# Patient Record
Sex: FEMALE | Race: WHITE | NOT HISPANIC OR LATINO | URBAN - METROPOLITAN AREA
[De-identification: names, ages, dates, MRNs, and addresses within clinical notes are randomized per-mention and may not be internally consistent; named-entity substitution may affect disease eponyms.]

---

## 2017-01-20 ENCOUNTER — INPATIENT (INPATIENT)
Facility: HOSPITAL | Age: 57
LOS: 10 days | Discharge: HOME | End: 2017-01-31
Attending: PSYCHIATRY & NEUROLOGY | Admitting: PSYCHIATRY & NEUROLOGY
Payer: COMMERCIAL

## 2017-01-20 VITALS
WEIGHT: 145.06 LBS | HEART RATE: 71 BPM | SYSTOLIC BLOOD PRESSURE: 148 MMHG | HEIGHT: 65 IN | RESPIRATION RATE: 18 BRPM | TEMPERATURE: 98 F | OXYGEN SATURATION: 96 % | DIASTOLIC BLOOD PRESSURE: 92 MMHG

## 2017-01-20 DIAGNOSIS — F60.4 HISTRIONIC PERSONALITY DISORDER: ICD-10-CM

## 2017-01-20 DIAGNOSIS — F31.30 BIPOLAR DISORDER, CURRENT EPISODE DEPRESSED, MILD OR MODERATE SEVERITY, UNSPECIFIED: ICD-10-CM

## 2017-01-20 DIAGNOSIS — F10.10 ALCOHOL ABUSE, UNCOMPLICATED: ICD-10-CM

## 2017-01-20 DIAGNOSIS — F13.10 SEDATIVE, HYPNOTIC OR ANXIOLYTIC ABUSE, UNCOMPLICATED: ICD-10-CM

## 2017-01-20 LAB
ALBUMIN SERPL ELPH-MCNC: 3.6 G/DL — SIGNIFICANT CHANGE UP (ref 3.3–5)
ALP SERPL-CCNC: 107 U/L — SIGNIFICANT CHANGE UP (ref 40–120)
ALT FLD-CCNC: 36 U/L — SIGNIFICANT CHANGE UP (ref 12–78)
AMPHET UR-MCNC: NEGATIVE — SIGNIFICANT CHANGE UP
ANION GAP SERPL CALC-SCNC: 8 MMOL/L — SIGNIFICANT CHANGE UP (ref 5–17)
APAP SERPL-MCNC: <2 UG/ML — LOW (ref 10–30)
APPEARANCE UR: CLEAR — SIGNIFICANT CHANGE UP
AST SERPL-CCNC: 31 U/L — SIGNIFICANT CHANGE UP (ref 15–37)
BARBITURATES UR SCN-MCNC: NEGATIVE — SIGNIFICANT CHANGE UP
BASOPHILS # BLD AUTO: 0.1 K/UL — SIGNIFICANT CHANGE UP (ref 0–0.2)
BASOPHILS NFR BLD AUTO: 0.9 % — SIGNIFICANT CHANGE UP (ref 0–2)
BENZODIAZ UR-MCNC: POSITIVE — SIGNIFICANT CHANGE UP
BILIRUB SERPL-MCNC: 0.1 MG/DL — LOW (ref 0.2–1.2)
BILIRUB UR-MCNC: NEGATIVE — SIGNIFICANT CHANGE UP
BUN SERPL-MCNC: 17 MG/DL — SIGNIFICANT CHANGE UP (ref 7–23)
CALCIUM SERPL-MCNC: 8.7 MG/DL — SIGNIFICANT CHANGE UP (ref 8.5–10.1)
CHLORIDE SERPL-SCNC: 109 MMOL/L — HIGH (ref 96–108)
CO2 SERPL-SCNC: 28 MMOL/L — SIGNIFICANT CHANGE UP (ref 22–31)
COCAINE METAB.OTHER UR-MCNC: NEGATIVE — SIGNIFICANT CHANGE UP
COLOR SPEC: YELLOW — SIGNIFICANT CHANGE UP
CREAT SERPL-MCNC: 0.82 MG/DL — SIGNIFICANT CHANGE UP (ref 0.5–1.3)
DIFF PNL FLD: NEGATIVE — SIGNIFICANT CHANGE UP
EOSINOPHIL # BLD AUTO: 0.3 K/UL — SIGNIFICANT CHANGE UP (ref 0–0.5)
EOSINOPHIL NFR BLD AUTO: 2.7 % — SIGNIFICANT CHANGE UP (ref 0–6)
ETHANOL SERPL-MCNC: 143 MG/DL — HIGH (ref 0–10)
GLUCOSE SERPL-MCNC: 88 MG/DL — SIGNIFICANT CHANGE UP (ref 70–99)
GLUCOSE UR QL: NEGATIVE MG/DL — SIGNIFICANT CHANGE UP
HCT VFR BLD CALC: 41.8 % — SIGNIFICANT CHANGE UP (ref 34.5–45)
HGB BLD-MCNC: 14.7 G/DL — SIGNIFICANT CHANGE UP (ref 11.5–15.5)
KETONES UR-MCNC: NEGATIVE — SIGNIFICANT CHANGE UP
LEUKOCYTE ESTERASE UR-ACNC: NEGATIVE — SIGNIFICANT CHANGE UP
LYMPHOCYTES # BLD AUTO: 4.9 K/UL — HIGH (ref 1–3.3)
LYMPHOCYTES # BLD AUTO: 50.6 % — HIGH (ref 13–44)
MCHC RBC-ENTMCNC: 34.7 PG — HIGH (ref 27–34)
MCHC RBC-ENTMCNC: 35.2 GM/DL — SIGNIFICANT CHANGE UP (ref 32–36)
MCV RBC AUTO: 98.6 FL — SIGNIFICANT CHANGE UP (ref 80–100)
METHADONE UR-MCNC: NEGATIVE — SIGNIFICANT CHANGE UP
MONOCYTES # BLD AUTO: 0.7 K/UL — SIGNIFICANT CHANGE UP (ref 0–0.9)
MONOCYTES NFR BLD AUTO: 7.5 % — SIGNIFICANT CHANGE UP (ref 2–14)
NEUTROPHILS # BLD AUTO: 3.7 K/UL — SIGNIFICANT CHANGE UP (ref 1.8–7.4)
NEUTROPHILS NFR BLD AUTO: 38.3 % — LOW (ref 43–77)
NITRITE UR-MCNC: NEGATIVE — SIGNIFICANT CHANGE UP
OPIATES UR-MCNC: NEGATIVE — SIGNIFICANT CHANGE UP
PCP SPEC-MCNC: SIGNIFICANT CHANGE UP
PCP UR-MCNC: NEGATIVE — SIGNIFICANT CHANGE UP
PH UR: 6 — SIGNIFICANT CHANGE UP (ref 4.8–8)
PLATELET # BLD AUTO: 121 K/UL — LOW (ref 150–400)
POTASSIUM SERPL-MCNC: 3.8 MMOL/L — SIGNIFICANT CHANGE UP (ref 3.5–5.3)
POTASSIUM SERPL-SCNC: 3.8 MMOL/L — SIGNIFICANT CHANGE UP (ref 3.5–5.3)
PROT SERPL-MCNC: 7.3 GM/DL — SIGNIFICANT CHANGE UP (ref 6–8.3)
PROT UR-MCNC: NEGATIVE MG/DL — SIGNIFICANT CHANGE UP
RBC # BLD: 4.24 M/UL — SIGNIFICANT CHANGE UP (ref 3.8–5.2)
RBC # FLD: 12.5 % — SIGNIFICANT CHANGE UP (ref 11–15)
SALICYLATES SERPL-MCNC: 3.2 MG/DL — SIGNIFICANT CHANGE UP (ref 2.8–20)
SODIUM SERPL-SCNC: 145 MMOL/L — SIGNIFICANT CHANGE UP (ref 135–145)
SP GR SPEC: 1 — LOW (ref 1.01–1.02)
THC UR QL: NEGATIVE — SIGNIFICANT CHANGE UP
UROBILINOGEN FLD QL: NEGATIVE MG/DL — SIGNIFICANT CHANGE UP
WBC # BLD: 9.6 K/UL — SIGNIFICANT CHANGE UP (ref 3.8–10.5)
WBC # FLD AUTO: 9.6 K/UL — SIGNIFICANT CHANGE UP (ref 3.8–10.5)

## 2017-01-20 PROCEDURE — 99285 EMERGENCY DEPT VISIT HI MDM: CPT

## 2017-01-20 PROCEDURE — 90792 PSYCH DIAG EVAL W/MED SRVCS: CPT

## 2017-01-20 RX ORDER — ACETAMINOPHEN 500 MG
650 TABLET ORAL EVERY 6 HOURS
Qty: 0 | Refills: 0 | Status: DISCONTINUED | OUTPATIENT
Start: 2017-01-20 | End: 2017-01-31

## 2017-01-20 RX ORDER — THIAMINE MONONITRATE (VIT B1) 100 MG
100 TABLET ORAL DAILY
Qty: 0 | Refills: 0 | Status: COMPLETED | OUTPATIENT
Start: 2017-01-20 | End: 2017-01-23

## 2017-01-20 RX ORDER — IBUPROFEN 200 MG
600 TABLET ORAL EVERY 6 HOURS
Qty: 0 | Refills: 0 | Status: DISCONTINUED | OUTPATIENT
Start: 2017-01-20 | End: 2017-01-31

## 2017-01-20 RX ORDER — FLUOXETINE HCL 10 MG
10 CAPSULE ORAL DAILY
Qty: 0 | Refills: 0 | Status: DISCONTINUED | OUTPATIENT
Start: 2017-01-20 | End: 2017-01-31

## 2017-01-20 RX ORDER — NICOTINE POLACRILEX 2 MG
2 GUM BUCCAL
Qty: 0 | Refills: 0 | Status: DISCONTINUED | OUTPATIENT
Start: 2017-01-20 | End: 2017-01-31

## 2017-01-20 RX ORDER — HYDROXYZINE HCL 10 MG
50 TABLET ORAL EVERY 6 HOURS
Qty: 0 | Refills: 0 | Status: DISCONTINUED | OUTPATIENT
Start: 2017-01-20 | End: 2017-01-31

## 2017-01-20 RX ORDER — DIPHENHYDRAMINE HCL 50 MG
50 CAPSULE ORAL EVERY 6 HOURS
Qty: 0 | Refills: 0 | Status: DISCONTINUED | OUTPATIENT
Start: 2017-01-20 | End: 2017-01-31

## 2017-01-20 RX ORDER — TRAZODONE HCL 50 MG
100 TABLET ORAL AT BEDTIME
Qty: 0 | Refills: 0 | Status: DISCONTINUED | OUTPATIENT
Start: 2017-01-20 | End: 2017-01-31

## 2017-01-20 RX ORDER — NICOTINE POLACRILEX 2 MG
2 GUM BUCCAL
Qty: 0 | Refills: 0 | Status: DISCONTINUED | OUTPATIENT
Start: 2017-01-20 | End: 2017-01-20

## 2017-01-20 RX ORDER — BENZOCAINE AND MENTHOL 5; 1 G/100ML; G/100ML
1 LIQUID ORAL EVERY 4 HOURS
Qty: 0 | Refills: 0 | Status: DISCONTINUED | OUTPATIENT
Start: 2017-01-20 | End: 2017-01-31

## 2017-01-20 RX ORDER — MAGNESIUM HYDROXIDE 400 MG/1
30 TABLET, CHEWABLE ORAL DAILY
Qty: 0 | Refills: 0 | Status: DISCONTINUED | OUTPATIENT
Start: 2017-01-20 | End: 2017-01-31

## 2017-01-20 NOTE — ED BEHAVIORAL HEALTH ASSESSMENT NOTE - SUMMARY
Patient is a 55 yo female with long history of alcohol abuse (history of withdrawals but no seizures), a strong Axis II pathology (Histrionic features), benzodiazepine misuse (Xanax, Valium), with reported history of Bipolar Disorder (rule out Substance Induced Mood Disorder), several inpatient hospitalizations, 1 prior suicide attempt via OD in 2012, no hx of violence, no hx of arrests, hx of belligerent, confrontational behavior with others who self-presents today with insomnia, odd preoccupations of a "world changing" invention she will get rich on, with active alcohol use, unstable living situation, looking physically much worst then last seen, who reports suicidality, inability to engage in safety planning and does not have a safe discharge at this time given high risk factors. Character pathology, active substance use, benzo seeking behavior is again an issue, Patient looks off baseline and thought content raises concern for acute issues which may be related to substances (regardless which not going to change disposition at this time). She signed in voluntarily.
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
## 2017-01-20 NOTE — ED PROVIDER NOTE - CARE PLAN
Principal Discharge DX:	Psychiatric disturbance Principal Discharge DX:	Moderate episode of recurrent major depressive disorder

## 2017-01-20 NOTE — ED BEHAVIORAL HEALTH ASSESSMENT NOTE - AXIS IV
Problem related to social environment/Occupational problems/Economic problems/Problems with primary support/Housing problems

## 2017-01-20 NOTE — ED BEHAVIORAL HEALTH ASSESSMENT NOTE - MEDICAL ISSUES AND PLAN (INCLUDE STANDING AND PRN MEDICATION)
con;t clonidine 0.1mg PO bid for HTN; analgesics PRN for chronic back pain; nicotine gum PRN con;t clonidine 0.1mg PO bid for HTN; pain mediation PRN for chronic back pain; nicotine gum PRN con;t clonidine 0.1mg PO bid for HTN; pain mediation PRN for chronic back pain; nicotine gum PRN; lozenges & Guaifenesin for cough

## 2017-01-20 NOTE — ED PROVIDER NOTE - MEDICAL DECISION MAKING DETAILS
patient pw psychiatric disturbance. medically clear for discharge home or psych admission. patient pw psychiatric disturbance. medically clear for discharge home or psych admission. admit for depression

## 2017-01-20 NOTE — ED BEHAVIORAL HEALTH ASSESSMENT NOTE - SUICIDE RISK FACTORS
Agitation/severe anxiety/Chronic pain or acute medical issue/History of abuse/trauma/Hopelessness/Mood episode/Unable to engage in safety planning/Substance abuse/dependence/Anhedonia
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
## 2017-01-20 NOTE — ED BEHAVIORAL HEALTH ASSESSMENT NOTE - OTHER PAST PSYCHIATRIC HISTORY (INCLUDE DETAILS REGARDING ONSET, COURSE OF ILLNESS, INPATIENT/OUTPATIENT TREATMENT)
Pt reports she is on trazodone 100mg qhs as needed for sleep and clonidine for her blood pressure. Pt reports hx of bipolar d/o w/ hx of karo but mostly depressive episodes. Pt reports she has tried everything as far as medications and does not remember what has worked. 7 prior hospitalizations. She reports her manic episode in the past as 2 days of euphoria, increased goal directed activity, and racing thoughts but was also using substances during this time.

## 2017-01-20 NOTE — ED BEHAVIORAL HEALTH ASSESSMENT NOTE - TREATMENT
pt reports in may attending rehab at [facility] to get off of cymbalta and klonopin

## 2017-01-20 NOTE — ED BEHAVIORAL HEALTH ASSESSMENT NOTE - PSYCHIATRIC ISSUES AND PLAN (INCLUDE STANDING AND PRN MEDICATION)
continue Trazodone 100mg PO qhs; Fluoxetine 10mg PO qd; + PRNs; given long hx of benzo abuse, will order Ativan standing (withdrawal seizures) continue Trazodone 100mg PO qhs; Fluoxetine 10mg PO qd; + PRNs; given long hx of benzo abuse, will order Ativan 1mg PO bid standing (withdrawal seizures)

## 2017-01-20 NOTE — ED BEHAVIORAL HEALTH ASSESSMENT NOTE - RISK ASSESSMENT
Chronic risk factors: single, ongoing substance use; estranged from family and most friend; Axis II pathology; hx of belligerence; chronic unstable domicile; hx of suicide attempt. Protective factors: age < 65 yrs; medication and treatment compliant in general; access to health services. Acute risk factors: insomnia, odd preoccupations of a "world changing" invention she will get rich on, with active alcohol use, unstable living situation, looking physically much worst then last seen, who reports suicidality, inability to engage in safety planning
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
## 2017-01-20 NOTE — ED BEHAVIORAL HEALTH ASSESSMENT NOTE - DESCRIPTION (FIRST USE, LAST USE, QUANTITY, FREQUENCY, DURATION)
reports smoking 1 dozen "organic cigarettes" long hx of alcohol abuse, drank earlier today -  today occasionally smokes marijuana remote use. Pt states she has not used cocaine in 30yrs. years of benzo misuse; last used today (UTOX "+" for benzos)

## 2017-01-20 NOTE — ED PROVIDER NOTE - PHYSICAL EXAMINATION
Gen: Alert, NAD  Head: NC, AT   Eyes: PERRL, EOMI, normal lids/conjunctiva  ENT: normal hearing, patent oropharynx without erythema/exudate, uvula midline  Neck: supple, no tenderness, Trachea midline  Pulm: Bilateral BS, normal resp effort, no wheeze/stridor/retractions  CV: RRR, no M/R/G, 2+ radial and dp pulses bl, no edema  Abd: soft, NT/ND, +BS, no hepatosplenomegaly  Mskel: extremities x4 with normal ROM and no joint effusions. no ctl spine ttp.   Skin: no rash, no bruising   Neuro: AAOx3, no sensory/motor deficits, CN 2-12 intact  Psych: tearful, tangential. no si, hi. does not appear to be hallucinating.

## 2017-01-21 PROCEDURE — 99232 SBSQ HOSP IP/OBS MODERATE 35: CPT

## 2017-01-21 PROCEDURE — 71010: CPT | Mod: 26

## 2017-01-21 RX ADMIN — MAGNESIUM HYDROXIDE 30 MILLILITER(S): 400 TABLET, CHEWABLE ORAL at 20:18

## 2017-01-21 RX ADMIN — Medication 10 MILLIGRAM(S): at 09:22

## 2017-01-21 RX ADMIN — Medication 1 MILLIGRAM(S): at 20:15

## 2017-01-21 RX ADMIN — Medication 1 MILLIGRAM(S): at 09:23

## 2017-01-21 RX ADMIN — Medication 100 MILLIGRAM(S): at 14:42

## 2017-01-21 RX ADMIN — Medication 2 MILLIGRAM(S): at 06:24

## 2017-01-21 RX ADMIN — Medication 100 MILLIGRAM(S): at 09:23

## 2017-01-21 RX ADMIN — Medication 0.1 MILLIGRAM(S): at 20:15

## 2017-01-21 RX ADMIN — Medication 0.1 MILLIGRAM(S): at 09:23

## 2017-01-21 RX ADMIN — Medication 2 MILLIGRAM(S): at 15:14

## 2017-01-21 RX ADMIN — Medication 2 MILLIGRAM(S): at 20:18

## 2017-01-21 RX ADMIN — Medication 2 MILLIGRAM(S): at 18:09

## 2017-01-21 RX ADMIN — Medication 1 TABLET(S): at 09:22

## 2017-01-21 RX ADMIN — Medication 100 MILLIGRAM(S): at 20:15

## 2017-01-21 RX ADMIN — Medication 2 MILLIGRAM(S): at 14:42
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
## 2017-01-22 RX ADMIN — Medication 100 MILLIGRAM(S): at 08:31

## 2017-01-22 RX ADMIN — Medication 1 TABLET(S): at 09:35

## 2017-01-22 RX ADMIN — Medication 600 MILLIGRAM(S): at 09:35

## 2017-01-22 RX ADMIN — Medication 100 MILLIGRAM(S): at 09:35

## 2017-01-22 RX ADMIN — Medication 2 MILLIGRAM(S): at 09:35

## 2017-01-22 RX ADMIN — Medication 100 MILLIGRAM(S): at 14:25

## 2017-01-22 RX ADMIN — Medication 100 MILLIGRAM(S): at 20:16

## 2017-01-22 RX ADMIN — Medication 100 MILLIGRAM(S): at 20:15

## 2017-01-22 RX ADMIN — Medication 2 MILLIGRAM(S): at 13:08

## 2017-01-22 RX ADMIN — Medication 50 MILLIGRAM(S): at 14:19

## 2017-01-22 RX ADMIN — MAGNESIUM HYDROXIDE 30 MILLILITER(S): 400 TABLET, CHEWABLE ORAL at 20:18

## 2017-01-22 RX ADMIN — Medication 1 MILLIGRAM(S): at 09:35

## 2017-01-22 RX ADMIN — Medication 0.1 MILLIGRAM(S): at 09:35

## 2017-01-22 RX ADMIN — Medication 1 MILLIGRAM(S): at 20:15

## 2017-01-22 RX ADMIN — Medication 0.1 MILLIGRAM(S): at 20:15

## 2017-01-22 RX ADMIN — Medication 600 MILLIGRAM(S): at 10:07

## 2017-01-22 RX ADMIN — Medication 10 MILLIGRAM(S): at 09:35

## 2017-01-23 PROCEDURE — 99233 SBSQ HOSP IP/OBS HIGH 50: CPT

## 2017-01-23 RX ADMIN — Medication 2 MILLIGRAM(S): at 09:12

## 2017-01-23 RX ADMIN — Medication 0.1 MILLIGRAM(S): at 09:08

## 2017-01-23 RX ADMIN — Medication 0.1 MILLIGRAM(S): at 20:39

## 2017-01-23 RX ADMIN — Medication 2 MILLIGRAM(S): at 14:22

## 2017-01-23 RX ADMIN — Medication 1 MILLIGRAM(S): at 20:39

## 2017-01-23 RX ADMIN — Medication 50 MILLIGRAM(S): at 20:38

## 2017-01-23 RX ADMIN — Medication 50 MILLIGRAM(S): at 14:23

## 2017-01-23 RX ADMIN — Medication 1 MILLIGRAM(S): at 09:08

## 2017-01-23 RX ADMIN — Medication 10 MILLIGRAM(S): at 09:08

## 2017-01-23 RX ADMIN — Medication 100 MILLIGRAM(S): at 09:12

## 2017-01-23 RX ADMIN — Medication 650 MILLIGRAM(S): at 15:51

## 2017-01-23 RX ADMIN — Medication 100 MILLIGRAM(S): at 15:19

## 2017-01-23 RX ADMIN — Medication 100 MILLIGRAM(S): at 09:08

## 2017-01-23 RX ADMIN — Medication 1 TABLET(S): at 09:08

## 2017-01-23 RX ADMIN — Medication 100 MILLIGRAM(S): at 20:39

## 2017-01-24 PROCEDURE — 99232 SBSQ HOSP IP/OBS MODERATE 35: CPT

## 2017-01-24 RX ADMIN — Medication 50 MILLIGRAM(S): at 21:24

## 2017-01-24 RX ADMIN — Medication 1 MILLIGRAM(S): at 08:57

## 2017-01-24 RX ADMIN — MAGNESIUM HYDROXIDE 30 MILLILITER(S): 400 TABLET, CHEWABLE ORAL at 20:39

## 2017-01-24 RX ADMIN — Medication 2 MILLIGRAM(S): at 15:53

## 2017-01-24 RX ADMIN — Medication 2 MILLIGRAM(S): at 20:39

## 2017-01-24 RX ADMIN — Medication 0.1 MILLIGRAM(S): at 20:39

## 2017-01-24 RX ADMIN — Medication 1 TABLET(S): at 08:58

## 2017-01-24 RX ADMIN — Medication 100 MILLIGRAM(S): at 20:39

## 2017-01-24 RX ADMIN — Medication 50 MILLIGRAM(S): at 13:08

## 2017-01-24 RX ADMIN — Medication 10 MILLIGRAM(S): at 08:57

## 2017-01-24 RX ADMIN — Medication 100 MILLIGRAM(S): at 21:22

## 2017-01-24 RX ADMIN — Medication 2 MILLIGRAM(S): at 09:28

## 2017-01-24 RX ADMIN — Medication 1 MILLIGRAM(S): at 20:39

## 2017-01-24 RX ADMIN — Medication 0.1 MILLIGRAM(S): at 08:57

## 2017-01-24 RX ADMIN — Medication 2 MILLIGRAM(S): at 13:08

## 2017-01-25 PROCEDURE — 99232 SBSQ HOSP IP/OBS MODERATE 35: CPT

## 2017-01-25 RX ADMIN — Medication 600 MILLIGRAM(S): at 12:44

## 2017-01-25 RX ADMIN — Medication 0.1 MILLIGRAM(S): at 09:08

## 2017-01-25 RX ADMIN — Medication 100 MILLIGRAM(S): at 20:20

## 2017-01-25 RX ADMIN — Medication 10 MILLIGRAM(S): at 09:08

## 2017-01-25 RX ADMIN — Medication 1 TABLET(S): at 09:08

## 2017-01-25 RX ADMIN — Medication 2 MILLIGRAM(S): at 11:45

## 2017-01-25 RX ADMIN — Medication 100 MILLIGRAM(S): at 20:18

## 2017-01-25 RX ADMIN — Medication 100 MILLIGRAM(S): at 09:09

## 2017-01-25 RX ADMIN — Medication 600 MILLIGRAM(S): at 11:45

## 2017-01-25 RX ADMIN — Medication 50 MILLIGRAM(S): at 11:44

## 2017-01-25 RX ADMIN — Medication 2 MILLIGRAM(S): at 18:25

## 2017-01-25 RX ADMIN — Medication 0.1 MILLIGRAM(S): at 20:19

## 2017-01-25 RX ADMIN — Medication 2 MILLIGRAM(S): at 09:10

## 2017-01-25 RX ADMIN — Medication 50 MILLIGRAM(S): at 20:18

## 2017-01-25 RX ADMIN — Medication 1 MILLIGRAM(S): at 20:19

## 2017-01-25 RX ADMIN — Medication 2 MILLIGRAM(S): at 15:51

## 2017-01-25 RX ADMIN — Medication 1 MILLIGRAM(S): at 09:08

## 2017-01-26 PROCEDURE — 99232 SBSQ HOSP IP/OBS MODERATE 35: CPT

## 2017-01-26 RX ADMIN — Medication 2 MILLIGRAM(S): at 13:09

## 2017-01-26 RX ADMIN — Medication 50 MILLIGRAM(S): at 19:20

## 2017-01-26 RX ADMIN — Medication 50 MILLIGRAM(S): at 13:09

## 2017-01-26 RX ADMIN — Medication 1 TABLET(S): at 08:58

## 2017-01-26 RX ADMIN — Medication 600 MILLIGRAM(S): at 18:25

## 2017-01-26 RX ADMIN — Medication 0.1 MILLIGRAM(S): at 20:23

## 2017-01-26 RX ADMIN — Medication 2 MILLIGRAM(S): at 08:58

## 2017-01-26 RX ADMIN — Medication 1 MILLIGRAM(S): at 20:23

## 2017-01-26 RX ADMIN — Medication 0.1 MILLIGRAM(S): at 08:58

## 2017-01-26 RX ADMIN — Medication 1 MILLIGRAM(S): at 08:58

## 2017-01-26 RX ADMIN — Medication 100 MILLIGRAM(S): at 20:23

## 2017-01-26 RX ADMIN — Medication 10 MILLIGRAM(S): at 08:58

## 2017-01-27 PROCEDURE — 99232 SBSQ HOSP IP/OBS MODERATE 35: CPT

## 2017-01-27 RX ADMIN — Medication 2 MILLIGRAM(S): at 20:26

## 2017-01-27 RX ADMIN — Medication 0.1 MILLIGRAM(S): at 20:26

## 2017-01-27 RX ADMIN — Medication 50 MILLIGRAM(S): at 08:14

## 2017-01-27 RX ADMIN — Medication 10 MILLIGRAM(S): at 08:31

## 2017-01-27 RX ADMIN — Medication 50 MILLIGRAM(S): at 14:06

## 2017-01-27 RX ADMIN — Medication 2 MILLIGRAM(S): at 08:14

## 2017-01-27 RX ADMIN — Medication 0.1 MILLIGRAM(S): at 08:31

## 2017-01-27 RX ADMIN — Medication 50 MILLIGRAM(S): at 20:25

## 2017-01-27 RX ADMIN — Medication 1 TABLET(S): at 08:31

## 2017-01-27 RX ADMIN — Medication 100 MILLIGRAM(S): at 20:26

## 2017-01-27 RX ADMIN — Medication 1 MILLIGRAM(S): at 08:31

## 2017-01-27 RX ADMIN — Medication 2 MILLIGRAM(S): at 14:06

## 2017-01-27 RX ADMIN — Medication 1 MILLIGRAM(S): at 20:26

## 2017-01-28 RX ADMIN — Medication 2 MILLIGRAM(S): at 15:05

## 2017-01-28 RX ADMIN — Medication 50 MILLIGRAM(S): at 05:06

## 2017-01-28 RX ADMIN — Medication 0.1 MILLIGRAM(S): at 20:02

## 2017-01-28 RX ADMIN — Medication 600 MILLIGRAM(S): at 10:39

## 2017-01-28 RX ADMIN — Medication 1 TABLET(S): at 09:29

## 2017-01-28 RX ADMIN — Medication 50 MILLIGRAM(S): at 19:01

## 2017-01-28 RX ADMIN — Medication 10 MILLIGRAM(S): at 09:29

## 2017-01-28 RX ADMIN — Medication 2 MILLIGRAM(S): at 19:01

## 2017-01-28 RX ADMIN — Medication 2 MILLIGRAM(S): at 13:00

## 2017-01-28 RX ADMIN — Medication 100 MILLIGRAM(S): at 20:02

## 2017-01-28 RX ADMIN — Medication 600 MILLIGRAM(S): at 09:29

## 2017-01-28 RX ADMIN — Medication 50 MILLIGRAM(S): at 13:00

## 2017-01-28 RX ADMIN — Medication 2 MILLIGRAM(S): at 09:29

## 2017-01-28 RX ADMIN — Medication 0.1 MILLIGRAM(S): at 09:29

## 2017-01-29 RX ADMIN — Medication 2 MILLIGRAM(S): at 14:45

## 2017-01-29 RX ADMIN — Medication 600 MILLIGRAM(S): at 09:40

## 2017-01-29 RX ADMIN — Medication 0.1 MILLIGRAM(S): at 20:14

## 2017-01-29 RX ADMIN — Medication 600 MILLIGRAM(S): at 15:40

## 2017-01-29 RX ADMIN — Medication 600 MILLIGRAM(S): at 16:35

## 2017-01-29 RX ADMIN — Medication 600 MILLIGRAM(S): at 21:40

## 2017-01-29 RX ADMIN — Medication 2 MILLIGRAM(S): at 17:54

## 2017-01-29 RX ADMIN — Medication 2 MILLIGRAM(S): at 12:45

## 2017-01-29 RX ADMIN — Medication 2 MILLIGRAM(S): at 09:40

## 2017-01-29 RX ADMIN — Medication 100 MILLIGRAM(S): at 20:14

## 2017-01-29 RX ADMIN — Medication 1 TABLET(S): at 09:42

## 2017-01-29 RX ADMIN — Medication 600 MILLIGRAM(S): at 22:12

## 2017-01-29 RX ADMIN — Medication 10 MILLIGRAM(S): at 09:42

## 2017-01-29 RX ADMIN — Medication 50 MILLIGRAM(S): at 12:45

## 2017-01-29 RX ADMIN — Medication 2 MILLIGRAM(S): at 05:42

## 2017-01-29 RX ADMIN — Medication 600 MILLIGRAM(S): at 10:40

## 2017-01-29 RX ADMIN — Medication 0.1 MILLIGRAM(S): at 09:42

## 2017-01-29 RX ADMIN — Medication 2 MILLIGRAM(S): at 21:04

## 2017-01-29 RX ADMIN — Medication 50 MILLIGRAM(S): at 05:43

## 2017-01-29 RX ADMIN — Medication 50 MILLIGRAM(S): at 18:50

## 2017-01-30 PROCEDURE — 99232 SBSQ HOSP IP/OBS MODERATE 35: CPT

## 2017-01-30 RX ADMIN — Medication 2 MILLIGRAM(S): at 17:45

## 2017-01-30 RX ADMIN — Medication 600 MILLIGRAM(S): at 15:10

## 2017-01-30 RX ADMIN — Medication 50 MILLIGRAM(S): at 16:14

## 2017-01-30 RX ADMIN — Medication 2 MILLIGRAM(S): at 08:28

## 2017-01-30 RX ADMIN — Medication 2 MILLIGRAM(S): at 15:10

## 2017-01-30 RX ADMIN — Medication 100 MILLIGRAM(S): at 20:15

## 2017-01-30 RX ADMIN — Medication 0.1 MILLIGRAM(S): at 20:15

## 2017-01-30 RX ADMIN — Medication 50 MILLIGRAM(S): at 22:25

## 2017-01-30 RX ADMIN — Medication 1 TABLET(S): at 08:28

## 2017-01-30 RX ADMIN — Medication 600 MILLIGRAM(S): at 14:32

## 2017-01-30 RX ADMIN — Medication 0.1 MILLIGRAM(S): at 08:28

## 2017-01-30 RX ADMIN — Medication 2 MILLIGRAM(S): at 10:53

## 2017-01-30 RX ADMIN — Medication 2 MILLIGRAM(S): at 20:15

## 2017-01-30 RX ADMIN — Medication 10 MILLIGRAM(S): at 08:28

## 2017-01-30 RX ADMIN — Medication 50 MILLIGRAM(S): at 10:04

## 2017-01-30 RX ADMIN — Medication 2 MILLIGRAM(S): at 13:08

## 2017-01-31 VITALS
RESPIRATION RATE: 16 BRPM | OXYGEN SATURATION: 100 % | TEMPERATURE: 97 F | DIASTOLIC BLOOD PRESSURE: 84 MMHG | HEART RATE: 59 BPM | SYSTOLIC BLOOD PRESSURE: 143 MMHG

## 2017-01-31 PROCEDURE — 99239 HOSP IP/OBS DSCHRG MGMT >30: CPT

## 2017-01-31 RX ORDER — FLUOXETINE HCL 10 MG
1 CAPSULE ORAL
Qty: 30 | Refills: 0 | OUTPATIENT
Start: 2017-01-31 | End: 2017-03-02

## 2017-01-31 RX ORDER — TRAZODONE HCL 50 MG
1 TABLET ORAL
Qty: 30 | Refills: 0 | OUTPATIENT
Start: 2017-01-31 | End: 2017-03-02

## 2017-01-31 RX ADMIN — Medication 1 TABLET(S): at 08:21

## 2017-01-31 RX ADMIN — Medication 0.1 MILLIGRAM(S): at 08:21

## 2017-01-31 RX ADMIN — Medication 10 MILLIGRAM(S): at 08:21

## 2017-01-31 RX ADMIN — Medication 50 MILLIGRAM(S): at 05:01

## 2017-01-31 RX ADMIN — Medication 2 MILLIGRAM(S): at 08:21

## 2017-02-05 DIAGNOSIS — Z86.59 PERSONAL HISTORY OF OTHER MENTAL AND BEHAVIORAL DISORDERS: ICD-10-CM

## 2017-02-05 DIAGNOSIS — G47.00 INSOMNIA, UNSPECIFIED: ICD-10-CM

## 2017-02-05 DIAGNOSIS — Z63.8 OTHER SPECIFIED PROBLEMS RELATED TO PRIMARY SUPPORT GROUP: ICD-10-CM

## 2017-02-05 DIAGNOSIS — I51.9 HEART DISEASE, UNSPECIFIED: ICD-10-CM

## 2017-02-05 DIAGNOSIS — F31.30 BIPOLAR DISORDER, CURRENT EPISODE DEPRESSED, MILD OR MODERATE SEVERITY, UNSPECIFIED: ICD-10-CM

## 2017-02-05 DIAGNOSIS — Z91.010 ALLERGY TO PEANUTS: ICD-10-CM

## 2017-02-05 DIAGNOSIS — F60.4 HISTRIONIC PERSONALITY DISORDER: ICD-10-CM

## 2017-02-05 DIAGNOSIS — I10 ESSENTIAL (PRIMARY) HYPERTENSION: ICD-10-CM

## 2017-02-05 DIAGNOSIS — M54.9 DORSALGIA, UNSPECIFIED: ICD-10-CM

## 2017-02-05 DIAGNOSIS — Z88.5 ALLERGY STATUS TO NARCOTIC AGENT: ICD-10-CM

## 2017-02-05 DIAGNOSIS — Z91.5 PERSONAL HISTORY OF SELF-HARM: ICD-10-CM

## 2017-02-05 DIAGNOSIS — Z56.0 UNEMPLOYMENT, UNSPECIFIED: ICD-10-CM

## 2017-02-05 DIAGNOSIS — G89.29 OTHER CHRONIC PAIN: ICD-10-CM

## 2017-02-05 DIAGNOSIS — Z88.8 ALLERGY STATUS TO OTHER DRUGS, MEDICAMENTS AND BIOLOGICAL SUBSTANCES STATUS: ICD-10-CM

## 2017-02-05 DIAGNOSIS — Z91.14 PATIENT'S OTHER NONCOMPLIANCE WITH MEDICATION REGIMEN: ICD-10-CM

## 2017-02-05 SDOH — ECONOMIC STABILITY - INCOME SECURITY: UNEMPLOYMENT, UNSPECIFIED: Z56.0

## 2017-02-05 SDOH — SOCIAL STABILITY - SOCIAL INSECURITY: OTHER SPECIFIED PROBLEMS RELATED TO PRIMARY SUPPORT GROUP: Z63.8

## 2017-04-01 ENCOUNTER — OUTPATIENT (OUTPATIENT)
Dept: OUTPATIENT SERVICES | Facility: HOSPITAL | Age: 57
LOS: 1 days | End: 2017-04-01

## 2017-04-12 DIAGNOSIS — R69 ILLNESS, UNSPECIFIED: ICD-10-CM

## 2017-07-01 ENCOUNTER — OUTPATIENT (OUTPATIENT)
Dept: OUTPATIENT SERVICES | Facility: HOSPITAL | Age: 57
LOS: 1 days | End: 2017-07-01
Payer: MEDICAID

## 2017-07-19 DIAGNOSIS — R69 ILLNESS, UNSPECIFIED: ICD-10-CM

## 2017-08-01 ENCOUNTER — OUTPATIENT (OUTPATIENT)
Dept: OUTPATIENT SERVICES | Facility: HOSPITAL | Age: 57
LOS: 1 days | End: 2017-08-01

## 2018-02-07 DIAGNOSIS — R69 ILLNESS, UNSPECIFIED: ICD-10-CM

## 2018-05-01 PROCEDURE — G9001: CPT

## 2018-05-01 PROCEDURE — G9005: CPT

## 2018-06-15 ENCOUNTER — EMERGENCY (EMERGENCY)
Facility: HOSPITAL | Age: 58
LOS: 0 days | Discharge: HOME | End: 2018-06-15
Attending: EMERGENCY MEDICINE
Payer: MEDICAID

## 2018-06-15 VITALS
DIASTOLIC BLOOD PRESSURE: 95 MMHG | HEIGHT: 65 IN | HEART RATE: 86 BPM | TEMPERATURE: 98 F | SYSTOLIC BLOOD PRESSURE: 149 MMHG | RESPIRATION RATE: 16 BRPM | WEIGHT: 169.98 LBS | OXYGEN SATURATION: 98 %

## 2018-06-15 VITALS
DIASTOLIC BLOOD PRESSURE: 90 MMHG | SYSTOLIC BLOOD PRESSURE: 161 MMHG | HEART RATE: 58 BPM | OXYGEN SATURATION: 97 % | RESPIRATION RATE: 17 BRPM | TEMPERATURE: 98 F

## 2018-06-15 DIAGNOSIS — R42 DIZZINESS AND GIDDINESS: ICD-10-CM

## 2018-06-15 DIAGNOSIS — F31.9 BIPOLAR DISORDER, UNSPECIFIED: ICD-10-CM

## 2018-06-15 DIAGNOSIS — Z91.010 ALLERGY TO PEANUTS: ICD-10-CM

## 2018-06-15 DIAGNOSIS — I15.9 SECONDARY HYPERTENSION, UNSPECIFIED: ICD-10-CM

## 2018-06-15 DIAGNOSIS — R51 HEADACHE: ICD-10-CM

## 2018-06-15 PROCEDURE — 70450 CT HEAD/BRAIN W/O DYE: CPT | Mod: 26

## 2018-06-15 PROCEDURE — 99284 EMERGENCY DEPT VISIT MOD MDM: CPT

## 2018-06-15 RX ORDER — ACETAMINOPHEN 500 MG
650 TABLET ORAL ONCE
Qty: 0 | Refills: 0 | Status: COMPLETED | OUTPATIENT
Start: 2018-06-15 | End: 2018-06-15

## 2018-06-15 RX ADMIN — Medication 650 MILLIGRAM(S): at 14:45

## 2018-06-15 NOTE — ED PROVIDER NOTE - PHYSICAL EXAMINATION
Gen: No acute distress, non toxic  HEENT: Mucous membranes moist, pink conjunctivae, EOMI. NCAT  Neck: supple, no ttp.   CV: RRR, nl s1/s2.  Resp: CTAB, normal rate and effort  GI: Abdomen soft, NT, ND. No rebound, no guarding  : No CVAT  Neuro: A&O x 3, moving all 4 extremities  MSK: No spine or joint tenderness to palpation  Skin: No rashes. intact and perfused.
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
## 2018-06-15 NOTE — ED PROVIDER NOTE - MEDICAL DECISION MAKING DETAILS
reported knot to forehead that has since resolved per pt, ct r/o trauma/bleed. pt otherwise well and normal neuro. pt states just wants ct.

## 2018-06-16 RX ORDER — FLUOXETINE HCL 10 MG
1 CAPSULE ORAL
Qty: 0 | Refills: 0 | COMMUNITY

## 2018-06-16 RX ORDER — TRAZODONE HCL 50 MG
1 TABLET ORAL
Qty: 0 | Refills: 0 | COMMUNITY

## 2018-07-01 ENCOUNTER — OUTPATIENT (OUTPATIENT)
Dept: OUTPATIENT SERVICES | Facility: HOSPITAL | Age: 58
LOS: 1 days | End: 2018-07-01
Payer: COMMERCIAL

## 2018-07-10 DIAGNOSIS — Z71.89 OTHER SPECIFIED COUNSELING: ICD-10-CM

## 2019-01-01 ENCOUNTER — OUTPATIENT (OUTPATIENT)
Dept: OUTPATIENT SERVICES | Facility: HOSPITAL | Age: 59
LOS: 1 days | End: 2019-01-01
Payer: COMMERCIAL

## 2019-03-14 DIAGNOSIS — Z71.89 OTHER SPECIFIED COUNSELING: ICD-10-CM

## 2019-07-03 ENCOUNTER — EMERGENCY (EMERGENCY)
Facility: HOSPITAL | Age: 59
LOS: 0 days | Discharge: HOME | End: 2019-07-03
Attending: EMERGENCY MEDICINE

## 2019-07-03 VITALS
WEIGHT: 145.06 LBS | DIASTOLIC BLOOD PRESSURE: 74 MMHG | HEART RATE: 65 BPM | OXYGEN SATURATION: 100 % | SYSTOLIC BLOOD PRESSURE: 136 MMHG | HEIGHT: 65 IN | TEMPERATURE: 99 F | RESPIRATION RATE: 18 BRPM

## 2019-07-03 VITALS
DIASTOLIC BLOOD PRESSURE: 57 MMHG | SYSTOLIC BLOOD PRESSURE: 108 MMHG | RESPIRATION RATE: 18 BRPM | HEART RATE: 59 BPM | OXYGEN SATURATION: 99 % | TEMPERATURE: 98 F

## 2019-07-03 DIAGNOSIS — K27.5 CHRONIC OR UNSPECIFIED PEPTIC ULCER, SITE UNSPECIFIED, WITH PERFORATION: Chronic | ICD-10-CM

## 2019-07-03 DIAGNOSIS — F43.10 POST-TRAUMATIC STRESS DISORDER, UNSPECIFIED: ICD-10-CM

## 2019-07-03 DIAGNOSIS — Z98.62 PERIPHERAL VASCULAR ANGIOPLASTY STATUS: Chronic | ICD-10-CM

## 2019-07-03 DIAGNOSIS — F31.9 BIPOLAR DISORDER, UNSPECIFIED: ICD-10-CM

## 2019-07-03 DIAGNOSIS — I99.9 UNSPECIFIED DISORDER OF CIRCULATORY SYSTEM: ICD-10-CM

## 2019-07-03 DIAGNOSIS — Z90.49 ACQUIRED ABSENCE OF OTHER SPECIFIED PARTS OF DIGESTIVE TRACT: Chronic | ICD-10-CM

## 2019-07-03 DIAGNOSIS — Z87.42 PERSONAL HISTORY OF OTHER DISEASES OF THE FEMALE GENITAL TRACT: Chronic | ICD-10-CM

## 2019-07-03 DIAGNOSIS — I15.9 SECONDARY HYPERTENSION, UNSPECIFIED: ICD-10-CM

## 2019-07-03 DIAGNOSIS — Z98.890 OTHER SPECIFIED POSTPROCEDURAL STATES: Chronic | ICD-10-CM

## 2019-07-03 DIAGNOSIS — M79.669 PAIN IN UNSPECIFIED LOWER LEG: ICD-10-CM

## 2019-07-03 DIAGNOSIS — F41.9 ANXIETY DISORDER, UNSPECIFIED: ICD-10-CM

## 2019-07-03 LAB
ALBUMIN SERPL ELPH-MCNC: 3.8 G/DL — SIGNIFICANT CHANGE UP (ref 3.3–5)
ALP SERPL-CCNC: 84 U/L — SIGNIFICANT CHANGE UP (ref 40–120)
ALT FLD-CCNC: 24 U/L — SIGNIFICANT CHANGE UP (ref 12–78)
ANION GAP SERPL CALC-SCNC: 9 MMOL/L — SIGNIFICANT CHANGE UP (ref 5–17)
APTT BLD: 31.4 SEC — SIGNIFICANT CHANGE UP (ref 27.5–36.3)
AST SERPL-CCNC: 21 U/L — SIGNIFICANT CHANGE UP (ref 15–37)
BASOPHILS # BLD AUTO: 0.04 K/UL — SIGNIFICANT CHANGE UP (ref 0–0.2)
BASOPHILS NFR BLD AUTO: 0.5 % — SIGNIFICANT CHANGE UP (ref 0–2)
BILIRUB SERPL-MCNC: 0.7 MG/DL — SIGNIFICANT CHANGE UP (ref 0.2–1.2)
BLD GP AB SCN SERPL QL: SIGNIFICANT CHANGE UP
BUN SERPL-MCNC: 10 MG/DL — SIGNIFICANT CHANGE UP (ref 7–23)
CALCIUM SERPL-MCNC: 9 MG/DL — SIGNIFICANT CHANGE UP (ref 8.5–10.1)
CHLORIDE SERPL-SCNC: 107 MMOL/L — SIGNIFICANT CHANGE UP (ref 96–108)
CO2 SERPL-SCNC: 24 MMOL/L — SIGNIFICANT CHANGE UP (ref 22–31)
CREAT SERPL-MCNC: 0.88 MG/DL — SIGNIFICANT CHANGE UP (ref 0.5–1.3)
EOSINOPHIL # BLD AUTO: 0.08 K/UL — SIGNIFICANT CHANGE UP (ref 0–0.5)
EOSINOPHIL NFR BLD AUTO: 1 % — SIGNIFICANT CHANGE UP (ref 0–6)
GLUCOSE SERPL-MCNC: 99 MG/DL — SIGNIFICANT CHANGE UP (ref 70–99)
HCT VFR BLD CALC: 41.6 % — SIGNIFICANT CHANGE UP (ref 34.5–45)
HGB BLD-MCNC: 13.8 G/DL — SIGNIFICANT CHANGE UP (ref 11.5–15.5)
IMM GRANULOCYTES NFR BLD AUTO: 0.3 % — SIGNIFICANT CHANGE UP (ref 0–1.5)
INR BLD: 1 RATIO — SIGNIFICANT CHANGE UP (ref 0.88–1.16)
LYMPHOCYTES # BLD AUTO: 2.6 K/UL — SIGNIFICANT CHANGE UP (ref 1–3.3)
LYMPHOCYTES # BLD AUTO: 32.8 % — SIGNIFICANT CHANGE UP (ref 13–44)
MAGNESIUM SERPL-MCNC: 2.1 MG/DL — SIGNIFICANT CHANGE UP (ref 1.6–2.6)
MCHC RBC-ENTMCNC: 32.6 PG — SIGNIFICANT CHANGE UP (ref 27–34)
MCHC RBC-ENTMCNC: 33.2 GM/DL — SIGNIFICANT CHANGE UP (ref 32–36)
MCV RBC AUTO: 98.3 FL — SIGNIFICANT CHANGE UP (ref 80–100)
MONOCYTES # BLD AUTO: 0.62 K/UL — SIGNIFICANT CHANGE UP (ref 0–0.9)
MONOCYTES NFR BLD AUTO: 7.8 % — SIGNIFICANT CHANGE UP (ref 2–14)
NEUTROPHILS # BLD AUTO: 4.57 K/UL — SIGNIFICANT CHANGE UP (ref 1.8–7.4)
NEUTROPHILS NFR BLD AUTO: 57.6 % — SIGNIFICANT CHANGE UP (ref 43–77)
NRBC # BLD: 0 /100 WBCS — SIGNIFICANT CHANGE UP (ref 0–0)
PLATELET # BLD AUTO: 154 K/UL — SIGNIFICANT CHANGE UP (ref 150–400)
POTASSIUM SERPL-MCNC: 4.2 MMOL/L — SIGNIFICANT CHANGE UP (ref 3.5–5.3)
POTASSIUM SERPL-SCNC: 4.2 MMOL/L — SIGNIFICANT CHANGE UP (ref 3.5–5.3)
PROT SERPL-MCNC: 7.5 GM/DL — SIGNIFICANT CHANGE UP (ref 6–8.3)
PROTHROM AB SERPL-ACNC: 11.2 SEC — SIGNIFICANT CHANGE UP (ref 10–12.9)
RBC # BLD: 4.23 M/UL — SIGNIFICANT CHANGE UP (ref 3.8–5.2)
RBC # FLD: 13.1 % — SIGNIFICANT CHANGE UP (ref 10.3–14.5)
SODIUM SERPL-SCNC: 140 MMOL/L — SIGNIFICANT CHANGE UP (ref 135–145)
WBC # BLD: 7.93 K/UL — SIGNIFICANT CHANGE UP (ref 3.8–10.5)
WBC # FLD AUTO: 7.93 K/UL — SIGNIFICANT CHANGE UP (ref 3.8–10.5)

## 2019-07-03 RX ORDER — ACETAMINOPHEN 500 MG
975 TABLET ORAL ONCE
Refills: 0 | Status: COMPLETED | OUTPATIENT
Start: 2019-07-03 | End: 2019-07-03

## 2019-07-03 RX ORDER — HYDROMORPHONE HYDROCHLORIDE 2 MG/ML
1 INJECTION INTRAMUSCULAR; INTRAVENOUS; SUBCUTANEOUS ONCE
Refills: 0 | Status: DISCONTINUED | OUTPATIENT
Start: 2019-07-03 | End: 2019-07-03

## 2019-07-03 RX ADMIN — Medication 975 MILLIGRAM(S): at 14:08

## 2019-07-03 RX ADMIN — Medication 975 MILLIGRAM(S): at 13:08

## 2019-07-03 NOTE — ED ADULT NURSE NOTE - NSIMPLEMENTINTERV_GEN_ALL_ED
Implemented All Fall Risk Interventions:  [place] to call system. Call bell, personal items and telephone within reach. Instruct patient to call for assistance. Room bathroom lighting operational. Non-slip footwear when patient is off stretcher. Physically safe environment: no spills, clutter or unnecessary equipment. Stretcher in lowest position, wheels locked, appropriate side rails in place. Provide visual cue, wrist band, yellow gown, etc. Monitor gait and stability. Monitor for mental status changes and reorient to person, place, and time. Review medications for side effects contributing to fall risk. Reinforce activity limits and safety measures with patient and family.

## 2019-07-03 NOTE — ED PROVIDER NOTE - CARE PROVIDER_API CALL
Matti Da Silva)  Surgery  [address], [address]  [place] [address]  Phone: [phone]  Fax: [phone]  Follow Up Time:

## 2019-07-03 NOTE — ED PROVIDER NOTE - CLINICAL SUMMARY MEDICAL DECISION MAKING FREE TEXT BOX
PAD with right leg pain. will perform duplex to assess present status PAD with right leg pain. will perform duplex to assess present status. patent flow. ok for dc home with vascular follow up

## 2019-07-03 NOTE — ED PROVIDER NOTE - OBJECTIVE STATEMENT
Pertinent PMH/PSH/FHx/SHx and Review of Systems contained within:  58F hx of cad sp 3 stents, last in may, and femoral artery disease pw pain in the rle for several weeks. patient notes that she has been seen for vascular disease and is meant to have surgery but no one is scheduling her. this is meant to be with [facility]. she notes pain in the right leg with walking and she lives by herself. she has no cp, sob, nausea, vomiting, fever, chills, rash, bleeding, numbness, weakness, ha, vision loss, rhinorrhea, dysuria  Fh and Sh not otherwise contributory  ROS otherwise negative

## 2019-07-03 NOTE — ED ADULT NURSE NOTE - OBJECTIVE STATEMENT
Pt c/o of B/L tigh pain radiading down to legs with associated SOB. denies any chest  pain. Patient with bilateral leg pain, patient has stents placed in may using right femoral artery, pt was diagnosed with partial artery blockage in right leg. faint pulses present, patient legs are warm

## 2019-07-03 NOTE — ED ADULT TRIAGE NOTE - CHIEF COMPLAINT QUOTE
Patient with bilateral leg pain, patient has stents placed in may using right femoral artery, pt was diagnosed with partial artery blockage in right leg, patient to have surgery for legs in the next few weeks, faint pulses present, patient legs are warm, able to move toes, capillary refill 4-5 seconds

## 2019-07-03 NOTE — ED PROVIDER NOTE - PHYSICAL EXAMINATION
Gen: Alert, NAD  Head: NC, AT   Eyes: PERRL, EOMI, normal lids/conjunctiva  ENT: normal hearing, patent oropharynx without erythema/exudate, uvula midline  Neck: supple, no tenderness, Trachea midline  Pulm: Bilateral BS, normal resp effort, no wheeze/stridor/retractions  CV: RRR, no M/R/G, 2+ radial pulses bl. right dp is 1+, left dp pulse is 2+  Abd: soft, NT/ND, +BS, no hepatosplenomegaly  Mskel: extremities x4 with normal ROM and no joint effusions. no ctl spine ttp.   Skin: no rash, no bruising   Neuro: AAOx3, no sensory/motor deficits, CN 2-12 intact

## 2019-07-03 NOTE — ED PROVIDER NOTE - NSFOLLOWUPINSTRUCTIONS_ED_ALL_ED_FT
Call the VASCULAR SURGEON for a follow up appointment.    The ARTERIES are seen to be open on today's work up. This means no surgery necessary in the immediate setting.

## 2019-07-03 NOTE — ED ADULT NURSE NOTE - PMH
Anxiety    Bipolar 1 disorder    CAD (coronary artery disease)    Heart disease    PTSD (post-traumatic stress disorder)    Secondary hypertension

## 2019-09-07 ENCOUNTER — INPATIENT (INPATIENT)
Facility: HOSPITAL | Age: 59
LOS: 2 days | Discharge: HOME | End: 2019-09-10
Attending: INTERNAL MEDICINE | Admitting: INTERNAL MEDICINE
Payer: MEDICAID

## 2019-09-07 VITALS
RESPIRATION RATE: 18 BRPM | DIASTOLIC BLOOD PRESSURE: 79 MMHG | OXYGEN SATURATION: 100 % | SYSTOLIC BLOOD PRESSURE: 116 MMHG | HEART RATE: 45 BPM | TEMPERATURE: 98 F

## 2019-09-07 DIAGNOSIS — E78.5 HYPERLIPIDEMIA, UNSPECIFIED: ICD-10-CM

## 2019-09-07 DIAGNOSIS — Z98.890 OTHER SPECIFIED POSTPROCEDURAL STATES: Chronic | ICD-10-CM

## 2019-09-07 DIAGNOSIS — K27.5 CHRONIC OR UNSPECIFIED PEPTIC ULCER, SITE UNSPECIFIED, WITH PERFORATION: Chronic | ICD-10-CM

## 2019-09-07 DIAGNOSIS — Z87.42 PERSONAL HISTORY OF OTHER DISEASES OF THE FEMALE GENITAL TRACT: Chronic | ICD-10-CM

## 2019-09-07 DIAGNOSIS — Z90.49 ACQUIRED ABSENCE OF OTHER SPECIFIED PARTS OF DIGESTIVE TRACT: Chronic | ICD-10-CM

## 2019-09-07 DIAGNOSIS — F31.9 BIPOLAR DISORDER, UNSPECIFIED: ICD-10-CM

## 2019-09-07 DIAGNOSIS — R07.9 CHEST PAIN, UNSPECIFIED: ICD-10-CM

## 2019-09-07 DIAGNOSIS — Z29.9 ENCOUNTER FOR PROPHYLACTIC MEASURES, UNSPECIFIED: ICD-10-CM

## 2019-09-07 DIAGNOSIS — Z98.62 PERIPHERAL VASCULAR ANGIOPLASTY STATUS: Chronic | ICD-10-CM

## 2019-09-07 DIAGNOSIS — I15.9 SECONDARY HYPERTENSION, UNSPECIFIED: ICD-10-CM

## 2019-09-07 PROBLEM — I25.10 ATHEROSCLEROTIC HEART DISEASE OF NATIVE CORONARY ARTERY WITHOUT ANGINA PECTORIS: Chronic | Status: ACTIVE | Noted: 2019-07-03

## 2019-09-07 PROBLEM — F43.10 POST-TRAUMATIC STRESS DISORDER, UNSPECIFIED: Chronic | Status: ACTIVE | Noted: 2019-07-03

## 2019-09-07 PROBLEM — F41.9 ANXIETY DISORDER, UNSPECIFIED: Chronic | Status: ACTIVE | Noted: 2019-07-03

## 2019-09-07 LAB
ALBUMIN SERPL ELPH-MCNC: 4 G/DL — SIGNIFICANT CHANGE UP (ref 3.3–5)
ALP SERPL-CCNC: 73 U/L — SIGNIFICANT CHANGE UP (ref 40–120)
ALT FLD-CCNC: 19 U/L — SIGNIFICANT CHANGE UP (ref 4–33)
ANION GAP SERPL CALC-SCNC: 13 MMO/L — SIGNIFICANT CHANGE UP (ref 7–14)
APTT BLD: 29.4 SEC — SIGNIFICANT CHANGE UP (ref 27.5–36.3)
AST SERPL-CCNC: 24 U/L — SIGNIFICANT CHANGE UP (ref 4–32)
BASOPHILS # BLD AUTO: 0.04 K/UL — SIGNIFICANT CHANGE UP (ref 0–0.2)
BASOPHILS NFR BLD AUTO: 0.4 % — SIGNIFICANT CHANGE UP (ref 0–2)
BILIRUB SERPL-MCNC: 0.5 MG/DL — SIGNIFICANT CHANGE UP (ref 0.2–1.2)
BUN SERPL-MCNC: 18 MG/DL — SIGNIFICANT CHANGE UP (ref 7–23)
CALCIUM SERPL-MCNC: 9.6 MG/DL — SIGNIFICANT CHANGE UP (ref 8.4–10.5)
CHLORIDE SERPL-SCNC: 108 MMOL/L — HIGH (ref 98–107)
CO2 SERPL-SCNC: 21 MMOL/L — LOW (ref 22–31)
CREAT SERPL-MCNC: 0.95 MG/DL — SIGNIFICANT CHANGE UP (ref 0.5–1.3)
EOSINOPHIL # BLD AUTO: 0.09 K/UL — SIGNIFICANT CHANGE UP (ref 0–0.5)
EOSINOPHIL NFR BLD AUTO: 0.9 % — SIGNIFICANT CHANGE UP (ref 0–6)
GLUCOSE SERPL-MCNC: 110 MG/DL — HIGH (ref 70–99)
HCT VFR BLD CALC: 43.8 % — SIGNIFICANT CHANGE UP (ref 34.5–45)
HGB BLD-MCNC: 14.3 G/DL — SIGNIFICANT CHANGE UP (ref 11.5–15.5)
IMM GRANULOCYTES NFR BLD AUTO: 0.3 % — SIGNIFICANT CHANGE UP (ref 0–1.5)
INR BLD: 0.97 — SIGNIFICANT CHANGE UP (ref 0.88–1.17)
LYMPHOCYTES # BLD AUTO: 2.33 K/UL — SIGNIFICANT CHANGE UP (ref 1–3.3)
LYMPHOCYTES # BLD AUTO: 23.5 % — SIGNIFICANT CHANGE UP (ref 13–44)
MCHC RBC-ENTMCNC: 32 PG — SIGNIFICANT CHANGE UP (ref 27–34)
MCHC RBC-ENTMCNC: 32.6 % — SIGNIFICANT CHANGE UP (ref 32–36)
MCV RBC AUTO: 98 FL — SIGNIFICANT CHANGE UP (ref 80–100)
MONOCYTES # BLD AUTO: 0.66 K/UL — SIGNIFICANT CHANGE UP (ref 0–0.9)
MONOCYTES NFR BLD AUTO: 6.6 % — SIGNIFICANT CHANGE UP (ref 2–14)
NEUTROPHILS # BLD AUTO: 6.78 K/UL — SIGNIFICANT CHANGE UP (ref 1.8–7.4)
NEUTROPHILS NFR BLD AUTO: 68.3 % — SIGNIFICANT CHANGE UP (ref 43–77)
NRBC # FLD: 0 K/UL — SIGNIFICANT CHANGE UP (ref 0–0)
NT-PROBNP SERPL-SCNC: 57.06 PG/ML — SIGNIFICANT CHANGE UP
PLATELET # BLD AUTO: 123 K/UL — LOW (ref 150–400)
PMV BLD: 10.6 FL — SIGNIFICANT CHANGE UP (ref 7–13)
POTASSIUM SERPL-MCNC: 4.3 MMOL/L — SIGNIFICANT CHANGE UP (ref 3.5–5.3)
POTASSIUM SERPL-SCNC: 4.3 MMOL/L — SIGNIFICANT CHANGE UP (ref 3.5–5.3)
PROT SERPL-MCNC: 7 G/DL — SIGNIFICANT CHANGE UP (ref 6–8.3)
PROTHROM AB SERPL-ACNC: 11 SEC — SIGNIFICANT CHANGE UP (ref 9.8–13.1)
RBC # BLD: 4.47 M/UL — SIGNIFICANT CHANGE UP (ref 3.8–5.2)
RBC # FLD: 13 % — SIGNIFICANT CHANGE UP (ref 10.3–14.5)
SODIUM SERPL-SCNC: 142 MMOL/L — SIGNIFICANT CHANGE UP (ref 135–145)
TROPONIN T, HIGH SENSITIVITY: 11 NG/L — SIGNIFICANT CHANGE UP (ref ?–14)
TROPONIN T, HIGH SENSITIVITY: 9 NG/L — SIGNIFICANT CHANGE UP (ref ?–14)
WBC # BLD: 9.93 K/UL — SIGNIFICANT CHANGE UP (ref 3.8–10.5)
WBC # FLD AUTO: 9.93 K/UL — SIGNIFICANT CHANGE UP (ref 3.8–10.5)

## 2019-09-07 PROCEDURE — 93010 ELECTROCARDIOGRAM REPORT: CPT

## 2019-09-07 PROCEDURE — 71046 X-RAY EXAM CHEST 2 VIEWS: CPT | Mod: 26

## 2019-09-07 PROCEDURE — 99285 EMERGENCY DEPT VISIT HI MDM: CPT | Mod: 25

## 2019-09-07 RX ORDER — ONDANSETRON 8 MG/1
4 TABLET, FILM COATED ORAL ONCE
Refills: 0 | Status: COMPLETED | OUTPATIENT
Start: 2019-09-07 | End: 2019-09-07

## 2019-09-07 RX ORDER — CLONAZEPAM 1 MG
0.5 TABLET ORAL THREE TIMES A DAY
Refills: 0 | Status: DISCONTINUED | OUTPATIENT
Start: 2019-09-07 | End: 2019-09-10

## 2019-09-07 RX ORDER — MORPHINE SULFATE 50 MG/1
4 CAPSULE, EXTENDED RELEASE ORAL ONCE
Refills: 0 | Status: DISCONTINUED | OUTPATIENT
Start: 2019-09-07 | End: 2019-09-07

## 2019-09-07 RX ORDER — ACETAMINOPHEN 500 MG
650 TABLET ORAL EVERY 6 HOURS
Refills: 0 | Status: DISCONTINUED | OUTPATIENT
Start: 2019-09-07 | End: 2019-09-10

## 2019-09-07 RX ORDER — LEVOTHYROXINE SODIUM 125 MCG
0 TABLET ORAL
Qty: 0 | Refills: 0 | DISCHARGE

## 2019-09-07 RX ORDER — CLONAZEPAM 1 MG
0 TABLET ORAL
Qty: 0 | Refills: 0 | DISCHARGE

## 2019-09-07 RX ORDER — CETIRIZINE HYDROCHLORIDE 10 MG/1
1 TABLET ORAL
Qty: 0 | Refills: 0 | DISCHARGE

## 2019-09-07 RX ORDER — TICAGRELOR 90 MG/1
90 TABLET ORAL EVERY 12 HOURS
Refills: 0 | Status: DISCONTINUED | OUTPATIENT
Start: 2019-09-07 | End: 2019-09-09

## 2019-09-07 RX ORDER — ASPIRIN/CALCIUM CARB/MAGNESIUM 324 MG
81 TABLET ORAL DAILY
Refills: 0 | Status: DISCONTINUED | OUTPATIENT
Start: 2019-09-07 | End: 2019-09-10

## 2019-09-07 RX ORDER — ENOXAPARIN SODIUM 100 MG/ML
40 INJECTION SUBCUTANEOUS AT BEDTIME
Refills: 0 | Status: DISCONTINUED | OUTPATIENT
Start: 2019-09-07 | End: 2019-09-07

## 2019-09-07 RX ORDER — ATORVASTATIN CALCIUM 80 MG/1
40 TABLET, FILM COATED ORAL AT BEDTIME
Refills: 0 | Status: DISCONTINUED | OUTPATIENT
Start: 2019-09-07 | End: 2019-09-10

## 2019-09-07 RX ORDER — LISINOPRIL 2.5 MG/1
5 TABLET ORAL DAILY
Refills: 0 | Status: DISCONTINUED | OUTPATIENT
Start: 2019-09-07 | End: 2019-09-10

## 2019-09-07 RX ORDER — INFLUENZA VIRUS VACCINE 15; 15; 15; 15 UG/.5ML; UG/.5ML; UG/.5ML; UG/.5ML
0.5 SUSPENSION INTRAMUSCULAR ONCE
Refills: 0 | Status: DISCONTINUED | OUTPATIENT
Start: 2019-09-07 | End: 2019-09-10

## 2019-09-07 RX ORDER — SODIUM CHLORIDE 9 MG/ML
1000 INJECTION INTRAMUSCULAR; INTRAVENOUS; SUBCUTANEOUS ONCE
Refills: 0 | Status: COMPLETED | OUTPATIENT
Start: 2019-09-07 | End: 2019-09-07

## 2019-09-07 RX ORDER — METOPROLOL TARTRATE 50 MG
25 TABLET ORAL
Refills: 0 | Status: DISCONTINUED | OUTPATIENT
Start: 2019-09-07 | End: 2019-09-07

## 2019-09-07 RX ADMIN — TICAGRELOR 90 MILLIGRAM(S): 90 TABLET ORAL at 18:18

## 2019-09-07 RX ADMIN — Medication 81 MILLIGRAM(S): at 14:18

## 2019-09-07 RX ADMIN — Medication 0.5 MILLIGRAM(S): at 14:18

## 2019-09-07 RX ADMIN — Medication 0.1 MILLIGRAM(S): at 18:18

## 2019-09-07 RX ADMIN — Medication 650 MILLIGRAM(S): at 12:50

## 2019-09-07 RX ADMIN — SODIUM CHLORIDE 1000 MILLILITER(S): 9 INJECTION INTRAMUSCULAR; INTRAVENOUS; SUBCUTANEOUS at 11:02

## 2019-09-07 RX ADMIN — Medication 0.5 MILLIGRAM(S): at 22:33

## 2019-09-07 RX ADMIN — ATORVASTATIN CALCIUM 40 MILLIGRAM(S): 80 TABLET, FILM COATED ORAL at 22:33

## 2019-09-07 NOTE — H&P ADULT - NSICDXPASTMEDICALHX_GEN_ALL_CORE_FT
PAST MEDICAL HISTORY:  Anxiety     Bipolar 1 disorder     CAD (coronary artery disease)     Heart disease     Hyperlipidemia     PTSD (post-traumatic stress disorder)     Secondary hypertension

## 2019-09-07 NOTE — ED PROVIDER NOTE - ATTENDING CONTRIBUTION TO CARE
Dr. Ulloa:  I performed a face to face bedside interview with patient regarding history of present illness, review of symptoms and past medical history. I completed an independent physical exam.  I have discussed patient's plan of care with PA.   I agree with note as stated above, having amended the EMR as needed to reflect my findings.   This includes HISTORY OF PRESENT ILLNESS, HIV, PAST MEDICAL/SURGICAL/FAMILY/SOCIAL HISTORY, ALLERGIES AND HOME MEDICATIONS, REVIEW OF SYSTEMS, PHYSICAL EXAM, and any PROGRESS NOTES during the time I functioned as the attending physician for this patient.    58F h/o HTN, CAD with stents on Brilinta, presents with two days of substernal CP radiating to right shoulder and jaw.  +Associated nausea and SOB and diaphoresis.  given aspirin by EMS    Exam:  - nad  - rrr  - ctab  - abd soft ntnd    A/P  - CP, concern for ACS  - cbc, cmp, trop, ekg, cxr

## 2019-09-07 NOTE — H&P ADULT - RS GEN PE MLT RESP DETAILS PC
no chest wall tenderness/respirations non-labored/breath sounds equal/clear to auscultation bilaterally/airway patent/good air movement

## 2019-09-07 NOTE — ED PROVIDER NOTE - OBJECTIVE STATEMENT
58 year old female with a PMHx of HTN (on lisinopril/metoprolol), HLS< CAD - s/p stents (on Brilinta) x3 pw 2 days of midsternal exertional chest pain. Pain is a pressure in nature, becam constant last night, initially occurred after exerting herself, associated with nausea, vomiting, diaphoresis and SOB. Denies f/c, cough, abdominal pain, dysuria, hematuria, melena, hematochezia, diarrhea, constipation, LE edema. 58 year old female with a PMHx of HTN (on lisinopril/metoprolol), HLD, CAD - s/p stents (on Brilinta) x3 pw 2 days of midsternal exertional chest pain. Pain is a pressure in nature, becam constant last night, initially occurred after exerting herself, associated with nausea, vomiting, diaphoresis and SOB. Denies f/c, cough, abdominal pain, dysuria, hematuria, melena, hematochezia, diarrhea, constipation, LE edema.

## 2019-09-07 NOTE — ED ADULT TRIAGE NOTE - CHIEF COMPLAINT QUOTE
A&OX3 c/o chest pain x 2 days with n/v/ and diaphoretic, pt arrived with non rebreather on, 20g Left hand with NS infusing A&OX3 c/o chest pain x 2 days with n/v/ as per EMs pt HR ranging from 45-55 bpm and pt was diaphoretic before arrival, pt arrived with non rebreather on, 20g Left hand with NS infusing

## 2019-09-07 NOTE — H&P ADULT - NSICDXFAMILYHX_GEN_ALL_CORE_FT
FAMILY HISTORY:  Family history of breast cancer in mother, Mother  at age 55 of breast cancer  FH: lung cancer, Father  of Lung Cancer @ age 55; Brother currently has lung cancer  FHx: diabetes mellitus, sister

## 2019-09-07 NOTE — H&P ADULT - NSHPSOCIALHISTORY_GEN_ALL_CORE
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

## 2019-09-07 NOTE — H&P ADULT - NSICDXPASTSURGICALHX_GEN_ALL_CORE_FT
PAST SURGICAL HISTORY:  H/O angioplasty stents placed in 2007 & 5/2019    History of appendectomy     History of bilateral breast reduction surgery     History of lumpectomy     History of ovarian cyst     Perforated ulcer

## 2019-09-07 NOTE — H&P ADULT - NEGATIVE OPHTHALMOLOGIC SYMPTOMS
no loss of vision R/no blurred vision L/no loss of vision L/no photophobia/no blurred vision R/no diplopia

## 2019-09-07 NOTE — H&P ADULT - NSHPOUTPATIENTPROVIDERS_GEN_ALL_CORE
PCP: Dr. Avani Franklin [phone]  Cardiologist @ [facility]: Dr. Miguel Olson [phone]

## 2019-09-07 NOTE — ED PROVIDER NOTE - CLINICAL SUMMARY MEDICAL DECISION MAKING FREE TEXT BOX
58 year old female with a PMHx of HTN (on lisinopril/metoprolol), HLS< CAD - s/p stents (on Brilinta) x3 pw 2 days of midsternal exertional chest pain.   Plan: labs, EKG, chest xray

## 2019-09-07 NOTE — ED ADULT NURSE NOTE - OBJECTIVE STATEMENT
Receive pt. in room 10 alert and oriented x 4 presenting to the ER via EMS with complaints of midsternal chest pain. Pt. have a history of CAD with cardiac stents, Bipolar 1 disorder, Anxiety, PTSD. Pt. stated " for two days in the middle of my chest has been hurting on and off  it is getting worse, sometimes I feel like an elephant is sitting on my chest". Pt. placed on cardiac monitor sinus tk, no respiratory distress, labs sent will continue to monitor.

## 2019-09-07 NOTE — H&P ADULT - ASSESSMENT
57 y/o female, with a PmHx of HTN, HLD, CAD w/ stents, Anxiety, Bipolar, Chronic back pain from 4 herniated discs, PTSD, presented to the [facility] ED with chest pain. Admitted to telemetry for r/o acs.

## 2019-09-07 NOTE — H&P ADULT - HISTORY OF PRESENT ILLNESS
59 y/o female, with a PmHx of HTN, HLD, CAD w/ stents, Anxiety, Bipolar, Chronic back pain from 4 herniated discs, PTSD, presented to the [facility] ED with chest pain. Pt states for the past 2 days she has been having mild intermittent chest pain and thought it would go away so she ignored the pain. This morning, around 0500hrs, she states she woke up from sleep with a 7/10 heaviness sensation to the center of chest with radiation to the left side of her chest and neck and was associated with HA, dizziness, diaphoresis and nausea. She states she has had a stent placed in 2007 and then recently she was having chest pain and had gone to [facility] in 5/2019 where she states she had 2 Xience stents placed to the mid LAD. She states the pain today was slightly different from when she had her stents because when the stents were placed she states it felt she had an "elephant" sitting on her chest and toady the pain was a "shooting" pressure like pain. She denies any fever, chills, sob, emesis, sick contacts, recent travel. Currently, she states the pain has come down to about a 3/10. She appears comfortable at this time and is now being admitted to telemetry for r/o acs.

## 2019-09-07 NOTE — H&P ADULT - NSHPPHYSICALEXAM_GEN_ALL_CORE
Vital Signs Last 24 Hrs  T(C): 36.2 (07 Sep 2019 09:40), Max: 36.7 (07 Sep 2019 09:15)  T(F): 97.2 (07 Sep 2019 09:40), Max: 98 (07 Sep 2019 09:15)  HR: 50 (07 Sep 2019 12:10) (45 - 51)  BP: 103/39 (07 Sep 2019 12:10) (92/51 - 116/79)  BP(mean): 57 (07 Sep 2019 12:10) (57 - 57)  RR: 14 (07 Sep 2019 12:10) (14 - 26)  SpO2: 100% (07 Sep 2019 12:10) (97% - 100%)    EKG: Sinus tk @ 52, no changes

## 2019-09-07 NOTE — ED ADULT NURSE NOTE - CHIEF COMPLAINT QUOTE
A&OX3 c/o chest pain x 2 days with n/v/ as per EMs pt HR ranging from 45-55 bpm and pt was diaphoretic before arrival, pt arrived with non rebreather on, 20g Left hand with NS infusing

## 2019-09-08 LAB
ANION GAP SERPL CALC-SCNC: 14 MMO/L — SIGNIFICANT CHANGE UP (ref 7–14)
BASOPHILS # BLD AUTO: 0.03 K/UL — SIGNIFICANT CHANGE UP (ref 0–0.2)
BASOPHILS NFR BLD AUTO: 0.5 % — SIGNIFICANT CHANGE UP (ref 0–2)
BUN SERPL-MCNC: 24 MG/DL — HIGH (ref 7–23)
CALCIUM SERPL-MCNC: 9.1 MG/DL — SIGNIFICANT CHANGE UP (ref 8.4–10.5)
CHLORIDE SERPL-SCNC: 106 MMOL/L — SIGNIFICANT CHANGE UP (ref 98–107)
CO2 SERPL-SCNC: 20 MMOL/L — LOW (ref 22–31)
CREAT SERPL-MCNC: 0.94 MG/DL — SIGNIFICANT CHANGE UP (ref 0.5–1.3)
EOSINOPHIL # BLD AUTO: 0.17 K/UL — SIGNIFICANT CHANGE UP (ref 0–0.5)
EOSINOPHIL NFR BLD AUTO: 2.8 % — SIGNIFICANT CHANGE UP (ref 0–6)
GLUCOSE SERPL-MCNC: 103 MG/DL — HIGH (ref 70–99)
HBA1C BLD-MCNC: 5.1 % — SIGNIFICANT CHANGE UP (ref 4–5.6)
HCT VFR BLD CALC: 45.2 % — HIGH (ref 34.5–45)
HGB BLD-MCNC: 14.4 G/DL — SIGNIFICANT CHANGE UP (ref 11.5–15.5)
IMM GRANULOCYTES NFR BLD AUTO: 0.3 % — SIGNIFICANT CHANGE UP (ref 0–1.5)
LYMPHOCYTES # BLD AUTO: 2.44 K/UL — SIGNIFICANT CHANGE UP (ref 1–3.3)
LYMPHOCYTES # BLD AUTO: 40.1 % — SIGNIFICANT CHANGE UP (ref 13–44)
MAGNESIUM SERPL-MCNC: 1.9 MG/DL — SIGNIFICANT CHANGE UP (ref 1.6–2.6)
MCHC RBC-ENTMCNC: 31.9 % — LOW (ref 32–36)
MCHC RBC-ENTMCNC: 32.1 PG — SIGNIFICANT CHANGE UP (ref 27–34)
MCV RBC AUTO: 100.7 FL — HIGH (ref 80–100)
MONOCYTES # BLD AUTO: 0.52 K/UL — SIGNIFICANT CHANGE UP (ref 0–0.9)
MONOCYTES NFR BLD AUTO: 8.5 % — SIGNIFICANT CHANGE UP (ref 2–14)
NEUTROPHILS # BLD AUTO: 2.91 K/UL — SIGNIFICANT CHANGE UP (ref 1.8–7.4)
NEUTROPHILS NFR BLD AUTO: 47.8 % — SIGNIFICANT CHANGE UP (ref 43–77)
NRBC # FLD: 0 K/UL — SIGNIFICANT CHANGE UP (ref 0–0)
PLATELET # BLD AUTO: 107 K/UL — LOW (ref 150–400)
PMV BLD: 11.6 FL — SIGNIFICANT CHANGE UP (ref 7–13)
POTASSIUM SERPL-MCNC: 4.3 MMOL/L — SIGNIFICANT CHANGE UP (ref 3.5–5.3)
POTASSIUM SERPL-SCNC: 4.3 MMOL/L — SIGNIFICANT CHANGE UP (ref 3.5–5.3)
RBC # BLD: 4.49 M/UL — SIGNIFICANT CHANGE UP (ref 3.8–5.2)
RBC # FLD: 13 % — SIGNIFICANT CHANGE UP (ref 10.3–14.5)
SODIUM SERPL-SCNC: 140 MMOL/L — SIGNIFICANT CHANGE UP (ref 135–145)
TSH SERPL-MCNC: 2.87 UIU/ML — SIGNIFICANT CHANGE UP (ref 0.27–4.2)
WBC # BLD: 6.09 K/UL — SIGNIFICANT CHANGE UP (ref 3.8–10.5)
WBC # FLD AUTO: 6.09 K/UL — SIGNIFICANT CHANGE UP (ref 3.8–10.5)

## 2019-09-08 RX ADMIN — LISINOPRIL 5 MILLIGRAM(S): 2.5 TABLET ORAL at 17:36

## 2019-09-08 RX ADMIN — Medication 650 MILLIGRAM(S): at 07:31

## 2019-09-08 RX ADMIN — Medication 0.5 MILLIGRAM(S): at 05:41

## 2019-09-08 RX ADMIN — TICAGRELOR 90 MILLIGRAM(S): 90 TABLET ORAL at 05:40

## 2019-09-08 RX ADMIN — Medication 81 MILLIGRAM(S): at 14:06

## 2019-09-08 RX ADMIN — ATORVASTATIN CALCIUM 40 MILLIGRAM(S): 80 TABLET, FILM COATED ORAL at 21:31

## 2019-09-08 RX ADMIN — Medication 650 MILLIGRAM(S): at 08:31

## 2019-09-08 RX ADMIN — Medication 0.5 MILLIGRAM(S): at 21:33

## 2019-09-08 RX ADMIN — Medication 0.5 MILLIGRAM(S): at 14:06

## 2019-09-08 RX ADMIN — Medication 0.1 MILLIGRAM(S): at 17:36

## 2019-09-08 RX ADMIN — Medication 650 MILLIGRAM(S): at 14:13

## 2019-09-08 RX ADMIN — Medication 650 MILLIGRAM(S): at 15:13

## 2019-09-08 RX ADMIN — TICAGRELOR 90 MILLIGRAM(S): 90 TABLET ORAL at 17:36

## 2019-09-08 RX ADMIN — Medication 0.1 MILLIGRAM(S): at 05:41

## 2019-09-09 LAB
ANION GAP SERPL CALC-SCNC: 14 MMO/L — SIGNIFICANT CHANGE UP (ref 7–14)
BASOPHILS # BLD AUTO: 0.04 K/UL — SIGNIFICANT CHANGE UP (ref 0–0.2)
BASOPHILS NFR BLD AUTO: 0.5 % — SIGNIFICANT CHANGE UP (ref 0–2)
BUN SERPL-MCNC: 21 MG/DL — SIGNIFICANT CHANGE UP (ref 7–23)
CALCIUM SERPL-MCNC: 9.9 MG/DL — SIGNIFICANT CHANGE UP (ref 8.4–10.5)
CHLORIDE SERPL-SCNC: 110 MMOL/L — HIGH (ref 98–107)
CO2 SERPL-SCNC: 23 MMOL/L — SIGNIFICANT CHANGE UP (ref 22–31)
CREAT SERPL-MCNC: 1.14 MG/DL — SIGNIFICANT CHANGE UP (ref 0.5–1.3)
EOSINOPHIL # BLD AUTO: 0.16 K/UL — SIGNIFICANT CHANGE UP (ref 0–0.5)
EOSINOPHIL NFR BLD AUTO: 2 % — SIGNIFICANT CHANGE UP (ref 0–6)
GLUCOSE SERPL-MCNC: 102 MG/DL — HIGH (ref 70–99)
HCT VFR BLD CALC: 48 % — HIGH (ref 34.5–45)
HCV AB S/CO SERPL IA: 0.14 S/CO — SIGNIFICANT CHANGE UP (ref 0–0.99)
HCV AB SERPL-IMP: SIGNIFICANT CHANGE UP
HGB BLD-MCNC: 15 G/DL — SIGNIFICANT CHANGE UP (ref 11.5–15.5)
IMM GRANULOCYTES NFR BLD AUTO: 0.5 % — SIGNIFICANT CHANGE UP (ref 0–1.5)
LYMPHOCYTES # BLD AUTO: 4.2 K/UL — HIGH (ref 1–3.3)
LYMPHOCYTES # BLD AUTO: 52 % — HIGH (ref 13–44)
MAGNESIUM SERPL-MCNC: 2 MG/DL — SIGNIFICANT CHANGE UP (ref 1.6–2.6)
MCHC RBC-ENTMCNC: 31.3 % — LOW (ref 32–36)
MCHC RBC-ENTMCNC: 31.8 PG — SIGNIFICANT CHANGE UP (ref 27–34)
MCV RBC AUTO: 101.9 FL — HIGH (ref 80–100)
MONOCYTES # BLD AUTO: 0.77 K/UL — SIGNIFICANT CHANGE UP (ref 0–0.9)
MONOCYTES NFR BLD AUTO: 9.5 % — SIGNIFICANT CHANGE UP (ref 2–14)
NEUTROPHILS # BLD AUTO: 2.87 K/UL — SIGNIFICANT CHANGE UP (ref 1.8–7.4)
NEUTROPHILS NFR BLD AUTO: 35.5 % — LOW (ref 43–77)
NRBC # FLD: 0 K/UL — SIGNIFICANT CHANGE UP (ref 0–0)
PLATELET # BLD AUTO: 100 K/UL — LOW (ref 150–400)
PMV BLD: 11.3 FL — SIGNIFICANT CHANGE UP (ref 7–13)
POTASSIUM SERPL-MCNC: 5 MMOL/L — SIGNIFICANT CHANGE UP (ref 3.5–5.3)
POTASSIUM SERPL-SCNC: 5 MMOL/L — SIGNIFICANT CHANGE UP (ref 3.5–5.3)
RBC # BLD: 4.71 M/UL — SIGNIFICANT CHANGE UP (ref 3.8–5.2)
RBC # FLD: 12.8 % — SIGNIFICANT CHANGE UP (ref 10.3–14.5)
SODIUM SERPL-SCNC: 147 MMOL/L — HIGH (ref 135–145)
WBC # BLD: 8.08 K/UL — SIGNIFICANT CHANGE UP (ref 3.8–10.5)
WBC # FLD AUTO: 8.08 K/UL — SIGNIFICANT CHANGE UP (ref 3.8–10.5)

## 2019-09-09 PROCEDURE — 93306 TTE W/DOPPLER COMPLETE: CPT | Mod: 26

## 2019-09-09 PROCEDURE — 99152 MOD SED SAME PHYS/QHP 5/>YRS: CPT

## 2019-09-09 PROCEDURE — 93458 L HRT ARTERY/VENTRICLE ANGIO: CPT | Mod: 26

## 2019-09-09 RX ORDER — CLOPIDOGREL BISULFATE 75 MG/1
75 TABLET, FILM COATED ORAL DAILY
Refills: 0 | Status: DISCONTINUED | OUTPATIENT
Start: 2019-09-10 | End: 2019-09-10

## 2019-09-09 RX ORDER — ATROPINE SULFATE 0.1 MG/ML
0.5 SYRINGE (ML) INJECTION ONCE
Refills: 0 | Status: COMPLETED | OUTPATIENT
Start: 2019-09-09 | End: 2019-09-09

## 2019-09-09 RX ORDER — SODIUM CHLORIDE 9 MG/ML
500 INJECTION INTRAMUSCULAR; INTRAVENOUS; SUBCUTANEOUS ONCE
Refills: 0 | Status: COMPLETED | OUTPATIENT
Start: 2019-09-09 | End: 2019-09-09

## 2019-09-09 RX ORDER — DIAZEPAM 5 MG
5 TABLET ORAL ONCE
Refills: 0 | Status: DISCONTINUED | OUTPATIENT
Start: 2019-09-09 | End: 2019-09-09

## 2019-09-09 RX ADMIN — LISINOPRIL 5 MILLIGRAM(S): 2.5 TABLET ORAL at 17:16

## 2019-09-09 RX ADMIN — Medication 650 MILLIGRAM(S): at 16:34

## 2019-09-09 RX ADMIN — Medication 0.1 MILLIGRAM(S): at 17:16

## 2019-09-09 RX ADMIN — Medication 81 MILLIGRAM(S): at 14:43

## 2019-09-09 RX ADMIN — Medication 0.5 MILLIGRAM(S): at 12:34

## 2019-09-09 RX ADMIN — Medication 0.5 MILLIGRAM(S): at 22:22

## 2019-09-09 RX ADMIN — ATORVASTATIN CALCIUM 40 MILLIGRAM(S): 80 TABLET, FILM COATED ORAL at 22:22

## 2019-09-09 RX ADMIN — Medication 650 MILLIGRAM(S): at 17:34

## 2019-09-09 RX ADMIN — TICAGRELOR 90 MILLIGRAM(S): 90 TABLET ORAL at 08:32

## 2019-09-09 RX ADMIN — Medication 0.5 MILLIGRAM(S): at 14:42

## 2019-09-09 RX ADMIN — Medication 0.1 MILLIGRAM(S): at 05:30

## 2019-09-09 RX ADMIN — Medication 0.5 MILLIGRAM(S): at 05:30

## 2019-09-09 RX ADMIN — Medication 5 MILLIGRAM(S): at 10:12

## 2019-09-09 NOTE — PROGRESS NOTE ADULT - SUBJECTIVE AND OBJECTIVE BOX
PRESENTING CC:Chest pain    SUBJ: 59 y/o female, with a PmHx of HTN, HLD, CAD w/ stents, Anxiety, Bipolar, Chronic back pain from 4 herniated discs, PTSD, presented to the [facility] ED with chest pain.Scheduled for cardiac cath today      PMH -reviewed admission note, no change since admission  Heart failure: acute [ ] chronic [ ] acute or chronic [ ] diastolic [ ] systolic [ ] combined systolic and diastolic[ ]  ERIC: ATN[ ] renal medullary necrosis [ ] CKD I [ ]CKDII [ ]CKD III [ ]CKD IV [ ]CKD V [ ]Other pathological lesions [ ]    MEDICATIONS  (STANDING):  aspirin enteric coated 81 milliGRAM(s) Oral daily  atorvastatin 40 milliGRAM(s) Oral at bedtime  clonazePAM  Tablet 0.5 milliGRAM(s) Oral three times a day  cloNIDine 0.1 milliGRAM(s) Oral two times a day  clopidogrel Tablet 75 milliGRAM(s) Oral daily  influenza   Vaccine 0.5 milliLiter(s) IntraMuscular once  lisinopril 5 milliGRAM(s) Oral daily    MEDICATIONS  (PRN):  acetaminophen   Tablet .. 650 milliGRAM(s) Oral every 6 hours PRN Temp greater or equal to 38C (100.4F), Mild Pain (1 - 3), Moderate Pain (4 - 6)          FAMILY HISTORY:  FH: lung cancer: Father  of Lung Cancer @ age 55; Brother currently has lung cancer  Family history of breast cancer in mother: Mother  at age 55 of breast cancer  FHx: diabetes mellitus: sister    No family history of premature coronary artery disease or sudden cardiac death      REVIEW OF SYSTEMS:  Constitutional: [ ] fever, [ ]weight loss,  [ ]fatigue  Eyes: [ ] visual changes  Respiratory: [x ]shortness of breath;  [ ] cough, [ ]wheezing, [ ]chills, [ ]hemoptysis  Cardiovascular: [x ] chest pain, [ ]palpitations, [ ]dizziness,  [ ]leg swelling[ ]orthopnea[ ]PND  Gastrointestinal: [ ] abdominal pain, [ ]nausea, [ ]vomiting,  [ ]diarrhea   Genitourinary: [ ] dysuria, [ ] hematuria  Neurologic: [ ] headaches [ ] tremors[ ]weakness  Skin: [ ] itching, [ ]burning, [ ] rashes  Endocrine: [ ] heat or cold intolerance  Musculoskeletal: [ ] joint pain or swelling; [ ] muscle, back, or extremity pain  Psychiatric: [ ] depression, [ ]anxiety, [ ]mood swings, or [ ]difficulty sleeping  Hematologic: [ ] easy bruising, [ ] bleeding gums    [x] All remaining systems negative except as per above.   [ ]Unable to obtain.        PHYSICAL EXAM:  General: No acute distress BMI-28  HEENT: EOMI, PERRL  Neck: Supple, [ ] JVD  Lungs: Equal air entry bilaterally; [ ] rales [ ] wheezing [ ] rhonchi  Heart: Regular rate and rhythm; [x] murmur  2 /6 [x ] systolic [ ] diastolic [ ] radiation[ ] rubs [ ]  gallops  Abdomen: Nontender, bowel sounds present  Extremities: No clubbing, cyanosis, [ ] edema  Nervous system:  Alert & Oriented X3, no focal deficits  Psychiatric: Normal affect  Skin: No rashes or lesions    LABS:      147<H>  |  110<H>  |  21  ----------------------------<  102<H>  5.0   |  23  |  1.14    Ca    9.9      09 Sep 2019 05:10  Mg     2.0           Creatinine Trend: 1.14<--, 0.94<--, 0.95<--                        15.0   8.08  )-----------( 100      ( 09 Sep 2019 05:10 )             48.0     IMPRESSION AND PLAN:      59 y/o female, with a PmHx of HTN, HLD, CAD w/ stents, Anxiety, Bipolar, Chronic back pain from 4 herniated discs, PTSD, presented to the [facility] ED with chest pain. Admitted to telemetry for r/o acs.    Problem/Plan - 1:  ·  Problem: Chest pain.  Plan: EKG/Telemetry, ce x 2, mg, tsh, echo,   For cardiac cath today  Non compliant with Brilinta 2/2 dyspnea will change to Clopidogrel      Problem/Plan - 2:  ·  Problem: Secondary hypertension.  Plan: monitor bp, cont meds, adjust as needed.     Problem/Plan - 3:  ·  Problem: Bipolar 1 disorder.  Plan: cont home medications.     Problem/Plan - 4:  ·  Problem: Hyperlipidemia.  Plan: cont statin.       Problem/Plan - 5:  ·  Problem: Need for prophylactic measure.  Plan: [place] stockings, has thrombocytopenia and already on aspirin and brilinta.

## 2019-09-09 NOTE — CHART NOTE - NSCHARTNOTEFT_GEN_A_CORE
The patient is s/p cardiac cath, patent LAD stent, mRCA 30%, was noted to have worsening bradycardial post cardiac cath, most likely medication induced. The patient received Diazepam 5 mg PO x 1 prior to cath, Fentanyl in cath lab. In cath lab Heart rate 37-45. /59. The patient c/o dizziness. Dr Okeefe was made aware, recommended to continue observation. However the symptoms didn't improve, Atropine 0.5 mg IV x 1 given with improvement in symptoms and heart rate (53-55). Dr. Green called and was made aware,  EP consult called.   As per Dr. Green, due to patient's intolerance to Brilinta (c/o SOB), will d/c Brilinta and start Plavix.

## 2019-09-10 ENCOUNTER — TRANSCRIPTION ENCOUNTER (OUTPATIENT)
Age: 59
End: 2019-09-10

## 2019-09-10 VITALS
DIASTOLIC BLOOD PRESSURE: 79 MMHG | SYSTOLIC BLOOD PRESSURE: 108 MMHG | TEMPERATURE: 99 F | HEART RATE: 66 BPM | RESPIRATION RATE: 16 BRPM | OXYGEN SATURATION: 100 %

## 2019-09-10 LAB
ANION GAP SERPL CALC-SCNC: 11 MMO/L — SIGNIFICANT CHANGE UP (ref 7–14)
BASOPHILS # BLD AUTO: 0.04 K/UL — SIGNIFICANT CHANGE UP (ref 0–0.2)
BASOPHILS NFR BLD AUTO: 0.6 % — SIGNIFICANT CHANGE UP (ref 0–2)
BUN SERPL-MCNC: 16 MG/DL — SIGNIFICANT CHANGE UP (ref 7–23)
CALCIUM SERPL-MCNC: 9.2 MG/DL — SIGNIFICANT CHANGE UP (ref 8.4–10.5)
CHLORIDE SERPL-SCNC: 108 MMOL/L — HIGH (ref 98–107)
CO2 SERPL-SCNC: 23 MMOL/L — SIGNIFICANT CHANGE UP (ref 22–31)
CREAT SERPL-MCNC: 1.05 MG/DL — SIGNIFICANT CHANGE UP (ref 0.5–1.3)
EOSINOPHIL # BLD AUTO: 0.19 K/UL — SIGNIFICANT CHANGE UP (ref 0–0.5)
EOSINOPHIL NFR BLD AUTO: 2.8 % — SIGNIFICANT CHANGE UP (ref 0–6)
GLUCOSE SERPL-MCNC: 91 MG/DL — SIGNIFICANT CHANGE UP (ref 70–99)
HCT VFR BLD CALC: 41.4 % — SIGNIFICANT CHANGE UP (ref 34.5–45)
HGB BLD-MCNC: 13.2 G/DL — SIGNIFICANT CHANGE UP (ref 11.5–15.5)
IMM GRANULOCYTES NFR BLD AUTO: 0.1 % — SIGNIFICANT CHANGE UP (ref 0–1.5)
LYMPHOCYTES # BLD AUTO: 3.14 K/UL — SIGNIFICANT CHANGE UP (ref 1–3.3)
LYMPHOCYTES # BLD AUTO: 45.6 % — HIGH (ref 13–44)
MAGNESIUM SERPL-MCNC: 2 MG/DL — SIGNIFICANT CHANGE UP (ref 1.6–2.6)
MCHC RBC-ENTMCNC: 31.9 % — LOW (ref 32–36)
MCHC RBC-ENTMCNC: 32 PG — SIGNIFICANT CHANGE UP (ref 27–34)
MCV RBC AUTO: 100.2 FL — HIGH (ref 80–100)
MONOCYTES # BLD AUTO: 0.64 K/UL — SIGNIFICANT CHANGE UP (ref 0–0.9)
MONOCYTES NFR BLD AUTO: 9.3 % — SIGNIFICANT CHANGE UP (ref 2–14)
NEUTROPHILS # BLD AUTO: 2.86 K/UL — SIGNIFICANT CHANGE UP (ref 1.8–7.4)
NEUTROPHILS NFR BLD AUTO: 41.6 % — LOW (ref 43–77)
NRBC # FLD: 0.03 K/UL — SIGNIFICANT CHANGE UP (ref 0–0)
PLATELET # BLD AUTO: 82 K/UL — LOW (ref 150–400)
PMV BLD: 12 FL — SIGNIFICANT CHANGE UP (ref 7–13)
POTASSIUM SERPL-MCNC: 4.3 MMOL/L — SIGNIFICANT CHANGE UP (ref 3.5–5.3)
POTASSIUM SERPL-SCNC: 4.3 MMOL/L — SIGNIFICANT CHANGE UP (ref 3.5–5.3)
RBC # BLD: 4.13 M/UL — SIGNIFICANT CHANGE UP (ref 3.8–5.2)
RBC # FLD: 12.7 % — SIGNIFICANT CHANGE UP (ref 10.3–14.5)
SODIUM SERPL-SCNC: 142 MMOL/L — SIGNIFICANT CHANGE UP (ref 135–145)
WBC # BLD: 6.88 K/UL — SIGNIFICANT CHANGE UP (ref 3.8–10.5)
WBC # FLD AUTO: 6.88 K/UL — SIGNIFICANT CHANGE UP (ref 3.8–10.5)

## 2019-09-10 PROCEDURE — 99232 SBSQ HOSP IP/OBS MODERATE 35: CPT

## 2019-09-10 RX ORDER — METOPROLOL TARTRATE 50 MG
1 TABLET ORAL
Qty: 0 | Refills: 0 | DISCHARGE

## 2019-09-10 RX ORDER — TICAGRELOR 90 MG/1
1 TABLET ORAL
Qty: 0 | Refills: 0 | DISCHARGE

## 2019-09-10 RX ORDER — CLOPIDOGREL BISULFATE 75 MG/1
1 TABLET, FILM COATED ORAL
Qty: 30 | Refills: 0
Start: 2019-09-10 | End: 2019-10-09

## 2019-09-10 RX ADMIN — Medication 81 MILLIGRAM(S): at 13:56

## 2019-09-10 RX ADMIN — SODIUM CHLORIDE 1000 MILLILITER(S): 9 INJECTION INTRAMUSCULAR; INTRAVENOUS; SUBCUTANEOUS at 00:11

## 2019-09-10 RX ADMIN — Medication 0.5 MILLIGRAM(S): at 13:56

## 2019-09-10 RX ADMIN — Medication 0.5 MILLIGRAM(S): at 06:06

## 2019-09-10 RX ADMIN — CLOPIDOGREL BISULFATE 75 MILLIGRAM(S): 75 TABLET, FILM COATED ORAL at 13:56

## 2019-09-10 NOTE — DISCHARGE NOTE NURSING/CASE MANAGEMENT/SOCIAL WORK - PATIENT PORTAL LINK FT
You can access the FollowMyHealth Patient Portal offered by [facility] by registering at the following website: [email or web address]. By joining [employer]’s FollowMyHealth portal, you will also be able to view your health information using other applications (apps) compatible with our system.

## 2019-09-10 NOTE — CONSULT NOTE ADULT - ASSESSMENT
58 year old female with a PMH of CAD, stents x4, HLD, anxiety, PTSD, bipolar, HTN, hx of perforated ulcer in 1993, lumpectomy, herniated disc presented with chest tightness.  She was ruled out ACS with [facility] yesterday.  Post catheterization procedure patient had two episodes of symptomatic sinus tk down to 38-40's, likely secondary to beta-blocker and Clonidine use.  P-P prolongation on telemetry suggests high vagal tone during the tk episodes.  HR response during ambulation demonstrated chronotropic competence.  Her echo showed normal LVEF.      -No pacing indication   -Discontinue beta-blocker given her mild SND on baseline EKG  -Consider taper off Clonidine from BID to daily   -Discussed with Dr. George

## 2019-09-10 NOTE — DISCHARGE NOTE NURSING/CASE MANAGEMENT/SOCIAL WORK - NSDCPEWEB_GEN_ALL_CORE
[place] website --- [email or web address] for Tobacco Control website --- [email or web address]

## 2019-09-10 NOTE — CHART NOTE - NSCHARTNOTEFT_GEN_A_CORE
notified by Rn that patient noted to be Bradycardic to the 30's. Pt SBP 80's. Pt axs denies CP, dizziness, HA, or weakness. Pt is S/p Cath patent LAD stent, mRCA 30%, was noted to have worsening bradycardial post cardiac cath. Bradycardia believed to be Medication induced patient received Diazepam 5 mg PO x 1 prior to cath, Fentanyl in cath lab with HR 37-45 and dizziness. Pt received Atropine 0.5 mg IV x 1 given with improvement in symptoms and heart rate (53-55). EP has been made aware. Will order NS bolus for now, repeat BP in 1hr and will continue to monitor on telemetry. Will consider atropine .5 if pt becomes symptomatic.

## 2019-09-10 NOTE — DISCHARGE NOTE PROVIDER - CARE PROVIDER_API CALL
Eligio Green)  Cardiology  [address] [id number]  Phone: [phone]  Fax: [phone]  Follow Up Time:     ralph,   follow up with dr rosas  Phone: (   )    -  Fax: (   )    -  Follow Up Time:

## 2019-09-10 NOTE — DISCHARGE NOTE PROVIDER - NSDCCPCAREPLAN_GEN_ALL_CORE_FT
PRINCIPAL DISCHARGE DIAGNOSIS  Diagnosis: Chest pain  Assessment and Plan of Treatment: follow up with your cardiologist call for appointment      SECONDARY DISCHARGE DIAGNOSES  Diagnosis: Hyperlipidemia  Assessment and Plan of Treatment: follow up with your primary care doctor continue taking your cholesterol medication    Diagnosis: Secondary hypertension  Assessment and Plan of Treatment: follow up with your primary care doctor continue taking your blood pressure medication. stop taking your klonopin and lopressor until you follow up with your doctor    Diagnosis: Bipolar 1 disorder  Assessment and Plan of Treatment: follow up with your primary care doctor continue taking your  medication    Diagnosis: CAD (coronary artery disease)  Assessment and Plan of Treatment: follow up with cardiology this week call for appointment.  continue taking your aspirin.  take plavix instead of brillinta PRINCIPAL DISCHARGE DIAGNOSIS  Diagnosis: Chest pain  Assessment and Plan of Treatment: follow up with your cardiologist call for appointment      SECONDARY DISCHARGE DIAGNOSES  Diagnosis: Hyperlipidemia  Assessment and Plan of Treatment: follow up with your primary care doctor continue taking your cholesterol medication    Diagnosis: Secondary hypertension  Assessment and Plan of Treatment: follow up with your primary care doctor continue taking your blood pressure medication. stop taking your clonodine  and lopressor until you follow up with your doctor    Diagnosis: Bipolar 1 disorder  Assessment and Plan of Treatment: follow up with your primary care doctor continue taking your  medication    Diagnosis: CAD (coronary artery disease)  Assessment and Plan of Treatment: follow up with cardiology this week call for appointment.  continue taking your aspirin.  take plavix instead of brillinta PRINCIPAL DISCHARGE DIAGNOSIS  Diagnosis: Chest pain  Assessment and Plan of Treatment: follow up with your cardiologist call for appointment      SECONDARY DISCHARGE DIAGNOSES  Diagnosis: Hyperlipidemia  Assessment and Plan of Treatment: follow up with your primary care doctor continue taking your cholesterol medication    Diagnosis: Secondary hypertension  Assessment and Plan of Treatment: follow up with your primary care doctor continue taking your blood pressure medication. please take clonodine once a day  and  stop taking your lopressor until you follow up with your doctor.  continue taking lisinopril and aspirin    Diagnosis: Bipolar 1 disorder  Assessment and Plan of Treatment: follow up with your primary care doctor continue taking your  medication    Diagnosis: CAD (coronary artery disease)  Assessment and Plan of Treatment: follow up with cardiology this week call for appointment.  continue taking your aspirin.  take plavix instead of brillinta

## 2019-09-10 NOTE — CONSULT NOTE ADULT - SUBJECTIVE AND OBJECTIVE BOX
Patient is a 58y old  Female who presents with a chief complaint of Chest Pain (10 Sep 2019 12:19)          HPI:  58 year old female with a PMH of CAD, stents x4, HLD, anxiety, PTSD, bipolar, HTN, hx of perforated ulcer in , lumpectomy, herniated disc presented with chest tightness.  She was ruled out ACS with [facility] yesterday.  Post catheterization procedure patient developed one episode of Sinus tk down to 38-low 40's bpm with dizziness required Atropine.  Around midnight she was noted tk down to 38-low 40's with SBP ~90, she reports she felt lightheadedness when she got out of bed at that time.  Her beta-blocker Metoprolol was held.  Of note patient was on Clonidine 0.1mg BID for BP control.  She denies near syncope/syncope or dizziness.  Her HR was noted up to 92 bpm with few minutes ambulation.  HR trend in 50's bpm during the wake hours.    Echo showed normal LVEF.        PAST MEDICAL & SURGICAL HISTORY:  Hyperlipidemia  CAD (coronary artery disease)  Anxiety  PTSD (post-traumatic stress disorder)  Secondary hypertension  Heart disease  Bipolar 1 disorder  H/O angioplasty: stents placed in  &amp; 2019  History of bilateral breast reduction surgery  History of ovarian cyst  Perforated ulcer  History of lumpectomy  History of appendectomy      MEDICATIONS  (STANDING):  aspirin enteric coated 81 milliGRAM(s) Oral daily  atorvastatin 40 milliGRAM(s) Oral at bedtime  clonazePAM  Tablet 0.5 milliGRAM(s) Oral three times a day  clopidogrel Tablet 75 milliGRAM(s) Oral daily  influenza   Vaccine 0.5 milliLiter(s) IntraMuscular once  lisinopril 5 milliGRAM(s) Oral daily    MEDICATIONS  (PRN):  acetaminophen   Tablet .. 650 milliGRAM(s) Oral every 6 hours PRN Temp greater or equal to 38C (100.4F), Mild Pain (1 - 3), Moderate Pain (4 - 6)    Allergies    morphine (Nausea)  morphine (Vomiting)  Nuts (Unknown)  peanuts (Hives)  peanuts (Rash)    Intolerances      FAMILY HISTORY:  FH: lung cancer: Father  of Lung Cancer @ age 55; Brother currently has lung cancer  Family history of breast cancer in mother: Mother  at age 55 of breast cancer  FHx: diabetes mellitus: sister      SOCIAL HISTORY:  +ex- smoking; no   Alcohol  or  Drug abuse               REVIEW OF SYSTEMS:    CONSTITUTIONAL: No fever, weight loss, chills, shakes, or fatigue  EYES: No eye pain, visual disturbances, or discharge  ENMT:  No difficulty hearing, tinnitus, vertigo; No sinus or throat pain  NECK: No pain or stiffness  RESPIRATORY: No cough, wheezing, hemoptysis, or shortness of breath  CARDIOVASCULAR: No  dyspnea, palpitations, dizziness, syncope, paroxysmal nocturnal dyspnea, orthopnea, or arm or leg swelling+ chest pain  GASTROINTESTINAL: No abdominal  or epigastric pain, nausea, vomiting, hematemesis, diarrhea, constipation, melena or bright red blood.  GENITOURINARY: No dysuria, nocturia, hematuria, or urinary incontinence  NEUROLOGICAL: No headaches, memory loss, slurred speech, limb weakness, loss of strength, numbness, or tremors  SKIN: No itching, burning, rashes, or lesions   LYMPH NODES: No enlarged glands  ENDOCRINE: No heat or cold intolerance, or hair loss  MUSCULOSKELETAL: No joint pain or swelling, muscle, back, or extremity pain  PSYCHIATRIC: No depression, anxiety, or difficulty sleeping  HEME/LYMPH: No easy bruising or bleeding gums  ALLERY AND IMMUNOLOGIC: No hives or rash.      Vital Signs Last 24 Hrs  T(C): 37 (10 Sep 2019 12:00), Max: 37 (09 Sep 2019 23:30)  T(F): 98.6 (10 Sep 2019 12:00), Max: 98.6 (09 Sep 2019 23:30)  HR: 66 (10 Sep 2019 12:00) (39 - 66)  BP: 108/79 (10 Sep 2019 12:00) (90/70 - 135/75)  BP(mean): --  RR: 16 (10 Sep 2019 12:00) (16 - 16)  SpO2: 100% (10 Sep 2019 12:00) (100% - 100%)    PHYSICAL EXAM:    GENERAL: In no apparent distress, well nourished, and hydrated.  HEAD:  Atraumatic, Normocephalic  NECK: Supple . No JVD or carotid bruit or thyroidmegaly.  Carotid pulse is 2+ bilaterally.  PULMONARY: Clear to auscultation and perfusion.  No rales, wheezing, or rhonchi bilaterally.  HEART: S1S2 tk; No murmurs, rubs, or gallops.  ABDOMEN: Soft, Nontender, Nondistended; Bowel sounds present  EXTREMITIES: No clubbing, cyanosis, or edema  NEUROLOGICAL: Alert oriented to person, place and time.  Speech clear.  Skin: Dry intact, no rashes or lesions.          INTERPRETATION OF TELEMETRY:  Sinus arrythmia with sinus kt in 50's, HR up to 90's with ambulation; tk down to 38 bpm during night    ECG: SB 52 bpm        LABS:                        13.2   6.88  )-----------( 82       ( 10 Sep 2019 06:00 )             41.4     09-10    142  |  108<H>  |  16  ----------------------------<  91  4.3   |  23  |  1.05    Ca    9.2      10 Sep 2019 06:00  Mg     2.0     09-10    Thyroid Stimulating Hormone, Serum in AM (19 @ 06:18)    Thyroid Stimulating Hormone, Serum: 2.87 uIU/mL                BNP  RADIOLOGY & ADDITIONAL STUDIES:  PREVIOUS DIAGNOSTIC TESTING:      ECHO FINDINGS:CONCLUSIONS:  1. Normal left ventricular internal dimensions and wall  thicknesses.  2. Hyperdynamic left ventricle.  3. Normal left ventricular diastolic function.  STRESS FINDINGS:    CATHETERIZATION FINDINGS:

## 2019-09-10 NOTE — DISCHARGE NOTE PROVIDER - HOSPITAL COURSE
59 y/o female, with a PmHx of HTN, HLD, CAD w/ stents, Anxiety, Bipolar, Chronic back pain from 4 herniated discs, PTSD, presented to the [facility] ED with chest pain. Admitted to telemetry for r/o acs.        Chest pain.      EKG/Telemetry, ce x 2, mg, tsh, echo,     Non compliant with Brilinta 2/2 dyspnea will change to Clopidogrel    cxr     Clear lungs. No acute osseous pathology.        echo     ef 65-75%    CONCLUSIONS:    1. Normal left ventricular internal dimensions and wall    thicknesses.    2. Hyperdynamic left ventricle.    3. Normal left ventricular diastolic function.    4. Normal right ventricular size and function.    5. Estimated right ventricular systolic pressure equals 28    mm Hg, assuming right atrial pressure equals 10 mm Hg,    consistent with normal pulmonary pressures.        s/p cardiac cath, patent LAD stent, mRCA 30%,    post op course complicated by bradycardia received atropin bp meds helpd    overnight bradycardia and hypotension responded to 1 liter bolus             Secondary hypertension.      monitor bp, cont meds, adjust as needed.         Bipolar 1 disorder.      continue home medications.         Hyperlipidemia.      continue  statin. 57 y/o female, with a PmHx of HTN, HLD, CAD w/ stents, Anxiety, Bipolar, Chronic back pain from 4 herniated discs, PTSD, presented to the [facility] ED with chest pain. Admitted to telemetry for r/o acs.        Chest pain.      EKG/Telemetry, ce x 2, mg, tsh, echo,     Non compliant with Brilinta 2/2 dyspnea will change to Clopidogrel    cxr     Clear lungs. No acute osseous pathology.        echo     ef 65-75%    CONCLUSIONS:    1. Normal left ventricular internal dimensions and wall    thicknesses.    2. Hyperdynamic left ventricle.    3. Normal left ventricular diastolic function.    4. Normal right ventricular size and function.    5. Estimated right ventricular systolic pressure equals 28    mm Hg, assuming right atrial pressure equals 10 mm Hg,    consistent with normal pulmonary pressures.        s/p cardiac cath, patent LAD stent, mRCA 30%,    post op course complicated by bradycardia received atropin bp meds held    overnight bradycardia and hypotension responded to 1 liter bolus             Secondary hypertension.      monitor bp, cont meds, adjust as needed.         Bipolar 1 disorder.      continue home medications.         Hyperlipidemia.      continue  statin.

## 2019-09-10 NOTE — CHART NOTE - NSCHARTNOTEFT_GEN_A_CORE
discussed with ep and dr orlando lowery with plan and discharge   d/c lopressor  continue lisinopril and aspirin  decrease clonopin to once a day   start plavix and d/c brillinta

## 2019-09-10 NOTE — DISCHARGE NOTE NURSING/CASE MANAGEMENT/SOCIAL WORK - NSDCPEEMAIL_GEN_ALL_CORE
[facility] for Tobacco Control email [email or web address]

## 2019-09-10 NOTE — DISCHARGE NOTE PROVIDER - PROVIDER TOKENS
PROVIDER:[TOKEN:[[id number]]],FREE:[LAST:[ralph],PHONE:[(   )    -],FAX:[(   )    -],ADDRESS:[follow up with dr rosas]]
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
## 2019-12-01 PROCEDURE — G9005: CPT

## 2020-02-01 ENCOUNTER — OUTPATIENT (OUTPATIENT)
Dept: OUTPATIENT SERVICES | Facility: HOSPITAL | Age: 60
LOS: 1 days | End: 2020-02-01
Payer: MEDICAID

## 2020-02-01 DIAGNOSIS — Z98.890 OTHER SPECIFIED POSTPROCEDURAL STATES: Chronic | ICD-10-CM

## 2020-02-01 DIAGNOSIS — Z90.49 ACQUIRED ABSENCE OF OTHER SPECIFIED PARTS OF DIGESTIVE TRACT: Chronic | ICD-10-CM

## 2020-02-01 DIAGNOSIS — Z87.42 PERSONAL HISTORY OF OTHER DISEASES OF THE FEMALE GENITAL TRACT: Chronic | ICD-10-CM

## 2020-02-01 DIAGNOSIS — K27.5 CHRONIC OR UNSPECIFIED PEPTIC ULCER, SITE UNSPECIFIED, WITH PERFORATION: Chronic | ICD-10-CM

## 2020-02-01 DIAGNOSIS — Z98.62 PERIPHERAL VASCULAR ANGIOPLASTY STATUS: Chronic | ICD-10-CM

## 2020-02-19 DIAGNOSIS — Z71.89 OTHER SPECIFIED COUNSELING: ICD-10-CM

## 2020-04-01 ENCOUNTER — OUTPATIENT (OUTPATIENT)
Dept: OUTPATIENT SERVICES | Facility: HOSPITAL | Age: 60
LOS: 1 days | End: 2020-04-01
Payer: MEDICAID

## 2020-04-01 DIAGNOSIS — Z98.890 OTHER SPECIFIED POSTPROCEDURAL STATES: Chronic | ICD-10-CM

## 2020-04-01 DIAGNOSIS — Z98.62 PERIPHERAL VASCULAR ANGIOPLASTY STATUS: Chronic | ICD-10-CM

## 2020-04-01 DIAGNOSIS — K27.5 CHRONIC OR UNSPECIFIED PEPTIC ULCER, SITE UNSPECIFIED, WITH PERFORATION: Chronic | ICD-10-CM

## 2020-04-01 DIAGNOSIS — Z90.49 ACQUIRED ABSENCE OF OTHER SPECIFIED PARTS OF DIGESTIVE TRACT: Chronic | ICD-10-CM

## 2020-04-01 DIAGNOSIS — Z87.42 PERSONAL HISTORY OF OTHER DISEASES OF THE FEMALE GENITAL TRACT: Chronic | ICD-10-CM

## 2020-04-01 PROCEDURE — H0002: CPT

## 2020-08-03 DIAGNOSIS — Z71.89 OTHER SPECIFIED COUNSELING: ICD-10-CM

## 2020-12-28 ENCOUNTER — INPATIENT (INPATIENT)
Facility: HOSPITAL | Age: 60
LOS: 2 days | Discharge: HOME | DRG: 198 | End: 2020-12-31
Attending: STUDENT IN AN ORGANIZED HEALTH CARE EDUCATION/TRAINING PROGRAM | Admitting: HOSPITALIST
Payer: MEDICAID

## 2020-12-28 VITALS
RESPIRATION RATE: 15 BRPM | SYSTOLIC BLOOD PRESSURE: 127 MMHG | DIASTOLIC BLOOD PRESSURE: 80 MMHG | WEIGHT: 175.05 LBS | OXYGEN SATURATION: 100 % | TEMPERATURE: 98 F | HEIGHT: 65 IN | HEART RATE: 80 BPM

## 2020-12-28 DIAGNOSIS — Z98.890 OTHER SPECIFIED POSTPROCEDURAL STATES: Chronic | ICD-10-CM

## 2020-12-28 DIAGNOSIS — Z98.62 PERIPHERAL VASCULAR ANGIOPLASTY STATUS: Chronic | ICD-10-CM

## 2020-12-28 DIAGNOSIS — Z90.49 ACQUIRED ABSENCE OF OTHER SPECIFIED PARTS OF DIGESTIVE TRACT: Chronic | ICD-10-CM

## 2020-12-28 DIAGNOSIS — F13.90 SEDATIVE, HYPNOTIC, OR ANXIOLYTIC USE, UNSPECIFIED, UNCOMPLICATED: ICD-10-CM

## 2020-12-28 DIAGNOSIS — F39 UNSPECIFIED MOOD [AFFECTIVE] DISORDER: ICD-10-CM

## 2020-12-28 DIAGNOSIS — R07.9 CHEST PAIN, UNSPECIFIED: ICD-10-CM

## 2020-12-28 DIAGNOSIS — K27.5 CHRONIC OR UNSPECIFIED PEPTIC ULCER, SITE UNSPECIFIED, WITH PERFORATION: Chronic | ICD-10-CM

## 2020-12-28 DIAGNOSIS — Z87.42 PERSONAL HISTORY OF OTHER DISEASES OF THE FEMALE GENITAL TRACT: Chronic | ICD-10-CM

## 2020-12-28 LAB
ALBUMIN SERPL ELPH-MCNC: 3.4 G/DL — SIGNIFICANT CHANGE UP (ref 3.3–5)
ALP SERPL-CCNC: 65 U/L — SIGNIFICANT CHANGE UP (ref 40–120)
ALT FLD-CCNC: 21 U/L — SIGNIFICANT CHANGE UP (ref 12–78)
ANION GAP SERPL CALC-SCNC: 5 MMOL/L — SIGNIFICANT CHANGE UP (ref 5–17)
APTT BLD: 29.1 SEC — SIGNIFICANT CHANGE UP (ref 27.5–35.5)
AST SERPL-CCNC: 15 U/L — SIGNIFICANT CHANGE UP (ref 15–37)
BASOPHILS # BLD AUTO: 0.02 K/UL — SIGNIFICANT CHANGE UP (ref 0–0.2)
BASOPHILS NFR BLD AUTO: 0.3 % — SIGNIFICANT CHANGE UP (ref 0–2)
BILIRUB SERPL-MCNC: 0.5 MG/DL — SIGNIFICANT CHANGE UP (ref 0.2–1.2)
BUN SERPL-MCNC: 17 MG/DL — SIGNIFICANT CHANGE UP (ref 7–23)
CALCIUM SERPL-MCNC: 9.1 MG/DL — SIGNIFICANT CHANGE UP (ref 8.5–10.1)
CHLORIDE SERPL-SCNC: 112 MMOL/L — HIGH (ref 96–108)
CK SERPL-CCNC: 95 U/L — SIGNIFICANT CHANGE UP (ref 26–192)
CO2 SERPL-SCNC: 24 MMOL/L — SIGNIFICANT CHANGE UP (ref 22–31)
CREAT SERPL-MCNC: 0.9 MG/DL — SIGNIFICANT CHANGE UP (ref 0.5–1.3)
EOSINOPHIL # BLD AUTO: 0.07 K/UL — SIGNIFICANT CHANGE UP (ref 0–0.5)
EOSINOPHIL NFR BLD AUTO: 1.1 % — SIGNIFICANT CHANGE UP (ref 0–6)
GLUCOSE SERPL-MCNC: 92 MG/DL — SIGNIFICANT CHANGE UP (ref 70–99)
HCT VFR BLD CALC: 38.1 % — SIGNIFICANT CHANGE UP (ref 34.5–45)
HGB BLD-MCNC: 12.8 G/DL — SIGNIFICANT CHANGE UP (ref 11.5–15.5)
IMM GRANULOCYTES NFR BLD AUTO: 0.6 % — SIGNIFICANT CHANGE UP (ref 0–1.5)
INR BLD: 0.99 RATIO — SIGNIFICANT CHANGE UP (ref 0.88–1.16)
LIDOCAIN IGE QN: 62 U/L — LOW (ref 73–393)
LYMPHOCYTES # BLD AUTO: 2.59 K/UL — SIGNIFICANT CHANGE UP (ref 1–3.3)
LYMPHOCYTES # BLD AUTO: 39.9 % — SIGNIFICANT CHANGE UP (ref 13–44)
MAGNESIUM SERPL-MCNC: 2.1 MG/DL — SIGNIFICANT CHANGE UP (ref 1.6–2.6)
MCHC RBC-ENTMCNC: 33.6 GM/DL — SIGNIFICANT CHANGE UP (ref 32–36)
MCHC RBC-ENTMCNC: 34 PG — SIGNIFICANT CHANGE UP (ref 27–34)
MCV RBC AUTO: 101.1 FL — HIGH (ref 80–100)
MONOCYTES # BLD AUTO: 0.68 K/UL — SIGNIFICANT CHANGE UP (ref 0–0.9)
MONOCYTES NFR BLD AUTO: 10.5 % — SIGNIFICANT CHANGE UP (ref 2–14)
NEUTROPHILS # BLD AUTO: 3.09 K/UL — SIGNIFICANT CHANGE UP (ref 1.8–7.4)
NEUTROPHILS NFR BLD AUTO: 47.6 % — SIGNIFICANT CHANGE UP (ref 43–77)
NT-PROBNP SERPL-SCNC: 68 PG/ML — SIGNIFICANT CHANGE UP (ref 0–125)
PLATELET # BLD AUTO: 109 K/UL — LOW (ref 150–400)
POTASSIUM SERPL-MCNC: 3.8 MMOL/L — SIGNIFICANT CHANGE UP (ref 3.5–5.3)
POTASSIUM SERPL-SCNC: 3.8 MMOL/L — SIGNIFICANT CHANGE UP (ref 3.5–5.3)
PROT SERPL-MCNC: 6.8 GM/DL — SIGNIFICANT CHANGE UP (ref 6–8.3)
PROTHROM AB SERPL-ACNC: 11.5 SEC — SIGNIFICANT CHANGE UP (ref 10.6–13.6)
RBC # BLD: 3.77 M/UL — LOW (ref 3.8–5.2)
RBC # FLD: 13 % — SIGNIFICANT CHANGE UP (ref 10.3–14.5)
SARS-COV-2 RNA SPEC QL NAA+PROBE: SIGNIFICANT CHANGE UP
SODIUM SERPL-SCNC: 141 MMOL/L — SIGNIFICANT CHANGE UP (ref 135–145)
TROPONIN I SERPL-MCNC: <0.015 NG/ML — SIGNIFICANT CHANGE UP (ref 0.01–0.04)
WBC # BLD: 6.49 K/UL — SIGNIFICANT CHANGE UP (ref 3.8–10.5)
WBC # FLD AUTO: 6.49 K/UL — SIGNIFICANT CHANGE UP (ref 3.8–10.5)

## 2020-12-28 PROCEDURE — 85027 COMPLETE CBC AUTOMATED: CPT

## 2020-12-28 PROCEDURE — 93306 TTE W/DOPPLER COMPLETE: CPT

## 2020-12-28 PROCEDURE — 80307 DRUG TEST PRSMV CHEM ANLYZR: CPT

## 2020-12-28 PROCEDURE — A9500: CPT

## 2020-12-28 PROCEDURE — 71045 X-RAY EXAM CHEST 1 VIEW: CPT | Mod: 26

## 2020-12-28 PROCEDURE — 82550 ASSAY OF CK (CPK): CPT

## 2020-12-28 PROCEDURE — 93010 ELECTROCARDIOGRAM REPORT: CPT

## 2020-12-28 PROCEDURE — 80048 BASIC METABOLIC PNL TOTAL CA: CPT

## 2020-12-28 PROCEDURE — 78452 HT MUSCLE IMAGE SPECT MULT: CPT

## 2020-12-28 PROCEDURE — 86769 SARS-COV-2 COVID-19 ANTIBODY: CPT

## 2020-12-28 PROCEDURE — 93005 ELECTROCARDIOGRAM TRACING: CPT

## 2020-12-28 PROCEDURE — 84484 ASSAY OF TROPONIN QUANT: CPT

## 2020-12-28 PROCEDURE — 99221 1ST HOSP IP/OBS SF/LOW 40: CPT | Mod: AI

## 2020-12-28 PROCEDURE — 36415 COLL VENOUS BLD VENIPUNCTURE: CPT

## 2020-12-28 PROCEDURE — 93017 CV STRESS TEST TRACING ONLY: CPT

## 2020-12-28 RX ORDER — ASPIRIN/CALCIUM CARB/MAGNESIUM 324 MG
1 TABLET ORAL
Qty: 0 | Refills: 0 | DISCHARGE

## 2020-12-28 RX ORDER — TRAZODONE HCL 50 MG
50 TABLET ORAL AT BEDTIME
Refills: 0 | Status: DISCONTINUED | OUTPATIENT
Start: 2020-12-28 | End: 2020-12-31

## 2020-12-28 RX ORDER — ATORVASTATIN CALCIUM 80 MG/1
1 TABLET, FILM COATED ORAL
Qty: 0 | Refills: 0 | DISCHARGE

## 2020-12-28 RX ORDER — HALOPERIDOL DECANOATE 100 MG/ML
2.5 INJECTION INTRAMUSCULAR EVERY 6 HOURS
Refills: 0 | Status: DISCONTINUED | OUTPATIENT
Start: 2020-12-28 | End: 2020-12-31

## 2020-12-28 RX ORDER — HEPARIN SODIUM 5000 [USP'U]/ML
5000 INJECTION INTRAVENOUS; SUBCUTANEOUS EVERY 12 HOURS
Refills: 0 | Status: DISCONTINUED | OUTPATIENT
Start: 2020-12-28 | End: 2020-12-31

## 2020-12-28 RX ORDER — ACETAMINOPHEN 500 MG
650 TABLET ORAL EVERY 6 HOURS
Refills: 0 | Status: DISCONTINUED | OUTPATIENT
Start: 2020-12-28 | End: 2020-12-31

## 2020-12-28 RX ORDER — ASPIRIN/CALCIUM CARB/MAGNESIUM 324 MG
81 TABLET ORAL DAILY
Refills: 0 | Status: DISCONTINUED | OUTPATIENT
Start: 2020-12-28 | End: 2020-12-31

## 2020-12-28 RX ORDER — LISINOPRIL 2.5 MG/1
5 TABLET ORAL DAILY
Refills: 0 | Status: DISCONTINUED | OUTPATIENT
Start: 2020-12-28 | End: 2020-12-31

## 2020-12-28 RX ORDER — CLONAZEPAM 1 MG
0.5 TABLET ORAL THREE TIMES A DAY
Refills: 0 | Status: DISCONTINUED | OUTPATIENT
Start: 2020-12-28 | End: 2020-12-31

## 2020-12-28 RX ORDER — ASPIRIN/CALCIUM CARB/MAGNESIUM 324 MG
325 TABLET ORAL ONCE
Refills: 0 | Status: COMPLETED | OUTPATIENT
Start: 2020-12-28 | End: 2020-12-28

## 2020-12-28 RX ADMIN — Medication 325 MILLIGRAM(S): at 16:10

## 2020-12-28 RX ADMIN — Medication 0.5 MILLIGRAM(S): at 23:01

## 2020-12-28 NOTE — ED ADULT NURSE REASSESSMENT NOTE - NS ED NURSE REASSESS COMMENT FT1
Patient received from Jona CARTER. Resting in bed upon assessment. Denies chest pain/SOB. food provided.

## 2020-12-28 NOTE — H&P ADULT - ASSESSMENT
59yr old female PMH HTN, HLD, CAD, stents, anxiety, bipolar, chronic back pain, herniated discs BIBEMS p/w CP for 8 hours. Pt. states that chest pain was accompanied with diaphoresis mild nausea and  SOB. Pt. reports that pain is similar to prior episode of CP in 2019 which required stents. Pt states stressors involving multiple relocations over the past few months.  Pt also with grandiose delusions that she inherited a million dollars and owns her own estate, and that people are after her.
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
## 2020-12-28 NOTE — ED PROVIDER NOTE - CARE PLAN
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

## 2020-12-28 NOTE — ED BEHAVIORAL HEALTH ASSESSMENT NOTE - OTHER
[id number] single room occupancy hotel room looks much older than stated age; swollen eyes/looks exhausted limited low end of fair not assessed as pt was in bed during interview via telepsych CVM no assessed - telepsych eval

## 2020-12-28 NOTE — H&P ADULT - ATTENDING COMMENTS
Patient with prior history of coronary stents.  She presents with atypical chest pain, no ST changes on EKG and troponins negative so far.  Most likely low risk and can be discharged for outpatient follow up pending troponins and lack of recurrent symptoms.

## 2020-12-28 NOTE — ED BEHAVIORAL HEALTH ASSESSMENT NOTE - RISK ASSESSMENT
Low Acute Suicide Risk Chronic risk factors: single, ongoing substance use; estranged from family and most friend; Axis II pathology; hx of belligerence; chronic unstable domicile; hx of suicide attempt. Protective factors: age < 65 yrs; medication and treatment compliant in general; access to health services.     Acute risk factors: unstable living situation, medical comorbidities

## 2020-12-28 NOTE — ED ADULT NURSE NOTE - OBJECTIVE STATEMENT
pt presents to ed via ems from home for evaluation of chest pain x 8 hours PTA. pt has hx of stents. pt reports multiple stressors. pt a&ox4, zamudio x 4, vitals stable, in no distress, no other complaints  . ekg done, placed on monitor
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
## 2020-12-28 NOTE — H&P ADULT - NSHPREVIEWOFSYSTEMS_GEN_ALL_CORE
CONSTITUTIONAL: No fever, weight loss, or fatigue  EYES: No eye pain, visual disturbances, or discharge  ENMT:  No difficulty hearing, tinnitus, vertigo; No sinus or throat pain  NECK: No pain or stiffness  BREASTS: No pain, masses, or nipple discharge  RESPIRATORY: + shortness of breath  CARDIOVASCULAR: +chest pain, + diaphoresis   GASTROINTESTINAL: No abdominal or epigastric pain. No nausea, vomiting, or hematemesis; No diarrhea or constipation. No melena or hematochezia.  GENITOURINARY: No dysuria, frequency, hematuria, or incontinence  NEUROLOGICAL: No headaches, memory loss, loss of strength, numbness, or tremors  SKIN: No itching, burning, rashes, or lesions   ENDOCRINE: No heat or cold intolerance; No hair loss  MUSCULOSKELETAL: No joint pain or swelling; No muscle, back, or extremity pain  PSYCHIATRIC: No depression, anxiety, mood swings, or difficulty sleeping

## 2020-12-28 NOTE — ED PROVIDER NOTE - OBJECTIVE STATEMENT
Addended by: ZAHEER LAMB on: 11/12/2018 11:01 AM     Modules accepted: Orders     Pertinent HPI/PMH/PSH/FHx/SHx and Review of Systems contained within  HPI:  Patient BIBEMS p/w CP since 8 hours PTA. Pt states pain similar to prior episode of CP in 2019 which required stents. Pt reports stressors involving multiple relocations over the past few months. Pt also reports more abnormal diaphoresis with associated SOB and mild nausea over the past few days. PCP: Brina. Pt currently does not follow with a Cardiologist, states last CARDIOLOGIST: Brina.  PMH/PSH relevant for: HTN, HLD, CAD s/p stents in 2019, Anxiety, Bipolar, Chronic back pain from 4 herniated discs, PTSD,  ROS negative for: fever, vomiting, diarrhea, abdominal pain, dysuria, calf tenderness, peripheral edema  FamilyHx and SocialHx not otherwise contributory Pertinent HPI/PMH/PSH/FHx/SHx and Review of Systems contained within  HPI:  Patient BIBEMS p/w CP since 8 hours PTA. Pt states pain similar to prior episode of CP in 2019 which required stents. Pt reports stressors involving multiple relocations over the past few months. Pt also reports more abnormal diaphoresis with associated SOB and mild nausea over the past few days. PCP: Brina. Pt currently does not follow with a Cardiologist, states last CARDIOLOGIST: Wesley  Pt also with grandiose delusions that she inherited a million dollars and owns her own estate, and that people are after her. Pt denies any SI or HI, or   PMH/PSH relevant for: HTN, HLD, CAD s/p stents in 2019, Anxiety, Bipolar, Chronic back pain from 4 herniated discs, PTSD,  ROS negative for: fever, vomiting, diarrhea, abdominal pain, dysuria, calf tenderness, peripheral edema  FamilyHx and SocialHx not otherwise contributory

## 2020-12-28 NOTE — PATIENT PROFILE ADULT - NSPRESCRALCSIXMORE_GEN_A_NUR
Airway patent, TM mild erythema bilaterally but no effusion/bulging +light reflex, normal appearing mouth, throat, neck supple with full range of motion, no cervical adenopathy. +Nasal congestion
Monthly

## 2020-12-28 NOTE — ED PROVIDER NOTE - CLINICAL SUMMARY MEDICAL DECISION MAKING FREE TEXT BOX
CP which worsens with stress. Pt also stating that it feels like prior times when pt required cardiac stents. CP is non-exertional, but associated with SOB and diaphoresis. Will get cardiology consult. Pt may also be malingering because it appears that pt is living at a motel and has no current place to stay. Pt also somewhat delusional that she inherited a million dollars and owns her own estate, and that people are after her. Pt denies any SI or HI, or hallucinations. CP which worsens with stress. Pt also stating that it feels like prior times when pt required cardiac stents. CP is non-exertional, but associated with SOB and diaphoresis. Will get cardiology consult. Pt may also be malingering because it appears that pt is living at a motel and has no current place to stay. Pt also somewhat delusional that she inherited a million dollars and owns her own estate, and that people are after her. Pt denies any SI or HI, or hallucinations. Will also get Psych consult. CP which worsens with stress. Pt also stating that it feels like prior times when pt required cardiac stents. CP is non-exertional, but associated with SOB and diaphoresis. Will get cardiology consult.   Pt also with grandiose delusions that she inherited a million dollars and owns her own estate, and that people are after her. Pt denies any SI or HI, or hallucinations. Will also get Psych consult.

## 2020-12-28 NOTE — ED PROVIDER NOTE - PMH
Anxiety    Bipolar 1 disorder    CAD (coronary artery disease)    Heart disease    Hyperlipidemia    PTSD (post-traumatic stress disorder)    Secondary hypertension

## 2020-12-28 NOTE — ED PROVIDER NOTE - PSH
H/O angioplasty  stents placed in 2007 & 5/2019  History of appendectomy    History of bilateral breast reduction surgery    History of lumpectomy    History of ovarian cyst    Perforated ulcer

## 2020-12-28 NOTE — ED PROVIDER NOTE - NS ED ROS FT
Review of Systems:  	•	CONSTITUTIONAL: no fever  	•	SKIN: no rash  	•	RESPIRATORY: no shortness of breath  	•	CARDIAC: +chest pain  	•	GI:  no abd pain, no nausea, no vomiting, no diarrhea  	•	GENITO-URINARY:  no dysuria  	•	MUSCULOSKELETAL:  no back pain  	•	NEUROLOGIC: no weakness  	•	ALLERGY: no rhinorrhea  	•	PSYSCHIATRIC: appropriate concern about symptoms

## 2020-12-28 NOTE — H&P ADULT - NSHPLABSRESULTS_GEN_ALL_CORE
12.8   6.49  )-----------( 109      ( 28 Dec 2020 13:23 )             38.1   12-28    141  |  112<H>  |  17  ----------------------------<  92  3.8   |  24  |  0.90    Ca    9.1      28 Dec 2020 13:23  Mg     2.1     12-28    TPro  6.8  /  Alb  3.4  /  TBili  0.5  /  DBili  x   /  AST  15  /  ALT  21  /  AlkPhos  65  12-28

## 2020-12-29 DIAGNOSIS — R07.9 CHEST PAIN, UNSPECIFIED: ICD-10-CM

## 2020-12-29 DIAGNOSIS — I15.9 SECONDARY HYPERTENSION, UNSPECIFIED: ICD-10-CM

## 2020-12-29 DIAGNOSIS — I25.10 ATHEROSCLEROTIC HEART DISEASE OF NATIVE CORONARY ARTERY WITHOUT ANGINA PECTORIS: ICD-10-CM

## 2020-12-29 DIAGNOSIS — F41.9 ANXIETY DISORDER, UNSPECIFIED: ICD-10-CM

## 2020-12-29 DIAGNOSIS — E78.5 HYPERLIPIDEMIA, UNSPECIFIED: ICD-10-CM

## 2020-12-29 DIAGNOSIS — I10 ESSENTIAL (PRIMARY) HYPERTENSION: ICD-10-CM

## 2020-12-29 LAB
ANION GAP SERPL CALC-SCNC: 5 MMOL/L — SIGNIFICANT CHANGE UP (ref 5–17)
BUN SERPL-MCNC: 27 MG/DL — HIGH (ref 7–23)
CALCIUM SERPL-MCNC: 9 MG/DL — SIGNIFICANT CHANGE UP (ref 8.5–10.1)
CHLORIDE SERPL-SCNC: 113 MMOL/L — HIGH (ref 96–108)
CK SERPL-CCNC: 72 U/L — SIGNIFICANT CHANGE UP (ref 26–192)
CO2 SERPL-SCNC: 26 MMOL/L — SIGNIFICANT CHANGE UP (ref 22–31)
CREAT SERPL-MCNC: 0.86 MG/DL — SIGNIFICANT CHANGE UP (ref 0.5–1.3)
GLUCOSE SERPL-MCNC: 101 MG/DL — HIGH (ref 70–99)
HCT VFR BLD CALC: 40.8 % — SIGNIFICANT CHANGE UP (ref 34.5–45)
HGB BLD-MCNC: 13.2 G/DL — SIGNIFICANT CHANGE UP (ref 11.5–15.5)
MCHC RBC-ENTMCNC: 32.4 GM/DL — SIGNIFICANT CHANGE UP (ref 32–36)
MCHC RBC-ENTMCNC: 33.2 PG — SIGNIFICANT CHANGE UP (ref 27–34)
MCV RBC AUTO: 102.8 FL — HIGH (ref 80–100)
PCP SPEC-MCNC: SIGNIFICANT CHANGE UP
PLATELET # BLD AUTO: 108 K/UL — LOW (ref 150–400)
POTASSIUM SERPL-MCNC: 4.3 MMOL/L — SIGNIFICANT CHANGE UP (ref 3.5–5.3)
POTASSIUM SERPL-SCNC: 4.3 MMOL/L — SIGNIFICANT CHANGE UP (ref 3.5–5.3)
RBC # BLD: 3.97 M/UL — SIGNIFICANT CHANGE UP (ref 3.8–5.2)
RBC # FLD: 12.9 % — SIGNIFICANT CHANGE UP (ref 10.3–14.5)
SARS-COV-2 IGG SERPL QL IA: NEGATIVE — SIGNIFICANT CHANGE UP
SARS-COV-2 IGM SERPL IA-ACNC: 0.06 INDEX — SIGNIFICANT CHANGE UP
SODIUM SERPL-SCNC: 144 MMOL/L — SIGNIFICANT CHANGE UP (ref 135–145)
TROPONIN I SERPL-MCNC: <0.015 NG/ML — SIGNIFICANT CHANGE UP (ref 0.01–0.04)
WBC # BLD: 7.43 K/UL — SIGNIFICANT CHANGE UP (ref 3.8–10.5)
WBC # FLD AUTO: 7.43 K/UL — SIGNIFICANT CHANGE UP (ref 3.8–10.5)

## 2020-12-29 PROCEDURE — 93010 ELECTROCARDIOGRAM REPORT: CPT

## 2020-12-29 PROCEDURE — 93306 TTE W/DOPPLER COMPLETE: CPT | Mod: 26

## 2020-12-29 PROCEDURE — 99223 1ST HOSP IP/OBS HIGH 75: CPT

## 2020-12-29 RX ORDER — DOXEPIN HCL 100 MG
100 CAPSULE ORAL ONCE
Refills: 0 | Status: COMPLETED | OUTPATIENT
Start: 2020-12-29 | End: 2020-12-29

## 2020-12-29 RX ADMIN — LISINOPRIL 5 MILLIGRAM(S): 2.5 TABLET ORAL at 10:12

## 2020-12-29 RX ADMIN — Medication 0.5 MILLIGRAM(S): at 15:32

## 2020-12-29 RX ADMIN — Medication 81 MILLIGRAM(S): at 10:12

## 2020-12-29 RX ADMIN — Medication 0.5 MILLIGRAM(S): at 22:12

## 2020-12-29 RX ADMIN — Medication 0.5 MILLIGRAM(S): at 05:27

## 2020-12-29 NOTE — CONSULT NOTE ADULT - PROBLEM SELECTOR RECOMMENDATION 9
appears to be atypical , with significant cardiac history , negative troponin , non specific EKg changes , would recommened echo ,   chemical stress test to rule out significant ischermia ,  continue ecotrin statin ,

## 2020-12-29 NOTE — PROGRESS NOTE ADULT - ASSESSMENT
59yr old female PMH HTN, HLD, CAD, stents, anxiety, bipolar, chronic back pain, herniated discs BIBEMS p/w CP for 8 hours. Pt. states that chest pain was accompanied with diaphoresis mild nausea and SOB. Pt. reports that pain is similar to prior episode of CP in 2019 which required stents. Pt states stressors involving multiple relocations over the past few months.  Pt also with grandiose delusions that she inherited a million dollars and owns her own estate, and that people are after her.     # Chest pain/CAD (stents)  with moderate risk for cardiac etiology   appears to be atypical , with significant cardiac history  trops negative x 3    non specific EKG changes   cehck TTE    chemical nuclear stress test to rule out significant ischemia   continue ecotrin statin   npo p midnight for nuclear stress test   Cardiology consult appreciated    # HTN  - controlled  - cont. lisinopril     #Bipolar/anxiety  Psych consult noted  C/W Klonopin  C/W Trazadone   C/W Haldol.       ,

## 2020-12-29 NOTE — PROGRESS NOTE ADULT - SUBJECTIVE AND OBJECTIVE BOX
CHIEF COMPLAINT:  chest pain yesterday twice     HPI:  59yr old female PMH HTN, HLD, CAD, stents, anxiety, bipolar, chronic back pain, herniated discs BIBEMS p/w CP for 8 hours. Pt. states that chest pain was accompanied with diaphoresis mild nausea and  SOB. Pt. reports that pain is similar to prior episode of CP in 2019 which required stents. Pt states stressors involving multiple relocations over the past few months.    Patient denies any  chest pain after coming here , patient is delusional , patient says she had stents in the past , was not compliant to brillinta or plavix cliames she had marks on skin , patient blood work showed negative troponin , non t changes in EKG     REVIEW OF SYSTEMS:  All other review of systems is negative unless indicated above    Vital Signs Last 24 Hrs  T(C): 36.5 (29 Dec 2020 08:57), Max: 36.5 (29 Dec 2020 08:57)  T(F): 97.7 (29 Dec 2020 08:57), Max: 97.7 (29 Dec 2020 08:57)  HR: 81 (29 Dec 2020 12:52) (71 - 81)  BP: 98/45 (29 Dec 2020 08:57) (98/45 - 115/74)  BP(mean): 70 (28 Dec 2020 22:04) (70 - 70)  RR: 18 (29 Dec 2020 08:57) (16 - 18)  SpO2: 100% (29 Dec 2020 08:57) (100% - 100%)      PHYSICAL EXAM:    Constitutional: NAD, awake and alert, well-developed  HEENT: PERR, EOMI, Normal Hearing, MMM  Neck: Soft and supple, No LAD, No JVD  Respiratory: Breath sounds are clear bilaterally, No wheezing, rales or rhonchi  Cardiovascular: S1 and S2, regular rate and rhythm, no Murmurs, gallops or rubs  Gastrointestinal: Bowel Sounds present, soft, nontender, nondistended, no guarding, no rebound  Extremities: No peripheral edema  Vascular: 2+ peripheral pulses  Neurological: A/O x 3, no focal deficits  Musculoskeletal: 5/5 strength b/l upper and lower extremities  Skin: No rashes    Medications:  MEDICATIONS  (STANDING):  aspirin  chewable 81 milliGRAM(s) Oral daily  clonazePAM  Tablet 0.5 milliGRAM(s) Oral three times a day  heparin   Injectable 5000 Unit(s) SubCutaneous every 12 hours  lisinopril 5 milliGRAM(s) Oral daily      Labs: All Labs Reviewed:                        13.2   7.43  )-----------( 108      ( 29 Dec 2020 07:23 )             40.8     12-29    144  |  113<H>  |  27<H>  ----------------------------<  101<H>  4.3   |  26  |  0.86    Ca    9.0      29 Dec 2020 07:23  Mg     2.1     12-28    TPro  6.8  /  Alb  3.4  /  TBili  0.5  /  DBili  x   /  AST  15  /  ALT  21  /  AlkPhos  65  12-28    PT/INR - ( 28 Dec 2020 13:23 )   PT: 11.5 sec;   INR: 0.99 ratio         PTT - ( 28 Dec 2020 13:23 )  PTT:29.1 sec      CARDIAC MARKERS ( 29 Dec 2020 07:23 )  <0.015 ng/mL / x     / 72 U/L / x     / x      CARDIAC MARKERS ( 28 Dec 2020 20:49 )  <0.015 ng/mL / x     / 95 U/L / x     / x      CARDIAC MARKERS ( 28 Dec 2020 13:23 )  <0.015 ng/mL / x     / x     / x     / x        RADIOLOGY/EKG: all tests were reviewed   < from: Xray Chest 1 View- PORTABLE-Urgent (12.28.20 @ 13:03) >    EXAM:  XR CHEST PORTABLE URGENT 1V                            PROCEDURE DATE:  12/28/2020          INTERPRETATION:  Chest radiograph    CLINICAL INFORMATION: Chest pain    TECHNIQUE:  PA view of the chest    FINDINGS:  1/21/2017 chest radiograph available for review.    The lungs are clear of airspace consolidations or effusions. No pneumothorax.    The heart and mediastinum are normal in configuration. No gross hilar mass..    Visualized osseous structures are intact.    IMPRESSION:  No acute radiographic cardiopulmonary pathology.    < end of copied text >      DVT PPX: heparin sq     Advance Directive: fullc ode     Disposition: npo p midnight for nuclear stress test tomorrow

## 2020-12-29 NOTE — CONSULT NOTE ADULT - SUBJECTIVE AND OBJECTIVE BOX
CHIEF COMPLAINT:  chest pain yesterday twice     HPI:  59yr old female PMH HTN, HLD, CAD, stents, anxiety, bipolar, chronic back pain, herniated discs BIBEMS p/w CP for 8 hours. Pt. states that chest pain was accompanied with diaphoresis mild nausea and  SOB. Pt. reports that pain is similar to prior episode of CP in 2019 which required stents. Pt states stressors involving multiple relocations over the past few months.    Patient denies any  chest pain after coming here , patient is delusional , patient says she had stents in the past , was not compliant to brillinta or plavix cliames she had marks on skin ,   patient blood work showed negative troponin , non t changes in EKG     PAST MEDICAL & SURGICAL HISTORY:  Hyperlipidemia    CAD (coronary artery disease)    Anxiety    PTSD (post-traumatic stress disorder)    Secondary hypertension    Heart disease    Bipolar 1 disorder    H/O angioplasty  stents placed in  &amp; 2019    History of bilateral breast reduction surgery    History of ovarian cyst    Perforated ulcer    History of lumpectomy    History of appendectomy        Allergies    morphine (Nausea)  morphine (Vomiting)  Nuts (Unknown)  peanuts (Hives; Anaphylaxis)  peanuts (Rash)    Intolerances        SOCIAL HISTORY: former smoker     FAMILY HISTORY:  FH: lung cancer  Father  of Lung Cancer @ age 55; Brother currently has lung cancer    Family history of breast cancer in mother  Mother  at age 55 of breast cancer    FHx: diabetes mellitus  sister        MEDICATIONS:Home Medications:  aspirin 81 mg oral tablet: 4 tab(s) orally once a day (28 Dec 2020 15:05)  cetirizine 10 mg oral tablet: 1 tab(s) orally once a day, As Needed (28 Dec 2020 15:06)  doxepin 50 mg oral capsule: 2 cap(s) orally once a day (at bedtime) (28 Dec 2020 15:05)  KlonoPIN 0.5 mg oral tablet: 1 tab(s) orally 3 times a day (28 Dec 2020 15:05)  lisinopril 5 mg oral tablet: 1 tab(s) orally once a day (28 Dec 2020 15:05)  Medical marijuana:  (28 Dec 2020 15:06)  Vitamin D3 1000 intl units (25 mcg) oral tablet, chewable: 1 tab(s) orally once a day (28 Dec 2020 15:06)    MEDICATIONS  (STANDING):  aspirin  chewable 81 milliGRAM(s) Oral daily  clonazePAM  Tablet 0.5 milliGRAM(s) Oral three times a day  heparin   Injectable 5000 Unit(s) SubCutaneous every 12 hours  lisinopril 5 milliGRAM(s) Oral daily    MEDICATIONS  (PRN):  acetaminophen   Tablet .. 650 milliGRAM(s) Oral every 6 hours PRN Mild Pain (1 - 3)  aluminum hydroxide/magnesium hydroxide/simethicone Suspension 30 milliLiter(s) Oral every 4 hours PRN Dyspepsia  haloperidol    Injectable 2.5 milliGRAM(s) IntraMuscular every 6 hours PRN Agitation  traZODone 50 milliGRAM(s) Oral at bedtime PRN Insomnia      REVIEW OF SYSTEMS:    CONSTITUTIONAL: No weakness, fevers or chills  EYES/ENT: No visual changes;  No vertigo or throat pain   NECK: No pain or stiffness  RESPIRATORY: No cough, wheezing, hemoptysis; No shortness of breath  CARDIOVASCULAR: No chest pain or palpitations  GASTROINTESTINAL: No abdominal or epigastric pain. No nausea, vomiting, or hematemesis; No diarrhea or constipation. No melena or hematochezia.  GENITOURINARY: No dysuria, frequency or hematuria  NEUROLOGICAL: No numbness or weakness  SKIN: No itching, burning, rashes, or lesions   All other review of systems is negative unless indicated above    Vital Signs Last 24 Hrs  T(C): 36.5 (29 Dec 2020 08:57), Max: 36.8 (28 Dec 2020 12:29)  T(F): 97.7 (29 Dec 2020 08:57), Max: 98.3 (28 Dec 2020 12:29)  HR: 71 (29 Dec 2020 08:57) (71 - 80)  BP: 98/45 (29 Dec 2020 08:57) (98/45 - 127/80)  BP(mean): 70 (28 Dec 2020 22:04) (70 - 70)  RR: 18 (29 Dec 2020 08:57) (15 - 18)  SpO2: 100% (29 Dec 2020 08:57) (100% - 100%)    I&O's Summary      PHYSICAL EXAM:    Constitutional: NAD, awake and alert, well-developed  HEENT: PERR, EOMI,  No oral cyananosis.  Neck:  supple,  No JVD  Respiratory: Breath sounds are clear bilaterally, No wheezing, rales or rhonchi  Cardiovascular: S1 and S2, regular rate and rhythm, no Murmurs, gallops or rubs  Gastrointestinal: Bowel Sounds present, soft, nontender.   Extremities: No peripheral edema. No clubbing or cyanosis.  Vascular: 2+ peripheral pulses  Neurological: A/O x 3, no focal deficits  Musculoskeletal: no calf tenderness.  Skin: No rashes.      LABS: All Labs Reviewed:                        13.2   7.43  )-----------( 108      ( 29 Dec 2020 07:23 )             40.8                         12.8   6.49  )-----------( 109      ( 28 Dec 2020 13:23 )             38.1     29 Dec 2020 07:23    144    |  113    |  27     ----------------------------<  101    4.3     |  26     |  0.86   28 Dec 2020 13:23    141    |  112    |  17     ----------------------------<  92     3.8     |  24     |  0.90     Ca    9.0        29 Dec 2020 07:23  Ca    9.1        28 Dec 2020 13:23  Mg     2.1       28 Dec 2020 13:23    TPro  6.8    /  Alb  3.4    /  TBili  0.5    /  DBili  x      /  AST  15     /  ALT  21     /  AlkPhos  65     28 Dec 2020 13:23    PT/INR - ( 28 Dec 2020 13:23 )   PT: 11.5 sec;   INR: 0.99 ratio         PTT - ( 28 Dec 2020 13:23 )  PTT:29.1 sec  CARDIAC MARKERS ( 29 Dec 2020 07:23 )  <0.015 ng/mL / x     / 72 U/L / x     / x      CARDIAC MARKERS ( 28 Dec 2020 20:49 )  <0.015 ng/mL / x     / 95 U/L / x     / x      CARDIAC MARKERS ( 28 Dec 2020 13:23 )  <0.015 ng/mL / x     / x     / x     / x          Blood Culture:    @ 13:23  Pro Bnp 68        RADIOLOGY/EKG:e< from: 12 Lead ECG (20 @ 12:34) >  Normal sinus rhythm  Nonspecific T wave abnormality  Abnormal ECG  No previous ECGs available  Confirmed by YANE VILLALTA, JOSE SHEA ([id number]) on 2020 9:47:18 AM    < end of copied text >  < from: 12 Lead ECG (20 @ 07:58) >  Normal sinus rhythm  Nonspecific T wave abnormality  Abnormal ECG  When compared with ECG of 28-DEC-2020 12:34,  Nonspecific T wave abnormality now evident in Inferior leads  Confirmed by YANE VILLALTA, JOSE SHEA ([id number]) on 2020 9:40:27 AM    < end of copied text >      monitor sinus rhythm

## 2020-12-30 ENCOUNTER — TRANSCRIPTION ENCOUNTER (OUTPATIENT)
Age: 60
End: 2020-12-30

## 2020-12-30 LAB
ANION GAP SERPL CALC-SCNC: 3 MMOL/L — LOW (ref 5–17)
BUN SERPL-MCNC: 25 MG/DL — HIGH (ref 7–23)
CALCIUM SERPL-MCNC: 9 MG/DL — SIGNIFICANT CHANGE UP (ref 8.5–10.1)
CHLORIDE SERPL-SCNC: 110 MMOL/L — HIGH (ref 96–108)
CO2 SERPL-SCNC: 29 MMOL/L — SIGNIFICANT CHANGE UP (ref 22–31)
CREAT SERPL-MCNC: 0.96 MG/DL — SIGNIFICANT CHANGE UP (ref 0.5–1.3)
GLUCOSE SERPL-MCNC: 95 MG/DL — SIGNIFICANT CHANGE UP (ref 70–99)
HCT VFR BLD CALC: 40.1 % — SIGNIFICANT CHANGE UP (ref 34.5–45)
HGB BLD-MCNC: 13.1 G/DL — SIGNIFICANT CHANGE UP (ref 11.5–15.5)
MCHC RBC-ENTMCNC: 32.7 GM/DL — SIGNIFICANT CHANGE UP (ref 32–36)
MCHC RBC-ENTMCNC: 33.2 PG — SIGNIFICANT CHANGE UP (ref 27–34)
MCV RBC AUTO: 101.8 FL — HIGH (ref 80–100)
PLATELET # BLD AUTO: 115 K/UL — LOW (ref 150–400)
POTASSIUM SERPL-MCNC: 3.9 MMOL/L — SIGNIFICANT CHANGE UP (ref 3.5–5.3)
POTASSIUM SERPL-SCNC: 3.9 MMOL/L — SIGNIFICANT CHANGE UP (ref 3.5–5.3)
RBC # BLD: 3.94 M/UL — SIGNIFICANT CHANGE UP (ref 3.8–5.2)
RBC # FLD: 12.7 % — SIGNIFICANT CHANGE UP (ref 10.3–14.5)
SODIUM SERPL-SCNC: 142 MMOL/L — SIGNIFICANT CHANGE UP (ref 135–145)
WBC # BLD: 6.62 K/UL — SIGNIFICANT CHANGE UP (ref 3.8–10.5)
WBC # FLD AUTO: 6.62 K/UL — SIGNIFICANT CHANGE UP (ref 3.8–10.5)

## 2020-12-30 PROCEDURE — 78452 HT MUSCLE IMAGE SPECT MULT: CPT | Mod: 26

## 2020-12-30 PROCEDURE — 99232 SBSQ HOSP IP/OBS MODERATE 35: CPT

## 2020-12-30 PROCEDURE — 93016 CV STRESS TEST SUPVJ ONLY: CPT

## 2020-12-30 PROCEDURE — 93018 CV STRESS TEST I&R ONLY: CPT

## 2020-12-30 RX ORDER — DOXEPIN HCL 100 MG
100 CAPSULE ORAL ONCE
Refills: 0 | Status: COMPLETED | OUTPATIENT
Start: 2020-12-30 | End: 2020-12-30

## 2020-12-30 RX ORDER — REGADENOSON 0.08 MG/ML
0.4 INJECTION, SOLUTION INTRAVENOUS ONCE
Refills: 0 | Status: COMPLETED | OUTPATIENT
Start: 2020-12-30 | End: 2020-12-30

## 2020-12-30 RX ADMIN — Medication 0.5 MILLIGRAM(S): at 06:12

## 2020-12-30 RX ADMIN — Medication 0.5 MILLIGRAM(S): at 21:01

## 2020-12-30 RX ADMIN — Medication 81 MILLIGRAM(S): at 10:45

## 2020-12-30 RX ADMIN — LISINOPRIL 5 MILLIGRAM(S): 2.5 TABLET ORAL at 10:46

## 2020-12-30 RX ADMIN — Medication 100 MILLIGRAM(S): at 21:01

## 2020-12-30 RX ADMIN — Medication 650 MILLIGRAM(S): at 18:18

## 2020-12-30 RX ADMIN — REGADENOSON 0.4 MILLIGRAM(S): 0.08 INJECTION, SOLUTION INTRAVENOUS at 09:12

## 2020-12-30 RX ADMIN — Medication 0.5 MILLIGRAM(S): at 13:35

## 2020-12-30 NOTE — DISCHARGE NOTE PROVIDER - NSDCCPTREATMENT_GEN_ALL_CORE_FT
PRINCIPAL PROCEDURE  Procedure: TTE (transthoracic echocardiography)  Findings and Treatment: Impression   Summary   Fibrocalcific changes noted to the Aortic valve leaflets with preserved   leaflet excursion.   Mild (1+) aortic regurgitation is present.   The left atrium is mildly dilated.   Estimated left ventricular ejection fraction is 60-65 %.   The left ventricle is normal in size, wall thickness, wall motion and   contractility as seen in limited views.   IVC was not visualized within the subcostal view.   Mild (1+) mitral regurgitation is present.   EA reversal of the mitral inflow consistent with reduced compliance of the   left ventricle.   No evidence of pericardial effusion.   No evidence of pleural effusion.   Pulmonic valve not well seen.   Normal appearing right atrium.   Normal appearing right ventricle structure and function.   Mild to Moderate Tricuspid regurgitation is present.         PRINCIPAL PROCEDURE  Procedure: TTE (transthoracic echocardiography)  Findings and Treatment: Impression   Summary   Fibrocalcific changes noted to the Aortic valve leaflets with preserved   leaflet excursion.   Mild (1+) aortic regurgitation is present.   The left atrium is mildly dilated.   Estimated left ventricular ejection fraction is 60-65 %.   The left ventricle is normal in size, wall thickness, wall motion and   contractility as seen in limited views.   IVC was not visualized within the subcostal view.   Mild (1+) mitral regurgitation is present.   EA reversal of the mitral inflow consistent with reduced compliance of the   left ventricle.   No evidence of pericardial effusion.   No evidence of pleural effusion.   Pulmonic valve not well seen.   Normal appearing right atrium.   Normal appearing right ventricle structure and function.   Mild to Moderate Tricuspid regurgitation is present.        SECONDARY PROCEDURE  Procedure: Stress echo  Findings and Treatment: Normal SPECT Myocardial Perfusion Imaging.  No scan evidence of reversible or fixed perfusion defects.  Normal left ventricular contractility with an ejection fraction of 67% (Normal: 50% or greater).  No regional wall motion abnormalities.  Please refer to cardiac stress test report for Regadenoson dose administered, EKG findings and symptoms during the procedure.

## 2020-12-30 NOTE — DISCHARGE NOTE PROVIDER - NSDCCPCAREPLAN_GEN_ALL_CORE_FT
PRINCIPAL DISCHARGE DIAGNOSIS  Diagnosis: Chest pain with moderate risk for cardiac etiology  Assessment and Plan of Treatment: # Chest pain/CAD (stents)  with moderate risk for cardiac etiology   appears to be atypical , with significant cardiac history  trops negative x 3    non specific EKG changes   TTE - wnl   chemical nuclear stress on 12/30 -   continue ecotrin statin   Cardiology consult appreciated        SECONDARY DISCHARGE DIAGNOSES  Diagnosis: Benign essential HTN  Assessment and Plan of Treatment: # HTN  - controlled  - cont. lisinopril  - cont. 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 disorder  Assessment and Plan of Treatment: #Bipolar/anxiety  Psych consult noted  C/W Klonopin  C/W Trazadone   C/W Haldol     PRINCIPAL DISCHARGE DIAGNOSIS  Diagnosis: Chest pain with moderate risk for cardiac etiology  Assessment and Plan of Treatment: # Chest pain/CAD (stents) - with moderate risk for cardiac etiology   - appears to be atypical , with significant cardiac history  - trops negative x 3    - non specific EKG changes   - TTE - wnl   - chemical nuclear stress on 12/30 - wnl  - continue ecotrin statin   - Cardiology consult appreciated        SECONDARY DISCHARGE DIAGNOSES  Diagnosis: Benign essential HTN  Assessment and Plan of Treatment: # HTN  - controlled  - cont. lisinopril  - cont. 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 disorder  Assessment and Plan of Treatment: #Bipolar/anxiety  Psych consult noted  C/W Klonopin  C/W Trazadone   C/W Haldol

## 2020-12-30 NOTE — DISCHARGE NOTE PROVIDER - CARE PROVIDERS DIRECT ADDRESSES
[email or web address]

## 2020-12-30 NOTE — DISCHARGE NOTE PROVIDER - NSDCMRMEDTOKEN_GEN_ALL_CORE_FT
aspirin 81 mg oral tablet: 4 tab(s) orally once a day  cetirizine 10 mg oral tablet: 1 tab(s) orally once a day, As Needed  cloNIDine 0.1 mg oral tablet: 1 tab(s) orally once a day, As Needed  doxepin 50 mg oral capsule: 2 cap(s) orally once a day (at bedtime)  KlonoPIN 0.5 mg oral tablet: 1 tab(s) orally 3 times a day  lisinopril 5 mg oral tablet: 1 tab(s) orally once a day  Medical marijuana:   Vitamin D3 1000 intl units (25 mcg) oral tablet, chewable: 1 tab(s) orally once a day

## 2020-12-30 NOTE — DISCHARGE NOTE NURSING/CASE MANAGEMENT/SOCIAL WORK - PATIENT PORTAL LINK FT
You can access the FollowMyHealth Patient Portal offered by [facility] by registering at the following website: [email or web address]. By joining [employer]’s FollowMyHealth portal, you will also be able to view your health information using other applications (apps) compatible with our system.

## 2020-12-30 NOTE — DISCHARGE NOTE PROVIDER - HOSPITAL COURSE
59yr old female PMH HTN, HLD, CAD, stents, anxiety, bipolar, chronic back pain, herniated discs BIBEMS p/w CP for 8 hours. Pt. states that chest pain was accompanied with diaphoresis mild nausea and SOB. Pt. reports that pain is similar to prior episode of CP in 2019 which required stents. Pt states stressors involving multiple relocations over the past few months.  Pt also with grandiose delusions that she inherited a million dollars and owns her own estate, and that people are after her.     # Chest pain/CAD (stents)  with moderate risk for cardiac etiology   appears to be atypical , with significant cardiac history  trops negative x 3    non specific EKG changes   TTE - with preserved EF  chemical nuclear stress on 12/30 -   continue ecotrin statin   Cardiology consult appreciated    # HTN  - controlled  - cont. lisinopril  - cont. clonidine 0.1 mg po daily prn for SBP>160     #Bipolar/anxiety  Psych consult noted  C/W Klonopin  C/W Trazadone   C/W Haldol    Pt. is stable for discharge, will follow up with PCP and cardiology in 1-2 weeks.     PHYSICAL EXAM:  Vital Signs Last 24 Hrs  T(C): 36.3 (30 Dec 2020 05:26), Max: 36.9 (29 Dec 2020 17:02)  T(F): 97.3 (30 Dec 2020 05:26), Max: 98.5 (29 Dec 2020 17:02)  HR: 82 (30 Dec 2020 10:58) (65 - 82)  BP: 159/83 (30 Dec 2020 10:58) (125/83 - 159/83)  BP(mean): --  RR: 18 (30 Dec 2020 10:58) (18 - 18)  SpO2: 100% (30 Dec 2020 10:58) (98% - 100%)  Constitutional: NAD, awake and alert, well-developed  HEENT: PERR, EOMI, Normal Hearing, MMM  Neck: Soft and supple, No LAD, No JVD  Respiratory: Breath sounds are clear bilaterally, No wheezing, rales or rhonchi  Cardiovascular: S1 and S2, regular rate and rhythm, no Murmurs, gallops or rubs  Gastrointestinal: Bowel Sounds present, soft, nontender, nondistended, no guarding, no rebound  Extremities: No peripheral edema  Vascular: 2+ peripheral pulses  Neurological: A/O x 3, no focal deficits  Musculoskeletal: 5/5 strength b/l upper and lower extremities  Skin: No rashes   59yr old female PMH HTN, HLD, CAD, stents, anxiety, bipolar, chronic back pain, herniated discs BIBEMS p/w CP for 8 hours. Pt. states that chest pain was accompanied with diaphoresis mild nausea and SOB. Pt. reports that pain is similar to prior episode of CP in 2019 which required stents. Pt states stressors involving multiple relocations over the past few months.  Pt also with grandiose delusions that she inherited a million dollars and owns her own estate, and that people are after her.     # Chest pain/CAD (stents)  with moderate risk for cardiac etiology   appears to be atypical , with significant cardiac history  trops negative x 3    non specific EKG changes   TTE - with preserved EF  chemical nuclear stress on 12/30 -   continue ecotrin statin   Cardiology consult appreciated    # HTN  - controlled  - cont. lisinopril  - cont. clonidine 0.1 mg po daily prn for SBP>160     #Bipolar/anxiety  Psych consult noted  C/W Klonopin  C/W Trazadone   C/W Haldol    Pt. is stable for discharge. Pt. states she is moving to [place], will not go back to her cardiologist in [place]   - will follow up with Dr. Mitchell in 1-2 weeks.    PHYSICAL EXAM:  Vital Signs Last 24 Hrs  T(C): 36.3 (30 Dec 2020 05:26), Max: 36.9 (29 Dec 2020 17:02)  T(F): 97.3 (30 Dec 2020 05:26), Max: 98.5 (29 Dec 2020 17:02)  HR: 82 (30 Dec 2020 10:58) (65 - 82)  BP: 159/83 (30 Dec 2020 10:58) (125/83 - 159/83)  BP(mean): --  RR: 18 (30 Dec 2020 10:58) (18 - 18)  SpO2: 100% (30 Dec 2020 10:58) (98% - 100%)  Constitutional: NAD, awake and alert, well-developed  HEENT: PERR, EOMI, Normal Hearing, MMM  Neck: Soft and supple, No LAD, No JVD  Respiratory: Breath sounds are clear bilaterally, No wheezing, rales or rhonchi  Cardiovascular: S1 and S2, regular rate and rhythm, no Murmurs, gallops or rubs  Gastrointestinal: Bowel Sounds present, soft, nontender, nondistended, no guarding, no rebound  Extremities: No peripheral edema  Vascular: 2+ peripheral pulses  Neurological: A/O x 3, no focal deficits  Musculoskeletal: 5/5 strength b/l upper and lower extremities  Skin: No rashes   59yr old female PMH HTN, HLD, CAD, stents, anxiety, bipolar, chronic back pain, herniated discs BIBEMS p/w CP for 8 hours. Pt. states that chest pain was accompanied with diaphoresis mild nausea and SOB. Pt. reports that pain is similar to prior episode of CP in 2019 which required stents. Pt states stressors involving multiple relocations over the past few months.  Pt also with grandiose delusions that she inherited a million dollars and owns her own estate, and that people are after her.     # Chest pain/CAD (stents)  with moderate risk for cardiac etiology   appears to be atypical , with significant cardiac history  trops negative x 3    non specific EKG changes   TTE - with preserved EF  chemical nuclear stress on 12/30 - IMPRESSION: Normal SPECT Myocardial Perfusion Imaging. No scan evidence of reversible or fixed perfusion defects. Normal left ventricular contractility with an ejection fraction of 67% (Normal: 50% or greater). No regional wall motion abnormalities.  continue ecotrin statin   Cardiology consult appreciated    # HTN  - controlled  - cont. lisinopril  - cont. clonidine 0.1 mg po daily prn for SBP>160     #Bipolar/anxiety  Psych consult noted  C/W Klonopin  C/W Trazadone   C/W Haldol    Pt. is stable for discharge. Pt. states she is moving to [place], will not go back to her cardiologist in [place]   - will follow up with Dr. Mitchell in 1-2 weeks.    PHYSICAL EXAM:  Constitutional: NAD, awake and alert, well-developed  HEENT: PERR, EOMI, Normal Hearing, MMM  Neck: Soft and supple, No LAD, No JVD  Respiratory: Breath sounds are clear bilaterally, No wheezing, rales or rhonchi  Cardiovascular: S1 and S2, regular rate and rhythm, no Murmurs, gallops or rubs  Gastrointestinal: Bowel Sounds present, soft, nontender, nondistended, no guarding, no rebound  Extremities: No peripheral edema  Vascular: 2+ peripheral pulses  Neurological: A/O x 3, no focal deficits  Musculoskeletal: 5/5 strength b/l upper and lower extremities  Skin: No rashes

## 2020-12-30 NOTE — DISCHARGE NOTE PROVIDER - CARE PROVIDER_API CALL
Lakhwinder Mitchell  CARDIOVASCULAR DISEASE  [address] [facility] [address]  [place] [address]  Phone: [phone]  Fax: [phone]  Follow Up Time:

## 2020-12-30 NOTE — DISCHARGE NOTE PROVIDER - NSDCFUADDINST_GEN_ALL_CORE_FT
Follow up with PCP and cardiology in 1-2 weeks  Follow up with cardiology - Dr. Mitchell - in 1-2 weeks

## 2020-12-31 VITALS
HEART RATE: 78 BPM | DIASTOLIC BLOOD PRESSURE: 80 MMHG | TEMPERATURE: 98 F | OXYGEN SATURATION: 100 % | RESPIRATION RATE: 18 BRPM | SYSTOLIC BLOOD PRESSURE: 132 MMHG

## 2020-12-31 PROCEDURE — 99239 HOSP IP/OBS DSCHRG MGMT >30: CPT

## 2020-12-31 RX ADMIN — Medication 81 MILLIGRAM(S): at 09:25

## 2020-12-31 RX ADMIN — LISINOPRIL 5 MILLIGRAM(S): 2.5 TABLET ORAL at 09:25

## 2020-12-31 RX ADMIN — Medication 0.5 MILLIGRAM(S): at 06:16

## 2020-12-31 RX ADMIN — Medication 0.5 MILLIGRAM(S): at 12:05

## 2021-01-01 PROCEDURE — G9005: CPT

## 2021-01-05 DIAGNOSIS — Z88.5 ALLERGY STATUS TO NARCOTIC AGENT: ICD-10-CM

## 2021-01-05 DIAGNOSIS — E78.5 HYPERLIPIDEMIA, UNSPECIFIED: ICD-10-CM

## 2021-01-05 DIAGNOSIS — Z79.899 OTHER LONG TERM (CURRENT) DRUG THERAPY: ICD-10-CM

## 2021-01-05 DIAGNOSIS — Z79.82 LONG TERM (CURRENT) USE OF ASPIRIN: ICD-10-CM

## 2021-01-05 DIAGNOSIS — F43.10 POST-TRAUMATIC STRESS DISORDER, UNSPECIFIED: ICD-10-CM

## 2021-01-05 DIAGNOSIS — Z91.14 PATIENT'S OTHER NONCOMPLIANCE WITH MEDICATION REGIMEN: ICD-10-CM

## 2021-01-05 DIAGNOSIS — F39 UNSPECIFIED MOOD [AFFECTIVE] DISORDER: ICD-10-CM
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
## 2021-01-05 DIAGNOSIS — M54.9 DORSALGIA, UNSPECIFIED: ICD-10-CM

## 2021-01-05 DIAGNOSIS — I25.10 ATHEROSCLEROTIC HEART DISEASE OF NATIVE CORONARY ARTERY WITHOUT ANGINA PECTORIS: ICD-10-CM

## 2021-01-05 DIAGNOSIS — Z91.010 ALLERGY TO PEANUTS: ICD-10-CM

## 2021-01-05 DIAGNOSIS — F41.9 ANXIETY DISORDER, UNSPECIFIED: ICD-10-CM

## 2021-01-05 DIAGNOSIS — F22 DELUSIONAL DISORDERS: ICD-10-CM

## 2021-01-05 DIAGNOSIS — F13.90 SEDATIVE, HYPNOTIC, OR ANXIOLYTIC USE, UNSPECIFIED, UNCOMPLICATED: ICD-10-CM

## 2021-01-05 DIAGNOSIS — R07.89 OTHER CHEST PAIN: ICD-10-CM

## 2021-01-05 DIAGNOSIS — G89.29 OTHER CHRONIC PAIN: ICD-10-CM

## 2021-01-05 DIAGNOSIS — I15.9 SECONDARY HYPERTENSION, UNSPECIFIED: ICD-10-CM

## 2021-01-05 DIAGNOSIS — I35.1 NONRHEUMATIC AORTIC (VALVE) INSUFFICIENCY: ICD-10-CM
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
## 2021-01-05 DIAGNOSIS — Z95.5 PRESENCE OF CORONARY ANGIOPLASTY IMPLANT AND GRAFT: ICD-10-CM

## 2021-02-04 ENCOUNTER — INPATIENT (INPATIENT)
Facility: HOSPITAL | Age: 61
LOS: 5 days | Discharge: HOME | DRG: 753 | End: 2021-02-10
Attending: PSYCHIATRY & NEUROLOGY | Admitting: PSYCHIATRY & NEUROLOGY
Payer: MEDICAID

## 2021-02-04 VITALS
SYSTOLIC BLOOD PRESSURE: 134 MMHG | OXYGEN SATURATION: 97 % | DIASTOLIC BLOOD PRESSURE: 91 MMHG | WEIGHT: 160.06 LBS | RESPIRATION RATE: 17 BRPM | HEART RATE: 98 BPM | TEMPERATURE: 99 F | HEIGHT: 65 IN

## 2021-02-04 DIAGNOSIS — Z87.42 PERSONAL HISTORY OF OTHER DISEASES OF THE FEMALE GENITAL TRACT: Chronic | ICD-10-CM

## 2021-02-04 DIAGNOSIS — Z98.890 OTHER SPECIFIED POSTPROCEDURAL STATES: Chronic | ICD-10-CM

## 2021-02-04 DIAGNOSIS — K27.5 CHRONIC OR UNSPECIFIED PEPTIC ULCER, SITE UNSPECIFIED, WITH PERFORATION: Chronic | ICD-10-CM

## 2021-02-04 DIAGNOSIS — Z98.62 PERIPHERAL VASCULAR ANGIOPLASTY STATUS: Chronic | ICD-10-CM

## 2021-02-04 DIAGNOSIS — Z90.49 ACQUIRED ABSENCE OF OTHER SPECIFIED PARTS OF DIGESTIVE TRACT: Chronic | ICD-10-CM

## 2021-02-04 LAB
ALBUMIN SERPL ELPH-MCNC: 3.5 G/DL — SIGNIFICANT CHANGE UP (ref 3.3–5)
ALP SERPL-CCNC: 64 U/L — SIGNIFICANT CHANGE UP (ref 40–120)
ALT FLD-CCNC: 19 U/L — SIGNIFICANT CHANGE UP (ref 12–78)
ANION GAP SERPL CALC-SCNC: 6 MMOL/L — SIGNIFICANT CHANGE UP (ref 5–17)
APAP SERPL-MCNC: < 2 UG/ML (ref 10–30)
APPEARANCE UR: CLEAR — SIGNIFICANT CHANGE UP
AST SERPL-CCNC: 11 U/L — LOW (ref 15–37)
BASOPHILS # BLD AUTO: 0.04 K/UL — SIGNIFICANT CHANGE UP (ref 0–0.2)
BASOPHILS NFR BLD AUTO: 0.6 % — SIGNIFICANT CHANGE UP (ref 0–2)
BILIRUB SERPL-MCNC: 0.2 MG/DL — SIGNIFICANT CHANGE UP (ref 0.2–1.2)
BILIRUB UR-MCNC: NEGATIVE — SIGNIFICANT CHANGE UP
BUN SERPL-MCNC: 12 MG/DL — SIGNIFICANT CHANGE UP (ref 7–23)
CALCIUM SERPL-MCNC: 9.2 MG/DL — SIGNIFICANT CHANGE UP (ref 8.5–10.1)
CHLORIDE SERPL-SCNC: 112 MMOL/L — HIGH (ref 96–108)
CO2 SERPL-SCNC: 24 MMOL/L — SIGNIFICANT CHANGE UP (ref 22–31)
COLOR SPEC: YELLOW — SIGNIFICANT CHANGE UP
CREAT SERPL-MCNC: 0.85 MG/DL — SIGNIFICANT CHANGE UP (ref 0.5–1.3)
DIFF PNL FLD: ABNORMAL
EOSINOPHIL # BLD AUTO: 0.04 K/UL — SIGNIFICANT CHANGE UP (ref 0–0.5)
EOSINOPHIL NFR BLD AUTO: 0.6 % — SIGNIFICANT CHANGE UP (ref 0–6)
ETHANOL SERPL-MCNC: <10 MG/DL — SIGNIFICANT CHANGE UP (ref 0–10)
GLUCOSE SERPL-MCNC: 126 MG/DL — HIGH (ref 70–99)
GLUCOSE UR QL: NEGATIVE MG/DL — SIGNIFICANT CHANGE UP
HCT VFR BLD CALC: 38.5 % — SIGNIFICANT CHANGE UP (ref 34.5–45)
HGB BLD-MCNC: 13 G/DL — SIGNIFICANT CHANGE UP (ref 11.5–15.5)
IMM GRANULOCYTES NFR BLD AUTO: 0.1 % — SIGNIFICANT CHANGE UP (ref 0–1.5)
KETONES UR-MCNC: NEGATIVE — SIGNIFICANT CHANGE UP
LEUKOCYTE ESTERASE UR-ACNC: NEGATIVE — SIGNIFICANT CHANGE UP
LYMPHOCYTES # BLD AUTO: 1.98 K/UL — SIGNIFICANT CHANGE UP (ref 1–3.3)
LYMPHOCYTES # BLD AUTO: 28.4 % — SIGNIFICANT CHANGE UP (ref 13–44)
MCHC RBC-ENTMCNC: 33.7 PG — SIGNIFICANT CHANGE UP (ref 27–34)
MCHC RBC-ENTMCNC: 33.8 GM/DL — SIGNIFICANT CHANGE UP (ref 32–36)
MCV RBC AUTO: 99.7 FL — SIGNIFICANT CHANGE UP (ref 80–100)
MONOCYTES # BLD AUTO: 0.69 K/UL — SIGNIFICANT CHANGE UP (ref 0–0.9)
MONOCYTES NFR BLD AUTO: 9.9 % — SIGNIFICANT CHANGE UP (ref 2–14)
NEUTROPHILS # BLD AUTO: 4.21 K/UL — SIGNIFICANT CHANGE UP (ref 1.8–7.4)
NEUTROPHILS NFR BLD AUTO: 60.4 % — SIGNIFICANT CHANGE UP (ref 43–77)
NITRITE UR-MCNC: NEGATIVE — SIGNIFICANT CHANGE UP
PCP SPEC-MCNC: SIGNIFICANT CHANGE UP
PH UR: 7 — SIGNIFICANT CHANGE UP (ref 5–8)
PLATELET # BLD AUTO: 149 K/UL — LOW (ref 150–400)
POTASSIUM SERPL-MCNC: 3.6 MMOL/L — SIGNIFICANT CHANGE UP (ref 3.5–5.3)
POTASSIUM SERPL-SCNC: 3.6 MMOL/L — SIGNIFICANT CHANGE UP (ref 3.5–5.3)
PROT SERPL-MCNC: 7.1 GM/DL — SIGNIFICANT CHANGE UP (ref 6–8.3)
PROT UR-MCNC: NEGATIVE MG/DL — SIGNIFICANT CHANGE UP
RBC # BLD: 3.86 M/UL — SIGNIFICANT CHANGE UP (ref 3.8–5.2)
RBC # FLD: 13.7 % — SIGNIFICANT CHANGE UP (ref 10.3–14.5)
SALICYLATES SERPL-MCNC: 3.9 MG/DL — SIGNIFICANT CHANGE UP (ref 2.8–20)
SODIUM SERPL-SCNC: 142 MMOL/L — SIGNIFICANT CHANGE UP (ref 135–145)
SP GR SPEC: 1.01 — SIGNIFICANT CHANGE UP (ref 1.01–1.02)
TROPONIN I SERPL-MCNC: <0.015 NG/ML — SIGNIFICANT CHANGE UP (ref 0.01–0.04)
TROPONIN I SERPL-MCNC: <0.015 NG/ML — SIGNIFICANT CHANGE UP (ref 0.01–0.04)
UROBILINOGEN FLD QL: NEGATIVE MG/DL — SIGNIFICANT CHANGE UP
WBC # BLD: 6.97 K/UL — SIGNIFICANT CHANGE UP (ref 3.8–10.5)
WBC # FLD AUTO: 6.97 K/UL — SIGNIFICANT CHANGE UP (ref 3.8–10.5)

## 2021-02-04 PROCEDURE — 71045 X-RAY EXAM CHEST 1 VIEW: CPT | Mod: 26

## 2021-02-04 RX ORDER — CLONAZEPAM 1 MG
0.5 TABLET ORAL ONCE
Refills: 0 | Status: DISCONTINUED | OUTPATIENT
Start: 2021-02-04 | End: 2021-02-04

## 2021-02-04 RX ADMIN — Medication 0.5 MILLIGRAM(S): at 21:20

## 2021-02-04 NOTE — ED ADULT NURSE REASSESSMENT NOTE - NS ED NURSE REASSESS COMMENT FT1
Assumed care of pt. Pt resting comfortably and calmly at this time, denies chest pain, dizziness, N/V/respiratory distress. Offers no complaints. Pt would like voluntary admission to psych. Pt spoke with telepsych already, awaiting covid results for admission to floor. Pt denies HI/SI. All safety measures in place at this time.
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
## 2021-02-04 NOTE — ED PROVIDER NOTE - CLINICAL SUMMARY MEDICAL DECISION MAKING FREE TEXT BOX
61 y/o female hx of CAD and HTN presents with CP. Pt recently had myocardial perfusion and transthoracic echo one month ago. Will obtain troponin x2 and discuss with cardio. Although pt has significant risk factors, pt had negative workup one month ago. Will discuss with cardio.

## 2021-02-04 NOTE — ED PROVIDER NOTE - PHYSICAL EXAMINATION
Constitutional: NAD AAOx3  Eyes: PERRLA EOMI  Head: Normocephalic atraumatic  Mouth: MMM  Cardiac: regular rate   Resp: Lungs CTAB  GI: Abd s/nt/nd, no rebound or guarding.  Neuro: awake, alert, moving all extremities, cranial nerves 2-12 intact, sensation intact, no dysmetria.  Skin: No rashes
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
## 2021-02-04 NOTE — ED ADULT NURSE REASSESSMENT NOTE - NS ED NURSE REASSESS COMMENT FT1
pt swabbed for COVID at this time, sample collected and sent to lab. pt brought 2 sandwiches, juice and water for dinner. pt in no acute distress at this time, awaiting COVID results for voluntary psych placement.

## 2021-02-04 NOTE — ED BEHAVIORAL HEALTH ASSESSMENT NOTE - OTHER
single room occupancy hotel room limited CVM not assessed as pt was in bed during interview via telepsych looks much older than stated age; swollen eyes/looks exhausted no assessed - telepsych eval [employer] [facility] external billing deferred grandiose delusions including having inherited large sum of money recently; paranoid delusions including that people are trying to kill her

## 2021-02-04 NOTE — ED BEHAVIORAL HEALTH ASSESSMENT NOTE - REASON
PENDING COVID RESULT; will then need inpatient psych admission on [facility] legal status

## 2021-02-04 NOTE — ED BEHAVIORAL HEALTH ASSESSMENT NOTE - NSPRESENTSXS_PSY_ALL_CORE
disorganized thinking v impaired reasoning; grandiosity Impulsivity/Global insomnia/Severe anxiety, agitation or panic
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
## 2021-02-04 NOTE — ED PROVIDER NOTE - PROGRESS NOTE DETAILS
Palak Camacho for attending Dr. Vieira: Discussed with Dr. Palla who saw pt in hospital in December 2020. Pt had myocardial perfusion and echo. If troponin x2, pt an be d/c home from cardiac perspective. Pt instructed to return to ED if she has continued CP and to follow with Dr. Palla. Kaci Lugo MD: Troponins stable. Pt is medically cleared for psych. Telepsych consulted for grandiosity. Palak Camacho for attending Dr. Vieira: Signed out to Dr. Manrique. romulo: covid neg, telepsych rec's adm to dr conway

## 2021-02-04 NOTE — ED BEHAVIORAL HEALTH ASSESSMENT NOTE - SUMMARY
61yo single, non-caregiver, unemployed,  female, estranged from family, unstable living situation, with long history of alcohol abuse (history of withdrawals but no seizures), a strong Axis II Histrionic features usually centering around attention from men, history of benzodiazepine misuse (Xanax, Valium), with reported history of Bipolar Disorder (rule out Substance Induced Mood Disorder), several inpatient hospitalizations (last at [facility] in 09/16), 1 prior suicide attempt via OD in 2012, no hx of violence, no hx of arrests, hx of belligerent, confrontational behavior with others resulting in getting kicked out of shelters, rentals, who self-presents today complaining of chest pain and shortness of breathe. Patient consulted for grandiosity. This is a 60 year old single, unemployed,  female, non-caregiver, domiciled in [place] apartment, alcohol abuse (1-3 bottles of wine daily, history of withdrawals but no seizures), history of benzodiazepine misuse (Xanax, Valium), history bipolar 2, several inpatient hospitalizations (most recently 9/16), one prior suicide attempt by overdose in 2012, reported axis II - histrionic traits, currently in treatment with a psychiatrist and therapist (on doxepin 100 mg and klonopin 0.5 mg tid prn), no hx of violence, no hx of arrests, self presenting per recommendation of her therapist who was concerned about possible decompensation. Pt presents with mood lability, tearfulness, acute anxiety/distress, grandiose and paranoid ideations ie having inherited large sum of money, believing people are going to kill her, believing she recently encountered the graves of two dead children, etc. Collateral from her therapist is concerning that pt was "missing" and unreachable for several days which is not typical for her. She would benefit from inpatient psych admission for acute stabilization, medication assessment, which pt is amenable to - VOL legal status.

## 2021-02-04 NOTE — ED BEHAVIORAL HEALTH ASSESSMENT NOTE - NS ED BHA PLAN PSYCHIATRIC ISSUES2 FT
bipolar 2: pt is on doxepin 100 mg and klonopin 0.5 mg tid prn. cannot enter in doxepin orders - need to verify this or use patient's own meds which she has on her person.
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
## 2021-02-04 NOTE — ED BEHAVIORAL HEALTH ASSESSMENT NOTE - OTHER PAST PSYCHIATRIC HISTORY (INCLUDE DETAILS REGARDING ONSET, COURSE OF ILLNESS, INPATIENT/OUTPATIENT TREATMENT)
Pt reports she is on trazodone 100mg qhs as needed for sleep and clonidine for her blood pressure. Pt reports hx of bipolar d/o w/ hx of karo but mostly depressive episodes. Pt reports she has tried everything as far as medications and does not remember what has worked. 7 prior hospitalizations. She reports her manic episode in the past as 2 days of euphoria, increased goal directed activity, and racing thoughts but was also using substances during this time. Last psych admission in 2016

## 2021-02-04 NOTE — ED ADULT TRIAGE NOTE - CHIEF COMPLAINT QUOTE
PT. C/O chest pain x 6 hours, pt. states hx of 6 stents and bipolar disorder. pt. was given 1 nitro and 4 baby ASA by EMS PTA, STAT EKG performed.
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
## 2021-02-04 NOTE — ED BEHAVIORAL HEALTH ASSESSMENT NOTE - RISK ASSESSMENT
Chronic risk factors: single, ongoing substance use; estranged from family and most friend; Axis II pathology; hx of belligerence; chronic unstable domicile; hx of suicide attempt. Protective factors: age < 65 yrs; medication and treatment compliant in general; access to health services.     Acute risk factors: unstable living situation, medical comorbidities Low Acute Suicide Risk Chronic risk factors: single, ongoing substance use; estranged from family and friends; Axis II pathology; hx of belligerence; chronic unstable domicile; hx of suicide attempt. Protective factors: age < 65 yrs; medication and treatment compliant in general; access to health services.   Acute risk factors: unstable living situation, medical comorbidities

## 2021-02-04 NOTE — ED BEHAVIORAL HEALTH ASSESSMENT NOTE - PAST PSYCHOTROPIC MEDICATION
cymbalta, seroquel gave pt RLS, gabapentin made her feel horrible, Abilify, prozac cymbalta, seroquel = RLS, gabapentin, Abilify, prozac

## 2021-02-05 DIAGNOSIS — F10.10 ALCOHOL ABUSE, UNCOMPLICATED: ICD-10-CM

## 2021-02-05 DIAGNOSIS — F22 DELUSIONAL DISORDERS: ICD-10-CM

## 2021-02-05 DIAGNOSIS — F13.20 SEDATIVE, HYPNOTIC OR ANXIOLYTIC DEPENDENCE, UNCOMPLICATED: ICD-10-CM

## 2021-02-05 DIAGNOSIS — F31.9 BIPOLAR DISORDER, UNSPECIFIED: ICD-10-CM

## 2021-02-05 LAB
CHOLEST SERPL-MCNC: 214 MG/DL — HIGH
HDLC SERPL-MCNC: 42 MG/DL — LOW
LIPID PNL WITH DIRECT LDL SERPL: 148 MG/DL — HIGH
NON HDL CHOLESTEROL: 172 MG/DL — HIGH
SARS-COV-2 RNA SPEC QL NAA+PROBE: SIGNIFICANT CHANGE UP
TRIGL SERPL-MCNC: 122 MG/DL — SIGNIFICANT CHANGE UP

## 2021-02-05 PROCEDURE — 81001 URINALYSIS AUTO W/SCOPE: CPT

## 2021-02-05 PROCEDURE — 83036 HEMOGLOBIN GLYCOSYLATED A1C: CPT

## 2021-02-05 PROCEDURE — 84443 ASSAY THYROID STIM HORMONE: CPT

## 2021-02-05 PROCEDURE — 36415 COLL VENOUS BLD VENIPUNCTURE: CPT

## 2021-02-05 PROCEDURE — 99232 SBSQ HOSP IP/OBS MODERATE 35: CPT

## 2021-02-05 PROCEDURE — 80061 LIPID PANEL: CPT

## 2021-02-05 PROCEDURE — 99222 1ST HOSP IP/OBS MODERATE 55: CPT

## 2021-02-05 RX ORDER — DOXEPIN HCL 100 MG
50 CAPSULE ORAL AT BEDTIME
Refills: 0 | Status: DISCONTINUED | OUTPATIENT
Start: 2021-02-05 | End: 2021-02-09

## 2021-02-05 RX ORDER — OLANZAPINE 15 MG/1
5 TABLET, FILM COATED ORAL EVERY 6 HOURS
Refills: 0 | Status: DISCONTINUED | OUTPATIENT
Start: 2021-02-05 | End: 2021-02-10

## 2021-02-05 RX ORDER — CLONAZEPAM 1 MG
0.5 TABLET ORAL EVERY 8 HOURS
Refills: 0 | Status: DISCONTINUED | OUTPATIENT
Start: 2021-02-05 | End: 2021-02-10

## 2021-02-05 RX ORDER — DIVALPROEX SODIUM 500 MG/1
250 TABLET, DELAYED RELEASE ORAL
Refills: 0 | Status: DISCONTINUED | OUTPATIENT
Start: 2021-02-05 | End: 2021-02-10

## 2021-02-05 RX ORDER — ACETAMINOPHEN 500 MG
650 TABLET ORAL EVERY 4 HOURS
Refills: 0 | Status: DISCONTINUED | OUTPATIENT
Start: 2021-02-05 | End: 2021-02-10

## 2021-02-05 RX ORDER — ACETAMINOPHEN 500 MG
1000 TABLET ORAL EVERY 6 HOURS
Refills: 0 | Status: DISCONTINUED | OUTPATIENT
Start: 2021-02-05 | End: 2021-02-10

## 2021-02-05 RX ORDER — TRAZODONE HCL 50 MG
50 TABLET ORAL AT BEDTIME
Refills: 0 | Status: DISCONTINUED | OUTPATIENT
Start: 2021-02-05 | End: 2021-02-10

## 2021-02-05 RX ORDER — LISINOPRIL 2.5 MG/1
5 TABLET ORAL DAILY
Refills: 0 | Status: DISCONTINUED | OUTPATIENT
Start: 2021-02-05 | End: 2021-02-10

## 2021-02-05 RX ORDER — CLONAZEPAM 1 MG
0.5 TABLET ORAL THREE TIMES A DAY
Refills: 0 | Status: DISCONTINUED | OUTPATIENT
Start: 2021-02-05 | End: 2021-02-05

## 2021-02-05 RX ORDER — INFLUENZA VIRUS VACCINE 15; 15; 15; 15 UG/.5ML; UG/.5ML; UG/.5ML; UG/.5ML
0.5 SUSPENSION INTRAMUSCULAR ONCE
Refills: 0 | Status: DISCONTINUED | OUTPATIENT
Start: 2021-02-05 | End: 2021-02-10

## 2021-02-05 RX ORDER — NICOTINE POLACRILEX 2 MG
2 GUM BUCCAL
Refills: 0 | Status: DISCONTINUED | OUTPATIENT
Start: 2021-02-05 | End: 2021-02-10

## 2021-02-05 RX ADMIN — Medication 2 MILLIGRAM(S): at 21:49

## 2021-02-05 RX ADMIN — Medication 2 MILLIGRAM(S): at 20:16

## 2021-02-05 RX ADMIN — Medication 0.5 MILLIGRAM(S): at 10:40

## 2021-02-05 RX ADMIN — LISINOPRIL 5 MILLIGRAM(S): 2.5 TABLET ORAL at 21:44

## 2021-02-05 RX ADMIN — Medication 0.5 MILLIGRAM(S): at 19:05

## 2021-02-05 RX ADMIN — Medication 650 MILLIGRAM(S): at 21:45

## 2021-02-05 NOTE — PROGRESS NOTE BEHAVIORAL HEALTH - NSBHADDHXMEDFT_PSY_A_CORE
heart disease (s/p stents), htn, chronic back pain heart disease (s/p stents), htn, chronic back painCAD (coronary artery disease)    Hyperlipidemia

## 2021-02-05 NOTE — PROGRESS NOTE BEHAVIORAL HEALTH - NSBHADDHXSUBSTFT_PSY_A_CORE
alcohol abuse (1-3 bottles of wine daily, history of withdrawals but no seizures), history of benzodiazepine misuse (Xanax, Valium),  Cannabis use disorder, alcohol use disorder, stimulant use in remission  vaping less than one JUUL cartridge daily  Cannabis; Cocaine/Crack  daily medical marijuana alcohol abuse (1-3 bottles of wine daily, history of withdrawals but no seizures), history of benzodiazepine misuse (Xanax, Valium),  Cannabis use disorder daily medical marijuana use, "most of the day," however has run out of her prescription in past week.   , alcohol use disorder, stimulant use in remission  substance treatment in past  vaping less than one JUUL cartridge daily  Cannabis; Cocaine/Crack  daily medical marijuana

## 2021-02-05 NOTE — ED ADULT NURSE REASSESSMENT NOTE - NS ED NURSE REASSESS COMMENT FT1
Pt's belongings sent to security and pt has been wanded by security. Pt remains calm and aware of plan. Will be going to [facility] shortly.

## 2021-02-05 NOTE — PROGRESS NOTE BEHAVIORAL HEALTH - NSBHADDHXPSYCHFT_PSY_A_CORE
several inpatient hospitalizations (most recentlyLast psych admission in  9/16/2016), one prior suicide attempt by overdose in 2012,  Psychiatrist Dr. Vázquez, several inpatient hospitalizations (Last psych admission in  9/16/2016), one prior suicide attempt by overdose in 2012,  Psychiatrist Dr. Vázquez,

## 2021-02-05 NOTE — PROGRESS NOTE BEHAVIORAL HEALTH - OTHER
deferred grandiose delusions including having inherited large sum of money recently; paranoid delusions including that people are trying to kill her

## 2021-02-05 NOTE — CONSULT NOTE ADULT - ASSESSMENT
PLAN:     1. Delusional Disorder   -Medication adjustment as per primary team     2. HTN  -Home medication Lisinopril reordered for patient   -continue to monitor vitals     3. HLD  -Lipid panel reviewed   -no prior record of statin use  -Consider starting low dose Lipitor in light of cardiac history     4. Abnormal EKG (Non specific s-t wave abnormality, Possible left atrial enlargement)   -similar findings on prior ekg in 12/2020   -patient has cardiac history   -originally complained of chest pain in ED, all troponin levels negative   -patient denies any chest pain, pressure or discomfort at this time     5. EtOH abuse      ASSESSMENT: 61 y/o F with past medical, psychiatric and surgical history as mentioned above presents for inpatient psych admission for delusional disorder.     PLAN:     1. Delusional Disorder   -Patient started on Depakote by Psychiatry   -Further Medication adjustment as per primary psych team     2. HTN  -Home medication Lisinopril reordered for patient   -continue to monitor vitals     3. HLD  -Lipid panel reviewed   -no prior record of statin use  -Consider starting low dose Lipitor in light of cardiac history     4. Abnormal EKG (Non specific s-t wave abnormality, Possible left atrial enlargement)   -Left Atrial Enlargement consistent with Echo findings (12/2020): mildly dilated left atria. LV EF 60-65%   -similar findings on prior EKG in 12/2020   -patient has cardiac history   -originally complained of chest pain in ED, all troponin levels negative   -patient denies any chest pain, pressure or discomfort at this time     5. EtOH Abuse  -last drink was two days ago   -patient not presenting with any signs of withdrawal at this time   -CiWa score 0   -CiWA order set initiated   -multivitamin, Folic Acid and Thiamine     6. Smoking Hx   -Patient has Nicotine gum while inpatient     7. Hyperglycemia   -Blood glucose mildly elevated   -no history of DM   -A1c in am     8. Abnormal UA   -Trace Blood on UA, occasional bacteria on UA   -No dysuria or hematuria   -no leukocytosis or fevers   -can repeat in am and monitor       d/w patient, psych RN and Nocturnist    ASSESSMENT: 61 y/o F with past medical, psychiatric and surgical history as mentioned above presents for inpatient psych admission for delusional disorder.     PLAN:     1. Delusional Disorder   -Patient started on Depakote by Psychiatry   -Further Medication adjustment as per primary psych team     2. HTN  -Home medication Lisinopril reordered for patient   -continue to monitor vitals     3. HLD  -Lipid panel reviewed   -no prior record of statin use  -Low dose statin ordered in light of cardiac history     4. Abnormal EKG (Non specific s-t wave abnormality, Possible left atrial enlargement)   -Left Atrial Enlargement consistent with Echo findings (12/2020): mildly dilated left atria. LV EF 60-65%   -similar findings on prior EKG in 12/2020   -patient has cardiac history   -originally complained of chest pain in ED, all troponin levels negative   -patient denies any chest pain, pressure or discomfort at this time     5. EtOH Abuse  -last drink was two days ago   -patient not presenting with any signs of withdrawal at this time   -CiWa score 0   -CiWA order set initiated   -multivitamin, Folic Acid and Thiamine     6. Smoking Hx   -Patient has Nicotine gum while inpatient     7. Hyperglycemia   -Blood glucose mildly elevated   -no history of DM   -A1c in am     8. Abnormal UA   -Trace Blood on UA, occasional bacteria on UA   -No dysuria or hematuria   -no leukocytosis or fevers         d/w patient, psych RN and Nocturnist

## 2021-02-05 NOTE — PROGRESS NOTE BEHAVIORAL HEALTH - NSBHFUPINTERVALHXFT_PSY_A_CORE
Pt with grandiose ideation and was still with some guarded suspious thoughts.  Claimed that theat she had side effectw fromall medication and wanting to remain on doxepin and her klonopin. Pt with some PRASHANTH and anxity Pt with grandiose ideation and was still with some guarded suspicious thoughts.  Claimed that she had side effects from all medication and wanting to remain on doxepin and her klonopin. Pt with some PRASHANTH and anxiety and wanting medical marijuana.  Spoke with the pt about lithium and depakote  .  Pt more interested in getting medical marijuana.   She denied any suicidal ideation intent or plan.

## 2021-02-05 NOTE — CONSULT NOTE ADULT - NSHPATTENDINGPLANDISCUSS_GEN_ALL_CORE
ALISTAIR Pereira in detail.  I agree with the above history, physical, and plan which I have reviewed and edited where appropriate.
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
## 2021-02-06 LAB
A1C WITH ESTIMATED AVERAGE GLUCOSE RESULT: 5 % — SIGNIFICANT CHANGE UP (ref 4–5.6)
APPEARANCE UR: CLEAR — SIGNIFICANT CHANGE UP
BACTERIA # UR AUTO: ABNORMAL
BILIRUB UR-MCNC: NEGATIVE — SIGNIFICANT CHANGE UP
COLOR SPEC: YELLOW — SIGNIFICANT CHANGE UP
DIFF PNL FLD: ABNORMAL
EPI CELLS # UR: SIGNIFICANT CHANGE UP
ESTIMATED AVERAGE GLUCOSE: 97 MG/DL — SIGNIFICANT CHANGE UP (ref 68–114)
GLUCOSE UR QL: NEGATIVE MG/DL — SIGNIFICANT CHANGE UP
KETONES UR-MCNC: NEGATIVE — SIGNIFICANT CHANGE UP
LEUKOCYTE ESTERASE UR-ACNC: NEGATIVE — SIGNIFICANT CHANGE UP
NITRITE UR-MCNC: NEGATIVE — SIGNIFICANT CHANGE UP
PH UR: 5 — SIGNIFICANT CHANGE UP (ref 5–8)
PROT UR-MCNC: 15 MG/DL
RBC CASTS # UR COMP ASSIST: SIGNIFICANT CHANGE UP /HPF (ref 0–4)
SP GR SPEC: 1.02 — SIGNIFICANT CHANGE UP (ref 1.01–1.02)
TSH SERPL-MCNC: 3.17 UU/ML — SIGNIFICANT CHANGE UP (ref 0.34–4.82)
UROBILINOGEN FLD QL: NEGATIVE MG/DL — SIGNIFICANT CHANGE UP
WBC UR QL: SIGNIFICANT CHANGE UP

## 2021-02-06 PROCEDURE — 99231 SBSQ HOSP IP/OBS SF/LOW 25: CPT

## 2021-02-06 RX ORDER — THIAMINE MONONITRATE (VIT B1) 100 MG
100 TABLET ORAL DAILY
Refills: 0 | Status: DISCONTINUED | OUTPATIENT
Start: 2021-02-06 | End: 2021-02-10

## 2021-02-06 RX ORDER — ATORVASTATIN CALCIUM 80 MG/1
10 TABLET, FILM COATED ORAL AT BEDTIME
Refills: 0 | Status: DISCONTINUED | OUTPATIENT
Start: 2021-02-06 | End: 2021-02-10

## 2021-02-06 RX ORDER — FOLIC ACID 0.8 MG
1 TABLET ORAL DAILY
Refills: 0 | Status: DISCONTINUED | OUTPATIENT
Start: 2021-02-06 | End: 2021-02-10

## 2021-02-06 RX ORDER — NICOTINE POLACRILEX 2 MG
1 GUM BUCCAL DAILY
Refills: 0 | Status: DISCONTINUED | OUTPATIENT
Start: 2021-02-06 | End: 2021-02-10

## 2021-02-06 RX ADMIN — Medication 2 MILLIGRAM(S): at 12:25

## 2021-02-06 RX ADMIN — Medication 2 MILLIGRAM(S): at 18:52

## 2021-02-06 RX ADMIN — Medication 2 MILLIGRAM(S): at 15:51

## 2021-02-06 RX ADMIN — Medication 0.5 MILLIGRAM(S): at 22:40

## 2021-02-06 RX ADMIN — Medication 0.5 MILLIGRAM(S): at 04:23

## 2021-02-06 RX ADMIN — Medication 100 MILLIGRAM(S): at 09:17

## 2021-02-06 RX ADMIN — Medication 1 TABLET(S): at 09:17

## 2021-02-06 RX ADMIN — Medication 0.5 MILLIGRAM(S): at 15:51

## 2021-02-06 RX ADMIN — Medication 2 MILLIGRAM(S): at 04:23

## 2021-02-06 RX ADMIN — Medication 1 PATCH: at 15:53

## 2021-02-06 RX ADMIN — Medication 50 MILLIGRAM(S): at 20:56

## 2021-02-06 RX ADMIN — Medication 1 MILLIGRAM(S): at 09:17

## 2021-02-06 RX ADMIN — Medication 2 MILLIGRAM(S): at 12:28

## 2021-02-06 RX ADMIN — Medication 2 MILLIGRAM(S): at 08:28

## 2021-02-06 RX ADMIN — LISINOPRIL 5 MILLIGRAM(S): 2.5 TABLET ORAL at 09:17

## 2021-02-06 RX ADMIN — Medication 1 PATCH: at 19:01

## 2021-02-06 RX ADMIN — ATORVASTATIN CALCIUM 10 MILLIGRAM(S): 80 TABLET, FILM COATED ORAL at 20:56

## 2021-02-06 NOTE — PROGRESS NOTE BEHAVIORAL HEALTH - NSBHFUPINTERVALHXFT_PSY_A_CORE
Pt with grandiose ideation and was still with some guarded suspicious thoughts.  Claimed that she had side effects from all medication and wanting to remain on doxepin and her Klonopin. Pt with some PRASHANTH and anxiety and wanting medical marijuana.  Spoke with the pt about lithium and Depakote.  Pt more interested in getting medical marijuana.  She denied any suicidal ideation intent or plan.    Covering MD  02/06/2021: Patient was seen today AM. Mood in control, in bed, endorsed that she was walking in the woods and saw 2 graves or bodies who were missing and she called police and thus she was later admitted due to mixing with drugs and ETOH. She is also fixated on Sinequan, was advised that its not feasible to start Sinequan at this time as it may tilt her to karo, and later she endorsed that she is planning to go home on Monday.

## 2021-02-07 PROCEDURE — 99231 SBSQ HOSP IP/OBS SF/LOW 25: CPT

## 2021-02-07 RX ORDER — LANOLIN ALCOHOL/MO/W.PET/CERES
3 CREAM (GRAM) TOPICAL AT BEDTIME
Refills: 0 | Status: DISCONTINUED | OUTPATIENT
Start: 2021-02-07 | End: 2021-02-10

## 2021-02-07 RX ADMIN — Medication 2 MILLIGRAM(S): at 09:18

## 2021-02-07 RX ADMIN — Medication 0.5 MILLIGRAM(S): at 09:16

## 2021-02-07 RX ADMIN — Medication 100 MILLIGRAM(S): at 09:16

## 2021-02-07 RX ADMIN — Medication 50 MILLIGRAM(S): at 21:56

## 2021-02-07 RX ADMIN — Medication 2 MILLIGRAM(S): at 22:00

## 2021-02-07 RX ADMIN — Medication 2 MILLIGRAM(S): at 19:35

## 2021-02-07 RX ADMIN — Medication 1 PATCH: at 15:53

## 2021-02-07 RX ADMIN — Medication 2 MILLIGRAM(S): at 13:39

## 2021-02-07 RX ADMIN — Medication 3 MILLIGRAM(S): at 21:57

## 2021-02-07 RX ADMIN — LISINOPRIL 5 MILLIGRAM(S): 2.5 TABLET ORAL at 09:16

## 2021-02-07 RX ADMIN — Medication 2 MILLIGRAM(S): at 16:00

## 2021-02-07 RX ADMIN — Medication 1 MILLIGRAM(S): at 09:16

## 2021-02-07 RX ADMIN — Medication 1 PATCH: at 09:37

## 2021-02-07 RX ADMIN — Medication 0.5 MILLIGRAM(S): at 17:20

## 2021-02-07 RX ADMIN — Medication 1 TABLET(S): at 09:16

## 2021-02-07 RX ADMIN — Medication 1 PATCH: at 21:54

## 2021-02-07 RX ADMIN — Medication 1 PATCH: at 09:17

## 2021-02-07 RX ADMIN — ATORVASTATIN CALCIUM 10 MILLIGRAM(S): 80 TABLET, FILM COATED ORAL at 21:55

## 2021-02-07 NOTE — PROGRESS NOTE BEHAVIORAL HEALTH - NSBHFUPINTERVALHXFT_PSY_A_CORE
Pt with grandiose ideation and was still with some guarded suspicious thoughts.  Claimed that she had side effects from all medication and wanting to remain on doxepin and her Klonopin. Pt with some PRASHANTH and anxiety and wanting medical marijuana.  Spoke with the pt about lithium and Depakote.  Pt more interested in getting medical marijuana.  She denied any suicidal ideation intent or plan.    Covering MD  02/06/2021: Patient was seen today AM. Mood in control, in bed, endorsed that she was walking in the woods and saw 2 graves or bodies who were missing and she called police and thus she was later admitted due to mixing with drugs and ETOH. She is also fixated on Sinequan, was advised that its not feasible to start Sinequan at this time as it may tilt her to karo, and later she endorsed that she is planning to go home on Monday.    02/07/2021; Patient was seen today AM. Mood in control, in bed, and not as grandiose , as delusional like yesterday. She refused to take the Depakote 250 mg BID as she endorses that she has restless Leg Syndrome, and writer tried to explain her that Depakote never causes RLS. She is also fixated to have a dose of Ativan as she endorses that it helps her to calm down. She also was advised again to have decreased dosage pf sinequan as it mat flip her to karo. She wanted to have something for sleep. melatonin 3 mg HS was added.

## 2021-02-07 NOTE — PROGRESS NOTE BEHAVIORAL HEALTH - NSBHCHARTREVIEWLAB_PSY_A_CORE FT
13.0   6.97  )-----------( 149      ( 04 Feb 2021 16:03 )             38.5     CBC Full  -  ( 04 Feb 2021 16:03 )  WBC Count : 6.97 K/uL  RBC Count : 3.86 M/uL  Hemoglobin : 13.0 g/dL  Hematocrit : 38.5 %  Platelet Count - Automated : 149 K/uL  Mean Cell Volume : 99.7 fl  Mean Cell Hemoglobin : 33.7 pg  Mean Cell Hemoglobin Concentration : 33.8 gm/dL  Auto Neutrophil # : 4.21 K/uL  Auto Lymphocyte # : 1.98 K/uL  Auto Monocyte # : 0.69 K/uL  Auto Eosinophil # : 0.04 K/uL  Auto Basophil # : 0.04 K/uL  Auto Neutrophil % : 60.4 %  Auto Lymphocyte % : 28.4 %  Auto Monocyte % : 9.9 %  Auto Eosinophil % : 0.6 %  Auto Basophil % : 0.6 %

## 2021-02-08 PROCEDURE — 99231 SBSQ HOSP IP/OBS SF/LOW 25: CPT

## 2021-02-08 RX ADMIN — ATORVASTATIN CALCIUM 10 MILLIGRAM(S): 80 TABLET, FILM COATED ORAL at 20:37

## 2021-02-08 RX ADMIN — Medication 2 MILLIGRAM(S): at 11:11

## 2021-02-08 RX ADMIN — LISINOPRIL 5 MILLIGRAM(S): 2.5 TABLET ORAL at 08:56

## 2021-02-08 RX ADMIN — Medication 100 MILLIGRAM(S): at 08:56

## 2021-02-08 RX ADMIN — Medication 1000 MILLIGRAM(S): at 21:42

## 2021-02-08 RX ADMIN — Medication 1 PATCH: at 08:55

## 2021-02-08 RX ADMIN — Medication 1 TABLET(S): at 08:56

## 2021-02-08 RX ADMIN — Medication 0.5 MILLIGRAM(S): at 16:59

## 2021-02-08 RX ADMIN — Medication 50 MILLIGRAM(S): at 20:37

## 2021-02-08 RX ADMIN — Medication 3 MILLIGRAM(S): at 20:37

## 2021-02-08 RX ADMIN — Medication 0.5 MILLIGRAM(S): at 08:53

## 2021-02-08 RX ADMIN — Medication 1 MILLIGRAM(S): at 08:56

## 2021-02-08 RX ADMIN — Medication 2 MILLIGRAM(S): at 20:38

## 2021-02-08 NOTE — PROGRESS NOTE BEHAVIORAL HEALTH - NSBHFUPINTERVALHXFT_PSY_A_CORE
Pt insisting that she not go back to [facility] "someone is out to get me" . Pt offered to go to her niece's home but she claimed she did not recall the address and claimed " if you get me a cab and go  to the corner of the  cemetery I can start calling out and if the dogs would start barking I would know where the house is."  Pt is forgoing the safety of a stable place to live to try to go to an address of unknown locale and that may not exist..    Pt no taking any depakote   Pt with paranoid delusion and illogical thoughts which  compromise her decision making. Pt insisting that she not go back to [facility] "someone is out to get me" . Pt offered to go to her niece's home but she claimed she did not recall the address and claimed " if you get me a cab and go  to the corner of the  cemetery I can start calling out and if the dogs would start barking I would know where the house is."  Pt is forgoing the safety of a stable place to live to try to go to an address of unknown locale and that may not exist..   Pt still claiming that she should be discharged today . Need to contact the therapist as some of the pts's delusional thoughts are chronic.    Pt no taking any depakote   Pt with paranoid delusion and illogical thoughts which  compromise her decision making.

## 2021-02-09 PROCEDURE — 99232 SBSQ HOSP IP/OBS MODERATE 35: CPT

## 2021-02-09 RX ADMIN — Medication 2 MILLIGRAM(S): at 08:26

## 2021-02-09 RX ADMIN — Medication 3 MILLIGRAM(S): at 21:15

## 2021-02-09 RX ADMIN — Medication 1 PATCH: at 09:58

## 2021-02-09 RX ADMIN — Medication 2 MILLIGRAM(S): at 17:10

## 2021-02-09 RX ADMIN — Medication 1 PATCH: at 09:45

## 2021-02-09 RX ADMIN — Medication 1 MILLIGRAM(S): at 09:43

## 2021-02-09 RX ADMIN — Medication 2 MILLIGRAM(S): at 19:49

## 2021-02-09 RX ADMIN — Medication 650 MILLIGRAM(S): at 08:24

## 2021-02-09 RX ADMIN — Medication 2 MILLIGRAM(S): at 23:54

## 2021-02-09 RX ADMIN — Medication 0.5 MILLIGRAM(S): at 09:58

## 2021-02-09 RX ADMIN — Medication 2 MILLIGRAM(S): at 13:49

## 2021-02-09 RX ADMIN — Medication 0.5 MILLIGRAM(S): at 17:10

## 2021-02-09 RX ADMIN — Medication 2 MILLIGRAM(S): at 11:19

## 2021-02-09 RX ADMIN — LISINOPRIL 5 MILLIGRAM(S): 2.5 TABLET ORAL at 09:44

## 2021-02-09 RX ADMIN — Medication 1 TABLET(S): at 09:44

## 2021-02-09 RX ADMIN — Medication 1 PATCH: at 21:11

## 2021-02-09 RX ADMIN — Medication 100 MILLIGRAM(S): at 09:43

## 2021-02-09 NOTE — PROGRESS NOTE BEHAVIORAL HEALTH - THOUGHT CONTENT
Delusions/Obsessions/Ruminations/Other

## 2021-02-09 NOTE — PROGRESS NOTE BEHAVIORAL HEALTH - NSBHCHARTREVIEWINVESTIGATE_PSY_A_CORE FT
QRS axis to [] ° and NSR at a rate of [] BPM. There was no atrial enlargement. There was no ventricular hypertrophy. There were no ST-T changes and all intervals were normal.

## 2021-02-09 NOTE — PROGRESS NOTE BEHAVIORAL HEALTH - SECONDARY DX2
Sedative hypnotic or anxiolytic dependence

## 2021-02-09 NOTE — PROGRESS NOTE BEHAVIORAL HEALTH - NSBHFUPINTERVALCCFT_PSY_A_CORE
"I have been helping the police with their investigation of serial killers. "
"I have been helping the police with their investigation of serial killers. "
"I want the medical marijuana"

## 2021-02-09 NOTE — PROGRESS NOTE BEHAVIORAL HEALTH - SUMMARY
This is a 60 year old single, unemployed,  female, non-caregiver, domiciled in [place] apartment, alcohol abuse (1-3 bottles of wine daily, history of withdrawals but no seizures), history of benzodiazepine misuse (Xanax, Valium), history bipolar 2, several inpatient hospitalizations (most recently 9/16), one prior suicide attempt by overdose in 2012, reported axis II - histrionic traits, currently in treatment with a psychiatrist and therapist (on doxepin 100 mg and klonopin 0.5 mg tid prn), no hx of violence, no hx of arrests, self presenting per recommendation of her therapist who was concerned about possible decompensation. Pt presents with mood lability, tearfulness, acute anxiety/distress, grandiose and paranoid ideations ie having inherited large sum of money, believing people are going to kill her, believing she recently encountered the graves of two dead children, etc. Collateral from her therapist is concerning that pt was "missing" and unreachable for several days which is not typical for her. She would benefit from inpatient psych admission for acute stabilization, medication assessment, which pt is amenable to - VOL legal status.
This is a 60 year old single, unemployed,  female, non-caregiver, domiciled in [employer] apartment, alcohol abuse (1-3 bottles of wine daily, history of withdrawals but no seizures), history of benzodiazepine misuse (Xanax, Valium), history bipolar 2, several inpatient hospitalizations (most recently 9/16), one prior suicide attempt by overdose in 2012, reported axis II - histrionic traits, currently in treatment with a psychiatrist and therapist (on doxepin 100 mg and klonopin 0.5 mg tid prn), no hx of violence, no hx of arrests, self presenting per recommendation of her therapist who was concerned about possible decompensation. Pt presents with mood lability, tearfulness, acute anxiety/distress, grandiose and paranoid ideations ie having inherited large sum of money, believing people are going to kill her, believing she recently encountered the graves of two dead children, etc. Collateral from her therapist is concerning that pt was "missing" and unreachable for several days which is not typical for her. She would benefit from inpatient psych admission for acute stabilization, medication assessment, which pt is amenable to - VOL legal status.
This is a 60 year old single, unemployed,  female, non-caregiver, domiciled in [employer] apartment, alcohol abuse (1-3 bottles of wine daily, history of withdrawals but no seizures), history of benzodiazepine misuse (Xanax, Valium), history bipolar 2, several inpatient hospitalizations (most recently 9/16), one prior suicide attempt by overdose in 2012, reported axis II - histrionic traits, currently in treatment with a psychiatrist and therapist (on doxepin 100 mg and klonopin 0.5 mg tid prn), no hx of violence, no hx of arrests, self presenting per recommendation of her therapist who was concerned about possible decompensation. Pt presents with mood lability, tearfulness, acute anxiety/distress, grandiose and paranoid ideations ie having inherited large sum of money, believing people are going to kill her, believing she recently encountered the graves of two dead children, etc. Collateral from her therapist is concerning that pt was "missing" and unreachable for several days which is not typical for her. She would benefit from inpatient psych admission for acute stabilization, medication assessment, which pt is amenable to - VOL legal status.
This is a 60 year old single, unemployed,  female, non-caregiver, domiciled in [place] apartment, alcohol abuse (1-3 bottles of wine daily, history of withdrawals but no seizures), history of benzodiazepine misuse (Xanax, Valium), history bipolar 2, several inpatient hospitalizations (most recently 9/16), one prior suicide attempt by overdose in 2012, reported axis II - histrionic traits, currently in treatment with a psychiatrist and therapist (on doxepin 100 mg and klonopin 0.5 mg tid prn), no hx of violence, no hx of arrests, self presenting per recommendation of her therapist who was concerned about possible decompensation. Pt presents with mood lability, tearfulness, acute anxiety/distress, grandiose and paranoid ideations ie having inherited large sum of money, believing people are going to kill her, believing she recently encountered the graves of two dead children, etc. Collateral from her therapist is concerning that pt was "missing" and unreachable for several days which is not typical for her. She would benefit from inpatient psych admission for acute stabilization, medication assessment, which pt is amenable to - VOL legal status.

## 2021-02-09 NOTE — PROGRESS NOTE BEHAVIORAL HEALTH - NSBHADMITIPOBSFT_PSY_A_CORE
pt denies any suicidal ideation intent or plan

## 2021-02-09 NOTE — PROGRESS NOTE BEHAVIORAL HEALTH - AXIS III
heart disease, htn, chronic back pain

## 2021-02-09 NOTE — PROGRESS NOTE BEHAVIORAL HEALTH - PROBLEM SELECTOR PROBLEM 1
Bipolar affective disorder

## 2021-02-09 NOTE — PROGRESS NOTE BEHAVIORAL HEALTH - NSBHFUPINTERVALHXFT_PSY_A_CORE
Spoke with the pt's therapist who indicated that the pt is chronically with grandiose delusions of helping the FBI in "investigating serial killers" and that she inherited a large sum of money from her  and that the rest of the family is after her for money. . However the therapist was more concerned as the pt was even more impulsive by leaving her residence and was attempting to meet up with a doctor who she believed was willing to give her a face lift.   Doxepin is being tapered off .   Pt denies any suicidal ideation intent or plan

## 2021-02-09 NOTE — PROGRESS NOTE BEHAVIORAL HEALTH - NSBHFUPTYPE_PSY_A_CORE
Action 4: Continue
Hide Differin Products: No
Increase Regimen: Spironolactone 100mg daily
Continue Regimen: Clindamycin solution daily to face
Inpatient
Continue Regimen: Fluocinonide solution 0.05% daily to scalp
Inpatient
Detail Level: Zone
Inpatient-On Service Note
Detail Level: Simple
Plan: treat acne first then discuss options for acne scars
Inpatient
Inpatient

## 2021-02-09 NOTE — PROGRESS NOTE BEHAVIORAL HEALTH - RISK ASSESSMENT
moderate risk  Acute Impulsivity, Global insomnia,  anxiety, irritable or panic  mitigating: pt denies any suicidal ideation intent or plan   protective: pt with stable place to live, support of professional staff at [facility]  chronic: pt with prior psych hosp, substance use , alcohol
moderate risk  Acute Impulsivity, Global insomnia,  anxiety, irritable or panic  mitigating: pt denies any suicidal ideation intent or plan   protective: pt with stable place to live, support of professional staff at [facility]  chronic: pt with prior psych hosp, substance use , alcohol
low risk  Acute Impulsivity, able to sleep, decreased anxiety, irritable or panic  mitigating: pt denies any suicidal ideation intent or plan   protective: pt with stable place to live, support of professional staff at [facility]  chronic: pt with prior psych hosp, substance use , alcohol
moderate risk  Acute Impulsivity, Global insomnia,  anxiety, irritable or panic  mitigating: pt denies any suicidal ideation intent or plan   protective: pt with stable place to live, support of professional staff at [facility]  chronic: pt with prior psych hosp, substance use , alcohol

## 2021-02-09 NOTE — PROGRESS NOTE BEHAVIORAL HEALTH - NSBHCHARTREVIEWVS_PSY_A_CORE FT
Vital Signs Last 24 Hrs  T(C): 36.4 (09 Feb 2021 07:47), Max: 36.4 (09 Feb 2021 07:47)  T(F): 97.6 (09 Feb 2021 07:47), Max: 97.6 (09 Feb 2021 07:47)  HR: --  BP: --  BP(mean): --  RR: 14 (09 Feb 2021 07:47) (14 - 14)  SpO2: 100% (09 Feb 2021 07:47) (100% - 100%)
Vital Signs Last 24 Hrs  T(C): 36.6 (05 Feb 2021 09:36), Max: 37.1 (04 Feb 2021 15:46)  T(F): 97.9 (05 Feb 2021 09:36), Max: 98.8 (04 Feb 2021 15:46)  HR: 75 (05 Feb 2021 03:00) (72 - 98)  BP: 143/89 (05 Feb 2021 03:00) (129/92 - 160/88)  BP(mean): 110 (04 Feb 2021 20:20) (110 - 110)  RR: 12 (05 Feb 2021 09:36) (12 - 18)  SpO2: 100% (05 Feb 2021 09:36) (97% - 100%)
Vital Signs Last 24 Hrs  T(C): 36.3 (08 Feb 2021 07:52), Max: 36.7 (07 Feb 2021 20:19)  T(F): 97.4 (08 Feb 2021 07:52), Max: 98 (07 Feb 2021 20:19)  HR: 72 (07 Feb 2021 20:19) (72 - 72)  BP: 146/79 (07 Feb 2021 20:19) (146/79 - 146/79)  BP(mean): --  RR: 14 (08 Feb 2021 07:52) (14 - 18)  SpO2: 100% (08 Feb 2021 07:52) (100% - 100%)
Vital Signs Last 24 Hrs  T(C): 36.4 (06 Feb 2021 12:29), Max: 36.7 (05 Feb 2021 21:34)  T(F): 97.6 (06 Feb 2021 12:29), Max: 98.1 (05 Feb 2021 21:34)  HR: 69 (06 Feb 2021 04:42) (69 - 83)  BP: 150/89 (06 Feb 2021 04:42) (122/61 - 163/94)  BP(mean): --  RR: 16 (06 Feb 2021 12:29) (16 - 20)  SpO2: 99% (06 Feb 2021 12:29) (97% - 100%)
Vital Signs Last 24 Hrs  T(C): 36.9 (07 Feb 2021 11:45), Max: 36.9 (07 Feb 2021 11:45)  T(F): 98.4 (07 Feb 2021 11:45), Max: 98.4 (07 Feb 2021 11:45)  HR: 67 (07 Feb 2021 06:00) (67 - 106)  BP: 132/86 (07 Feb 2021 06:00) (114/81 - 148/89)  BP(mean): 90 (06 Feb 2021 17:04) (90 - 90)  RR: 18 (07 Feb 2021 11:45) (16 - 18)  SpO2: 99% (07 Feb 2021 11:45) (99% - 100%)

## 2021-02-10 VITALS — TEMPERATURE: 98 F | OXYGEN SATURATION: 100 % | RESPIRATION RATE: 18 BRPM

## 2021-02-10 RX ORDER — NICOTINE POLACRILEX 2 MG
1 GUM BUCCAL
Qty: 15 | Refills: 1
Start: 2021-02-10 | End: 2021-03-11

## 2021-02-10 RX ORDER — LISINOPRIL 2.5 MG/1
1 TABLET ORAL
Qty: 15 | Refills: 1
Start: 2021-02-10 | End: 2021-03-11

## 2021-02-10 RX ORDER — LANOLIN ALCOHOL/MO/W.PET/CERES
1 CREAM (GRAM) TOPICAL
Qty: 15 | Refills: 1
Start: 2021-02-10 | End: 2021-03-11

## 2021-02-10 RX ORDER — ATORVASTATIN CALCIUM 80 MG/1
1 TABLET, FILM COATED ORAL
Qty: 15 | Refills: 1
Start: 2021-02-10 | End: 2021-03-11

## 2021-02-10 RX ORDER — CETIRIZINE HYDROCHLORIDE 10 MG/1
1 TABLET ORAL
Qty: 0 | Refills: 0 | DISCHARGE

## 2021-02-10 RX ORDER — CHOLECALCIFEROL (VITAMIN D3) 125 MCG
1 CAPSULE ORAL
Qty: 0 | Refills: 0 | DISCHARGE

## 2021-02-10 RX ORDER — NICOTINE POLACRILEX 2 MG
1 GUM BUCCAL
Qty: 60 | Refills: 1
Start: 2021-02-10 | End: 2021-03-11

## 2021-02-10 RX ORDER — LISINOPRIL 2.5 MG/1
1 TABLET ORAL
Qty: 0 | Refills: 0 | DISCHARGE

## 2021-02-10 RX ORDER — ASPIRIN/CALCIUM CARB/MAGNESIUM 324 MG
1 TABLET ORAL
Qty: 15 | Refills: 1
Start: 2021-02-10 | End: 2021-03-11

## 2021-02-10 RX ORDER — CLONAZEPAM 1 MG
1 TABLET ORAL
Qty: 0 | Refills: 0 | DISCHARGE

## 2021-02-10 RX ORDER — ASPIRIN/CALCIUM CARB/MAGNESIUM 324 MG
4 TABLET ORAL
Qty: 0 | Refills: 0 | DISCHARGE

## 2021-02-10 RX ORDER — DOXEPIN HCL 100 MG
2 CAPSULE ORAL
Qty: 0 | Refills: 0 | DISCHARGE

## 2021-02-10 RX ORDER — CLONAZEPAM 1 MG
1 TABLET ORAL
Qty: 45 | Refills: 0
Start: 2021-02-10 | End: 2021-02-24

## 2021-02-10 RX ADMIN — Medication 2 MILLIGRAM(S): at 09:16

## 2021-02-10 RX ADMIN — Medication 1 TABLET(S): at 09:19

## 2021-02-10 RX ADMIN — Medication 2 MILLIGRAM(S): at 12:34

## 2021-02-10 RX ADMIN — Medication 1 PATCH: at 09:19

## 2021-02-10 RX ADMIN — Medication 1 MILLIGRAM(S): at 09:16

## 2021-02-10 RX ADMIN — Medication 0.5 MILLIGRAM(S): at 01:25

## 2021-02-10 RX ADMIN — Medication 0.5 MILLIGRAM(S): at 09:15

## 2021-02-10 RX ADMIN — LISINOPRIL 5 MILLIGRAM(S): 2.5 TABLET ORAL at 09:16

## 2021-02-10 RX ADMIN — Medication 650 MILLIGRAM(S): at 10:54

## 2021-02-10 RX ADMIN — Medication 1 PATCH: at 09:20

## 2021-02-10 RX ADMIN — Medication 100 MILLIGRAM(S): at 09:16

## 2021-02-10 NOTE — DISCHARGE NOTE BEHAVIORAL HEALTH - HPI (INCLUDE ILLNESS QUALITY, SEVERITY, DURATION, TIMING, CONTEXT, MODIFYING FACTORS, ASSOCIATED SIGNS AND SYMPTOMS)
Per pt's ED BH Assessment completed on 21 by Dr. Jannette Wu: "This is a 60 year old single, unemployed,  female, non-caregiver, domiciled in [employer] apartment, alcohol abuse (1-3 bottles of wine daily, history of withdrawals but no seizures), history of benzodiazepine misuse (Xanax, Valium), history bipolar 2, several inpatient hospitalizations (most recently ), one prior suicide attempt by overdose in , reported axis II - histrionic traits, currently in treatment with a psychiatrist and therapist (on doxepin 100 mg and klonopin 0.5 mg tid prn), no hx of violence, no hx of arrests, self presenting per recommendation of her therapist who was concerned about possible decompensation.    Pt is very tearful and anxious during interview, reports that she is "not well," feels unsafe because "they're trying to kill me" regarding her late 's family who is apparently after her "huge inheritance." She is tangential, talking about her brother who  in May 2020 and finally cremated recently, but "de Blasio won't let me have a  so I want to kill him." She does endorse poor sleep for past several days. She denies SI, denies AVH.     Pt reports alcohol use on regular basis, last drink the day before yesterday, "I had a couple beers." Reports on average she will have at least "several glasses of wine with dinner," but often more. She denies seizures in past. Endorses daily medical marijuana use, "most of the day," however has run out of her prescription in past week.     Therapist Sarah [phone]. Last spoke this past afternoon.  Psychiatrist Dr. Vázquez, last spoke about one week ago -- rx Klonopin 0.5 mg TID, sinequan 100 mg.    COVID screening:  pt was tested for covid about two weeks ago - resulted negative.  pt denies travel outside [facility] in past 10 days.  pt denies contact with COVID+ person/s in past 10 days.      ---    Collateral from Therapist Sarah [phone].   Cannabis use disorder, alcohol use disorder, stimulant use in remission, bipolar 2. Has been on doxepin and klonopin. 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 She went missing for several days and unreachable by providers. [place] from pt finally today from location other than her home which surprised therapist. Reportedly was trying to find her nieces in [place]. Reportedly "doctor friend" was giving her a free face lift. Believed people were trying to kill her near her home.    Covid screening from collateral:  COVID test in past 21 days: unknown.  Travel outside of [facility] in past 10 days: unlikely.  Encounter with COVID+ person/s in past 10 days: unknown."

## 2021-02-10 NOTE — DISCHARGE NOTE BEHAVIORAL HEALTH - MEDICATION SUMMARY - MEDICATIONS TO STOP TAKING
I will STOP taking the medications listed below when I get home from the hospital:    cloNIDine 0.1 mg oral tablet  -- 1 tab(s) by mouth once a day, As Needed  -- It is very important that you take or use this exactly as directed.  Do not skip doses or discontinue unless directed by your doctor.  May cause drowsiness.  Alcohol may intensify this effect.  Use care when operating dangerous machinery.  Some non-prescription drugs may aggravate your condition.  Read all labels carefully.  If a warning appears, check with your doctor before taking.

## 2021-02-10 NOTE — DISCHARGE NOTE BEHAVIORAL HEALTH - NSBHDCALCOHOLREFERFT_PSY_A_CORE
Patient to address issues related to substance abuse in outpatient treatment at [facility].

## 2021-02-10 NOTE — DISCHARGE NOTE BEHAVIORAL HEALTH - NSBHDCRESOURCESOTHERFT_PSY_A_CORE
[facility]- for psychiatric crises (available AFTER HOURS)  24/7 hotline: [phone]  Address:  [address], [place] [address]

## 2021-02-10 NOTE — DISCHARGE NOTE BEHAVIORAL HEALTH - NSBHDCCRISISPLAN1FT_PSY_A_CORE
Tell a trusted friend/family member, tell housing staff, tell clinic staff, call a crisis line ( Crisis Center ph. [phone], [facility] DASH [phone], [facility] (peer support) ph. [phone]), return to the nearest emergency room. You may call 9-1-1 for assistance.

## 2021-02-10 NOTE — DISCHARGE NOTE BEHAVIORAL HEALTH - NSBHDCRESPONSEFT_PSY_A_CORE
pt refused any mood and affect stabilizing medication and also refused any psychic medication aside from doxepin ativan klonopin.  Had tapered off the doxepin and the pt seems less impulsive.

## 2021-02-10 NOTE — DISCHARGE NOTE BEHAVIORAL HEALTH - NSBHDCSWCOMMENTSFT_PSY_A_CORE
Pt was educated on the importance of taking medications as prescribed and to not stop or miss any doses unless directed to do so by the prescribing provider. Pt was counseled on the importance of attending outpatient appointments. Pt was made aware of crisis resources to use after hours as needed. Pt was educated regarding safety precautions for COVID-19 including hand hygiene, wearing a mask, and maintaining social distancing. Pt provided with information on symptoms of COVID 19 prior to discharge, and educated to seek testing in the event that any symptoms occur.

## 2021-02-10 NOTE — DISCHARGE NOTE BEHAVIORAL HEALTH - MEDICATION SUMMARY - MEDICATIONS TO TAKE
I will START or STAY ON the medications listed below when I get home from the hospital:    Adult Aspirin 325 mg oral tablet  -- 1 tab(s) by mouth once a day   -- Indication: For prophylaxis for CVD    lisinopril 5 mg oral tablet  -- 1 tab(s) by mouth once a day  -- Indication: For CVD    clonazePAM 0.5 mg oral tablet  -- 1 tab(s) by mouth every 8 hours, As needed, anxiety MDD:1.5mg  -- Indication: For Anxiety    atorvastatin 10 mg oral tablet  -- 1 tab(s) by mouth once a day (at bedtime)  -- Indication: For Hyperlipid    melatonin 3 mg oral tablet  -- 1 tab(s) by mouth once a day (at bedtime)  -- Indication: For insomnia    nicotine 7 mg/24 hr transdermal film, extended release  -- 1 patch by transdermal patch once a day   -- Indication: For nicotine replacement    nicotine 2 mg oral transmucosal lozenge  -- 1 lozenge oral transmucosal every 2 hours  -- Indication: For nicotine replacement

## 2021-02-10 NOTE — DISCHARGE NOTE BEHAVIORAL HEALTH - NSTOBACCOREFERRAL_GEN_A_CS
Yes Patient declined information Patient declined referral on admission and discharge/Patient declined information

## 2021-02-10 NOTE — DISCHARGE NOTE BEHAVIORAL HEALTH - CONDITIONS AT DISCHARGE
Patient alert, oriented x3. Patient denies any thoughts to self harm or others. Therapist and nurse reviewed safety plan with patient. SW and nurse reviewed discharge plan with patient who is in agreement with plan. Patient received a copy of her discharge instructions. All belongings returned to patient.

## 2021-02-10 NOTE — DISCHARGE NOTE BEHAVIORAL HEALTH - PAST PSYCHIATRIC HISTORY
Per pt's 5N hx, pt with a PPH of self-reported dx of bipolar disorder with delusions of grandeur and anxiety. Per chart, pt with hx of ETOH abuse, bipolar 2 disorder, axis II histrionic traits, with multiple inpatient admissions, most recently [facility] 3 years ago and [facility] psych unit years ago after SA via overdose. Per chart, last psych admission Sept. 2016.

## 2021-02-10 NOTE — DISCHARGE NOTE BEHAVIORAL HEALTH - NSBHDCREFEROTHERFT_PSY_A_CORE
[facility]- for psychiatric crises (available AFTER HOURS)  24/7 hotline: [phone]  Address:  [address] Crisis Center: [phone]  *Walk In Crisis Center for psychiatric needs. Open 9am-5pm, M-F.    [facility]  *Can meet online   [email or web address]     Find a local AA group:  [facility]  [email or web address]  [phone]    [phone] ([phone]) or [email or web address]

## 2021-02-10 NOTE — DISCHARGE NOTE BEHAVIORAL HEALTH - NSBHDCPURPOSE1FT_PSY_A_CORE
Outpatient mental health treatment. You have a phone appointment with your therapist Sarah ([phone]) on 2/16/21 at 1PM.

## 2021-02-10 NOTE — DISCHARGE NOTE BEHAVIORAL HEALTH - MEDICATION SUMMARY - MEDICATIONS TO CHANGE
I will SWITCH the dose or number of times a day I take the medications listed below when I get home from the hospital:    KlonoPIN 0.5 mg oral tablet  -- 1 tab(s) by mouth 3 times a day

## 2021-02-10 NOTE — DISCHARGE NOTE BEHAVIORAL HEALTH - NSBHDCSUBSTHXFT_PSY_A_CORE
Per 5N hx, pt endorsing active ETOH abuse (1-3 bottles of wine daily vs several glasses of wine with dinner), hx of withdrawals but no seizures, endorsed daily medical marijuana use, hx of benzo misuse (xanax and valium), remote hx of cocaine use, hx of inpatient treatment, Utox positive for THC. Per 5N hx, pt endorsing active ETOH abuse (1-3 bottles of wine daily vs several glasses of wine with dinner), hx of withdrawals but no seizures, endorsed daily medical marijuana use, hx of benzo misuse (xanax and valium), remote hx of cocaine use, hx of inpatient treatment, Utox positive for THC.  alcohol abuse (1-3 bottles of wine daily, history of withdrawals but no seizures), history of benzodiazepine misuse (Xanax, Valium),  Cannabis use disorder daily medical marijuana use, "most of the day," however has run out of her prescription in past week.   , alcohol use disorder, stimulant use in remission  substance treatment in past  vaping less than one JUUL cartridge daily  Cannabis; Cocaine/Crack  daily medical marijuana

## 2021-02-10 NOTE — DISCHARGE NOTE BEHAVIORAL HEALTH - DISCHARGE DATE
10-Feb-2021 no burning urination/no abdominal distension/no diarrhea/no fever/no vomiting/no nausea/no blood in stool/no chills/no hematuria

## 2021-02-10 NOTE — DISCHARGE NOTE BEHAVIORAL HEALTH - NSBHDCADDFT_PSY_A_CORE
HgA1c   02-05 Chol 214<H> LDL -- HDL 42<L> Trig 122  QRS axis to [] ° and NSR at a rate of [] BPM. There was no atrial enlargement. There was no ventricular hypertrophy. There were no ST-T changes and all intervals were normal.

## 2021-02-10 NOTE — DISCHARGE NOTE BEHAVIORAL HEALTH - NSBHDCCRISISPROB1FT_PSY_A_CORE
The return of or any worsening symptoms that you had prior to hospitalization, and/or any thoughts to harm/kill yourself or others.

## 2021-02-10 NOTE — DISCHARGE NOTE BEHAVIORAL HEALTH - NSBHDCCRISISPLAN3FT_PSY_A_CORE
Discuss in treatment with counselor, attended a local/online self-help group, call a hotline ([facility] ([facility]stance Abuse [facility])[phone])

## 2021-02-18 DIAGNOSIS — F43.10 POST-TRAUMATIC STRESS DISORDER, UNSPECIFIED: ICD-10-CM

## 2021-02-18 DIAGNOSIS — F10.10 ALCOHOL ABUSE, UNCOMPLICATED: ICD-10-CM

## 2021-02-18 DIAGNOSIS — F31.9 BIPOLAR DISORDER, UNSPECIFIED: ICD-10-CM

## 2021-02-18 DIAGNOSIS — I51.7 CARDIOMEGALY: ICD-10-CM

## 2021-02-18 DIAGNOSIS — R73.9 HYPERGLYCEMIA, UNSPECIFIED: ICD-10-CM

## 2021-02-18 DIAGNOSIS — I15.9 SECONDARY HYPERTENSION, UNSPECIFIED: ICD-10-CM

## 2021-02-18 DIAGNOSIS — G47.00 INSOMNIA, UNSPECIFIED: ICD-10-CM

## 2021-02-18 DIAGNOSIS — I25.10 ATHEROSCLEROTIC HEART DISEASE OF NATIVE CORONARY ARTERY WITHOUT ANGINA PECTORIS: ICD-10-CM

## 2021-02-18 DIAGNOSIS — F41.9 ANXIETY DISORDER, UNSPECIFIED: ICD-10-CM

## 2021-02-18 DIAGNOSIS — Z79.899 OTHER LONG TERM (CURRENT) DRUG THERAPY: ICD-10-CM

## 2021-02-18 DIAGNOSIS — Z90.49 ACQUIRED ABSENCE OF OTHER SPECIFIED PARTS OF DIGESTIVE TRACT: ICD-10-CM

## 2021-02-18 DIAGNOSIS — F31.81 BIPOLAR II DISORDER: ICD-10-CM

## 2021-02-18 DIAGNOSIS — Z91.010 ALLERGY TO PEANUTS: ICD-10-CM

## 2021-02-18 DIAGNOSIS — G89.29 OTHER CHRONIC PAIN: ICD-10-CM

## 2021-02-18 DIAGNOSIS — Z95.5 PRESENCE OF CORONARY ANGIOPLASTY IMPLANT AND GRAFT: ICD-10-CM

## 2021-02-18 DIAGNOSIS — Z88.5 ALLERGY STATUS TO NARCOTIC AGENT: ICD-10-CM

## 2021-02-18 DIAGNOSIS — F13.20 SEDATIVE, HYPNOTIC OR ANXIOLYTIC DEPENDENCE, UNCOMPLICATED: ICD-10-CM

## 2021-02-18 DIAGNOSIS — Z56.0 UNEMPLOYMENT, UNSPECIFIED: ICD-10-CM

## 2021-02-18 DIAGNOSIS — Z20.822 CONTACT WITH AND (SUSPECTED) EXPOSURE TO COVID-19: ICD-10-CM

## 2021-02-18 DIAGNOSIS — E78.5 HYPERLIPIDEMIA, UNSPECIFIED: ICD-10-CM

## 2021-02-18 DIAGNOSIS — Z91.018 ALLERGY TO OTHER FOODS: ICD-10-CM

## 2021-02-18 DIAGNOSIS — I10 ESSENTIAL (PRIMARY) HYPERTENSION: ICD-10-CM

## 2021-02-18 DIAGNOSIS — F22 DELUSIONAL DISORDERS: ICD-10-CM

## 2021-02-18 DIAGNOSIS — M54.9 DORSALGIA, UNSPECIFIED: ICD-10-CM

## 2021-02-18 DIAGNOSIS — Z91.5 PERSONAL HISTORY OF SELF-HARM: ICD-10-CM

## 2021-02-18 DIAGNOSIS — F17.210 NICOTINE DEPENDENCE, CIGARETTES, UNCOMPLICATED: ICD-10-CM

## 2021-02-18 SDOH — ECONOMIC STABILITY - INCOME SECURITY: UNEMPLOYMENT, UNSPECIFIED: Z56.0

## 2021-04-04 ENCOUNTER — EMERGENCY (EMERGENCY)
Facility: HOSPITAL | Age: 61
LOS: 0 days | Discharge: HOME | End: 2021-04-04
Attending: EMERGENCY MEDICINE
Payer: MEDICAID

## 2021-04-04 VITALS
OXYGEN SATURATION: 100 % | HEART RATE: 110 BPM | HEIGHT: 64 IN | TEMPERATURE: 98 F | SYSTOLIC BLOOD PRESSURE: 137 MMHG | WEIGHT: 164.91 LBS | DIASTOLIC BLOOD PRESSURE: 91 MMHG | RESPIRATION RATE: 19 BRPM

## 2021-04-04 VITALS
DIASTOLIC BLOOD PRESSURE: 76 MMHG | HEART RATE: 100 BPM | TEMPERATURE: 97 F | RESPIRATION RATE: 18 BRPM | SYSTOLIC BLOOD PRESSURE: 114 MMHG | OXYGEN SATURATION: 97 %

## 2021-04-04 DIAGNOSIS — Z87.42 PERSONAL HISTORY OF OTHER DISEASES OF THE FEMALE GENITAL TRACT: Chronic | ICD-10-CM

## 2021-04-04 DIAGNOSIS — Z98.890 OTHER SPECIFIED POSTPROCEDURAL STATES: Chronic | ICD-10-CM

## 2021-04-04 DIAGNOSIS — K27.5 CHRONIC OR UNSPECIFIED PEPTIC ULCER, SITE UNSPECIFIED, WITH PERFORATION: Chronic | ICD-10-CM

## 2021-04-04 DIAGNOSIS — Z98.62 PERIPHERAL VASCULAR ANGIOPLASTY STATUS: Chronic | ICD-10-CM

## 2021-04-04 DIAGNOSIS — R00.0 TACHYCARDIA, UNSPECIFIED: ICD-10-CM

## 2021-04-04 DIAGNOSIS — Z88.0 ALLERGY STATUS TO PENICILLIN: ICD-10-CM

## 2021-04-04 DIAGNOSIS — R07.89 OTHER CHEST PAIN: ICD-10-CM

## 2021-04-04 DIAGNOSIS — Z95.5 PRESENCE OF CORONARY ANGIOPLASTY IMPLANT AND GRAFT: ICD-10-CM

## 2021-04-04 DIAGNOSIS — I10 ESSENTIAL (PRIMARY) HYPERTENSION: ICD-10-CM

## 2021-04-04 DIAGNOSIS — I25.10 ATHEROSCLEROTIC HEART DISEASE OF NATIVE CORONARY ARTERY WITHOUT ANGINA PECTORIS: ICD-10-CM

## 2021-04-04 DIAGNOSIS — F43.10 POST-TRAUMATIC STRESS DISORDER, UNSPECIFIED: ICD-10-CM

## 2021-04-04 DIAGNOSIS — Z91.018 ALLERGY TO OTHER FOODS: ICD-10-CM

## 2021-04-04 DIAGNOSIS — F41.9 ANXIETY DISORDER, UNSPECIFIED: ICD-10-CM

## 2021-04-04 DIAGNOSIS — Z90.49 ACQUIRED ABSENCE OF OTHER SPECIFIED PARTS OF DIGESTIVE TRACT: Chronic | ICD-10-CM

## 2021-04-04 DIAGNOSIS — Z79.82 LONG TERM (CURRENT) USE OF ASPIRIN: ICD-10-CM

## 2021-04-04 DIAGNOSIS — Z88.5 ALLERGY STATUS TO NARCOTIC AGENT: ICD-10-CM

## 2021-04-04 DIAGNOSIS — F31.9 BIPOLAR DISORDER, UNSPECIFIED: ICD-10-CM

## 2021-04-04 DIAGNOSIS — E78.5 HYPERLIPIDEMIA, UNSPECIFIED: ICD-10-CM

## 2021-04-04 LAB
ALBUMIN SERPL ELPH-MCNC: 3.9 G/DL — SIGNIFICANT CHANGE UP (ref 3.3–5)
ALP SERPL-CCNC: 70 U/L — SIGNIFICANT CHANGE UP (ref 40–120)
ALT FLD-CCNC: 16 U/L — SIGNIFICANT CHANGE UP (ref 12–78)
ANION GAP SERPL CALC-SCNC: 7 MMOL/L — SIGNIFICANT CHANGE UP (ref 5–17)
AST SERPL-CCNC: 12 U/L — LOW (ref 15–37)
BASOPHILS # BLD AUTO: 0.03 K/UL — SIGNIFICANT CHANGE UP (ref 0–0.2)
BASOPHILS NFR BLD AUTO: 0.4 % — SIGNIFICANT CHANGE UP (ref 0–2)
BILIRUB SERPL-MCNC: 0.6 MG/DL — SIGNIFICANT CHANGE UP (ref 0.2–1.2)
BUN SERPL-MCNC: 12 MG/DL — SIGNIFICANT CHANGE UP (ref 7–23)
CALCIUM SERPL-MCNC: 9.2 MG/DL — SIGNIFICANT CHANGE UP (ref 8.5–10.1)
CHLORIDE SERPL-SCNC: 109 MMOL/L — HIGH (ref 96–108)
CO2 SERPL-SCNC: 25 MMOL/L — SIGNIFICANT CHANGE UP (ref 22–31)
CREAT SERPL-MCNC: 0.92 MG/DL — SIGNIFICANT CHANGE UP (ref 0.5–1.3)
EOSINOPHIL # BLD AUTO: 0.12 K/UL — SIGNIFICANT CHANGE UP (ref 0–0.5)
EOSINOPHIL NFR BLD AUTO: 1.7 % — SIGNIFICANT CHANGE UP (ref 0–6)
GLUCOSE SERPL-MCNC: 141 MG/DL — HIGH (ref 70–99)
HCT VFR BLD CALC: 40 % — SIGNIFICANT CHANGE UP (ref 34.5–45)
HGB BLD-MCNC: 13.3 G/DL — SIGNIFICANT CHANGE UP (ref 11.5–15.5)
IMM GRANULOCYTES NFR BLD AUTO: 0.3 % — SIGNIFICANT CHANGE UP (ref 0–1.5)
LYMPHOCYTES # BLD AUTO: 2.08 K/UL — SIGNIFICANT CHANGE UP (ref 1–3.3)
LYMPHOCYTES # BLD AUTO: 29.9 % — SIGNIFICANT CHANGE UP (ref 13–44)
MAGNESIUM SERPL-MCNC: 1.9 MG/DL — SIGNIFICANT CHANGE UP (ref 1.6–2.6)
MCHC RBC-ENTMCNC: 32.8 PG — SIGNIFICANT CHANGE UP (ref 27–34)
MCHC RBC-ENTMCNC: 33.3 GM/DL — SIGNIFICANT CHANGE UP (ref 32–36)
MCV RBC AUTO: 98.5 FL — SIGNIFICANT CHANGE UP (ref 80–100)
MONOCYTES # BLD AUTO: 0.45 K/UL — SIGNIFICANT CHANGE UP (ref 0–0.9)
MONOCYTES NFR BLD AUTO: 6.5 % — SIGNIFICANT CHANGE UP (ref 2–14)
NEUTROPHILS # BLD AUTO: 4.25 K/UL — SIGNIFICANT CHANGE UP (ref 1.8–7.4)
NEUTROPHILS NFR BLD AUTO: 61.2 % — SIGNIFICANT CHANGE UP (ref 43–77)
PLATELET # BLD AUTO: 108 K/UL — LOW (ref 150–400)
POTASSIUM SERPL-MCNC: 3.5 MMOL/L — SIGNIFICANT CHANGE UP (ref 3.5–5.3)
POTASSIUM SERPL-SCNC: 3.5 MMOL/L — SIGNIFICANT CHANGE UP (ref 3.5–5.3)
PROT SERPL-MCNC: 7.3 GM/DL — SIGNIFICANT CHANGE UP (ref 6–8.3)
RBC # BLD: 4.06 M/UL — SIGNIFICANT CHANGE UP (ref 3.8–5.2)
RBC # FLD: 12.6 % — SIGNIFICANT CHANGE UP (ref 10.3–14.5)
SODIUM SERPL-SCNC: 141 MMOL/L — SIGNIFICANT CHANGE UP (ref 135–145)
TROPONIN I SERPL-MCNC: <0.015 NG/ML — SIGNIFICANT CHANGE UP (ref 0.01–0.04)
WBC # BLD: 6.95 K/UL — SIGNIFICANT CHANGE UP (ref 3.8–10.5)
WBC # FLD AUTO: 6.95 K/UL — SIGNIFICANT CHANGE UP (ref 3.8–10.5)

## 2021-04-04 PROCEDURE — 83735 ASSAY OF MAGNESIUM: CPT

## 2021-04-04 PROCEDURE — 99285 EMERGENCY DEPT VISIT HI MDM: CPT

## 2021-04-04 PROCEDURE — 71045 X-RAY EXAM CHEST 1 VIEW: CPT

## 2021-04-04 PROCEDURE — 80053 COMPREHEN METABOLIC PANEL: CPT

## 2021-04-04 PROCEDURE — 96374 THER/PROPH/DIAG INJ IV PUSH: CPT

## 2021-04-04 PROCEDURE — 84484 ASSAY OF TROPONIN QUANT: CPT

## 2021-04-04 PROCEDURE — 93010 ELECTROCARDIOGRAM REPORT: CPT

## 2021-04-04 PROCEDURE — 99284 EMERGENCY DEPT VISIT MOD MDM: CPT

## 2021-04-04 PROCEDURE — 36415 COLL VENOUS BLD VENIPUNCTURE: CPT

## 2021-04-04 PROCEDURE — 93005 ELECTROCARDIOGRAM TRACING: CPT

## 2021-04-04 PROCEDURE — 85025 COMPLETE CBC W/AUTO DIFF WBC: CPT

## 2021-04-04 PROCEDURE — 71045 X-RAY EXAM CHEST 1 VIEW: CPT | Mod: 26

## 2021-04-04 RX ORDER — SODIUM CHLORIDE 9 MG/ML
1000 INJECTION INTRAMUSCULAR; INTRAVENOUS; SUBCUTANEOUS ONCE
Refills: 0 | Status: COMPLETED | OUTPATIENT
Start: 2021-04-04 | End: 2021-04-04

## 2021-04-04 RX ORDER — KETOROLAC TROMETHAMINE 30 MG/ML
30 SYRINGE (ML) INJECTION ONCE
Refills: 0 | Status: DISCONTINUED | OUTPATIENT
Start: 2021-04-04 | End: 2021-04-04

## 2021-04-04 RX ADMIN — Medication 30 MILLIGRAM(S): at 15:39

## 2021-04-04 RX ADMIN — SODIUM CHLORIDE 1000 MILLILITER(S): 9 INJECTION INTRAMUSCULAR; INTRAVENOUS; SUBCUTANEOUS at 15:24

## 2021-04-04 NOTE — ED PROVIDER NOTE - PATIENT PORTAL LINK FT
You can access the FollowMyHealth Patient Portal offered by [facility] by registering at the following website: [email or web address]. By joining [employer]’s FollowMyHealth portal, you will also be able to view your health information using other applications (apps) compatible with our system.

## 2021-04-04 NOTE — ED PROVIDER NOTE - OBJECTIVE STATEMENT
61 y/o female with pmhx of HLD, CAD 7 stents, anxiety, PTSD, heart disease, HTN, bipolar 1 disorder presents to the ED c/o chest pain. Pt states that she currently doesn't have a cardiologist. pt states that her last stent was placed september 2020. Endorses radiation to the left shoulder. Onset of symptoms was at 9am this morning. Pt states that she was stressed this morning. Denies cough, swelling in the legs, D/V. No other complaints at this time. PCP:  61 y/o female with pmhx of HLD, CAD 7 stents, anxiety, PTSD, heart disease, HTN, bipolar 1 disorder presents to the ED c/o chest pain. Pt states that she currently doesn't have a cardiologist. pt states that her last stent was placed september 2020. Endorses radiation to the left shoulder. Onset of symptoms was at 9am this morning. Pt states that she was stressed this morning. Denies cough, swelling in the legs, D/V. No other complaints at this time. PCP:  HEART score of 3

## 2021-04-04 NOTE — ED PROVIDER NOTE - NS_ ATTENDINGSCRIBEDETAILS _ED_A_ED_FT
I, Elbert Sabillon MD,  performed the initial face to face bedside interview with this patient regarding history of present illness, review of symptoms and relevant past medical, social and family history.  I completed an independent physical examination.    The history, relevant review of systems, past medical and surgical history, medical decision making, and physical examination was documented by the scribe in my presence and I attest to the accuracy of the documentation.

## 2021-04-04 NOTE — ED ADULT NURSE NOTE - NSIMPLEMENTINTERV_GEN_ALL_ED
Implemented All Universal Safety Interventions:  [place] to call system. Call bell, personal items and telephone within reach. Instruct patient to call for assistance. Room bathroom lighting operational. Non-slip footwear when patient is off stretcher. Physically safe environment: no spills, clutter or unnecessary equipment. Stretcher in lowest position, wheels locked, appropriate side rails in place.

## 2021-05-10 NOTE — ED ADULT NURSE NOTE - ISOLATION TYPE:
Add Associated Diagnosis When Managing Medication Side Effects: Yes Headache Monitoring: I recommended monitoring the headaches for now. There is no evidence of increased intracranial pressure. They were instructed to call if the headaches are worsening. Nosebleeds Normal Treatment: I explained this is common when taking isotretinoin. I recommended saline mist in each nostril multiple times a day. If this worsens they will contact us. Use Therapeutic Ranged Or Therapeutic Target: please select Range or Target Upper Range (In Mg/Kg): 150 Counseling Text: I reviewed the side effect in detail. Patient should get monthly blood tests, not donate blood, not drive at night if vision affected, and not share medication. Retinoid Dermatitis Normal Treatment: I recommended more frequent application of Cetaphil or CeraVe to the areas of dermatitis. Detail Level: Zone Lower Range (In Mg/Kg): 120 Myalgia Monitoring: I explained this is common when taking isotretinoin. If this worsens they will contact us. Xerosis Aggressive Treatment: I recommended application of Cetaphil or CeraVe numerous times a day going to bed to all dry areas. I also prescribed a topical steroid for twice daily use. Retinoid Dermatitis Aggressive Treatment: I recommended more frequent application of Cetaphil or CeraVe to the areas of dermatitis. I also prescribed a topical steroid for twice daily use until the dermatitis resolves. Kilograms Preamble Statement (Weight Entered In Details Tab): Reported Weight in kilograms: Cheilitis Normal Treatment: I recommended application of Vaseline or Aquaphor numerous times a day (as often as every hour) and before going to bed. Female Pregnancy Counseling Text: Female patients should also be on two forms of birth control while taking this medication and for one month after their last dose. Any Nosebleeds?: Yes - Normal Treatment Completed Therapy?: No Xerosis Normal Treatment: I recommended application of Cetaphil or CeraVe numerous times a day going to bed to all dry areas. Are Labs Available For Review?: No- Not Drawn Yet Male Completion Statement: After discussing his treatment course we decided to discontinue isotretinoin therapy at this time. He shouldn't donate blood for one month after the last dose. He should call with any new symptoms of depression. Dosing Month 2 (Required For Cumulative Dosing): 30mg BID Next Month's Dosage: Continue Current Dosage Cheilitis Aggressive Treatment: I recommended application of Vaseline or Aquaphor numerous times a day (as often as every hour) and before going to bed. I also prescribed a topical steroid for twice daily use. Weight Units: pounds Myalgia Treatment: I explained this is common when taking isotretinoin. If this worsens they will contact us. They may try OTC ibuprofen. What Is The Patient's Gender: Female Patient Weight (Optional But Required For Cumulative Dose-Numbers And Decimals Only): 140 Hypercholesterolemia Monitoring: I explained this is common when taking isotretinoin. We will monitor closely. Female Completion Statement: After discussing her treatment course we decided to discontinue isotretinoin therapy at this time. I explained that she would need to continue her birth control methods for at least one month after the last dosage. She should also get a pregnancy test one month after the last dose. She shouldn't donate blood for one month after the last dose. She should call with any new symptoms of depression. None Xerosis Normal Treatment: I recommended application of Cetaphil or CeraVe numerous times a day and before going to bed to all dry areas. Xerosis Aggressive Treatment: I recommended application of Cetaphil or CeraVe numerous times a day and before going to bed to all dry areas. I also prescribed a topical steroid for twice daily use. Target Cumulative Dosage (In Mg/Kg): 135 Months Of Therapy Completed: 2 Any Headache: Yes - Treatment Pounds Preamble Statement (Weight Entered In Details Tab): Reported Weight in pounds:

## 2021-08-16 NOTE — ED ADULT TRIAGE NOTE - TEMPERATURE IN FAHRENHEIT (DEGREES F)
Impression: Primary iridocyclitis of left eye: H20.012. Resolved. Continue the inveltys about every other day until Sunday, continue the Alphagan & timolol BID OU until Sunday as well. due to IOP spike due to the steroid drop. Plan: Discussed dx with patient and the treatment options as needed. 98

## 2021-09-09 ENCOUNTER — INPATIENT (INPATIENT)
Facility: HOSPITAL | Age: 61
LOS: 10 days | Discharge: HOME | DRG: 750 | End: 2021-09-20
Attending: PSYCHIATRY & NEUROLOGY | Admitting: PSYCHIATRY & NEUROLOGY
Payer: MEDICAID

## 2021-09-09 VITALS
HEIGHT: 64 IN | RESPIRATION RATE: 18 BRPM | WEIGHT: 141.98 LBS | SYSTOLIC BLOOD PRESSURE: 180 MMHG | HEART RATE: 71 BPM | OXYGEN SATURATION: 100 % | TEMPERATURE: 98 F | DIASTOLIC BLOOD PRESSURE: 66 MMHG

## 2021-09-09 DIAGNOSIS — K27.5 CHRONIC OR UNSPECIFIED PEPTIC ULCER, SITE UNSPECIFIED, WITH PERFORATION: Chronic | ICD-10-CM

## 2021-09-09 DIAGNOSIS — F29 UNSPECIFIED PSYCHOSIS NOT DUE TO A SUBSTANCE OR KNOWN PHYSIOLOGICAL CONDITION: ICD-10-CM

## 2021-09-09 DIAGNOSIS — Z98.890 OTHER SPECIFIED POSTPROCEDURAL STATES: Chronic | ICD-10-CM

## 2021-09-09 DIAGNOSIS — Z87.42 PERSONAL HISTORY OF OTHER DISEASES OF THE FEMALE GENITAL TRACT: Chronic | ICD-10-CM

## 2021-09-09 DIAGNOSIS — Z98.62 PERIPHERAL VASCULAR ANGIOPLASTY STATUS: Chronic | ICD-10-CM

## 2021-09-09 DIAGNOSIS — F31.81 BIPOLAR II DISORDER: ICD-10-CM

## 2021-09-09 DIAGNOSIS — Z90.49 ACQUIRED ABSENCE OF OTHER SPECIFIED PARTS OF DIGESTIVE TRACT: Chronic | ICD-10-CM

## 2021-09-09 LAB
ALBUMIN SERPL ELPH-MCNC: 3.7 G/DL — SIGNIFICANT CHANGE UP (ref 3.3–5)
ALP SERPL-CCNC: 73 U/L — SIGNIFICANT CHANGE UP (ref 40–120)
ALT FLD-CCNC: 26 U/L — SIGNIFICANT CHANGE UP (ref 12–78)
ANION GAP SERPL CALC-SCNC: 7 MMOL/L — SIGNIFICANT CHANGE UP (ref 5–17)
APAP SERPL-MCNC: <2 UG/ML — LOW (ref 10–30)
APPEARANCE UR: CLEAR — SIGNIFICANT CHANGE UP
AST SERPL-CCNC: 17 U/L — SIGNIFICANT CHANGE UP (ref 15–37)
BASOPHILS # BLD AUTO: 0.04 K/UL — SIGNIFICANT CHANGE UP (ref 0–0.2)
BASOPHILS NFR BLD AUTO: 0.5 % — SIGNIFICANT CHANGE UP (ref 0–2)
BILIRUB SERPL-MCNC: 0.3 MG/DL — SIGNIFICANT CHANGE UP (ref 0.2–1.2)
BILIRUB UR-MCNC: NEGATIVE — SIGNIFICANT CHANGE UP
BUN SERPL-MCNC: 19 MG/DL — SIGNIFICANT CHANGE UP (ref 7–23)
CALCIUM SERPL-MCNC: 8.9 MG/DL — SIGNIFICANT CHANGE UP (ref 8.5–10.1)
CHLORIDE SERPL-SCNC: 108 MMOL/L — SIGNIFICANT CHANGE UP (ref 96–108)
CO2 SERPL-SCNC: 24 MMOL/L — SIGNIFICANT CHANGE UP (ref 22–31)
COLOR SPEC: YELLOW — SIGNIFICANT CHANGE UP
CREAT SERPL-MCNC: 0.87 MG/DL — SIGNIFICANT CHANGE UP (ref 0.5–1.3)
DIFF PNL FLD: NEGATIVE — SIGNIFICANT CHANGE UP
EOSINOPHIL # BLD AUTO: 0.16 K/UL — SIGNIFICANT CHANGE UP (ref 0–0.5)
EOSINOPHIL NFR BLD AUTO: 2 % — SIGNIFICANT CHANGE UP (ref 0–6)
ETHANOL SERPL-MCNC: <10 MG/DL — SIGNIFICANT CHANGE UP (ref 0–10)
GLUCOSE SERPL-MCNC: 87 MG/DL — SIGNIFICANT CHANGE UP (ref 70–99)
GLUCOSE UR QL: NEGATIVE MG/DL — SIGNIFICANT CHANGE UP
HCT VFR BLD CALC: 41.1 % — SIGNIFICANT CHANGE UP (ref 34.5–45)
HGB BLD-MCNC: 13.3 G/DL — SIGNIFICANT CHANGE UP (ref 11.5–15.5)
IMM GRANULOCYTES NFR BLD AUTO: 0.2 % — SIGNIFICANT CHANGE UP (ref 0–1.5)
KETONES UR-MCNC: NEGATIVE — SIGNIFICANT CHANGE UP
LEUKOCYTE ESTERASE UR-ACNC: NEGATIVE — SIGNIFICANT CHANGE UP
LYMPHOCYTES # BLD AUTO: 3.04 K/UL — SIGNIFICANT CHANGE UP (ref 1–3.3)
LYMPHOCYTES # BLD AUTO: 38 % — SIGNIFICANT CHANGE UP (ref 13–44)
MCHC RBC-ENTMCNC: 31.7 PG — SIGNIFICANT CHANGE UP (ref 27–34)
MCHC RBC-ENTMCNC: 32.4 GM/DL — SIGNIFICANT CHANGE UP (ref 32–36)
MCV RBC AUTO: 97.9 FL — SIGNIFICANT CHANGE UP (ref 80–100)
MONOCYTES # BLD AUTO: 0.91 K/UL — HIGH (ref 0–0.9)
MONOCYTES NFR BLD AUTO: 11.4 % — SIGNIFICANT CHANGE UP (ref 2–14)
NEUTROPHILS # BLD AUTO: 3.84 K/UL — SIGNIFICANT CHANGE UP (ref 1.8–7.4)
NEUTROPHILS NFR BLD AUTO: 47.9 % — SIGNIFICANT CHANGE UP (ref 43–77)
NITRITE UR-MCNC: NEGATIVE — SIGNIFICANT CHANGE UP
PCP SPEC-MCNC: SIGNIFICANT CHANGE UP
PH UR: 6 — SIGNIFICANT CHANGE UP (ref 5–8)
PLATELET # BLD AUTO: 173 K/UL — SIGNIFICANT CHANGE UP (ref 150–400)
POTASSIUM SERPL-MCNC: 3.5 MMOL/L — SIGNIFICANT CHANGE UP (ref 3.5–5.3)
POTASSIUM SERPL-SCNC: 3.5 MMOL/L — SIGNIFICANT CHANGE UP (ref 3.5–5.3)
PROT SERPL-MCNC: 7.2 GM/DL — SIGNIFICANT CHANGE UP (ref 6–8.3)
PROT UR-MCNC: NEGATIVE MG/DL — SIGNIFICANT CHANGE UP
RBC # BLD: 4.2 M/UL — SIGNIFICANT CHANGE UP (ref 3.8–5.2)
RBC # FLD: 13.3 % — SIGNIFICANT CHANGE UP (ref 10.3–14.5)
SALICYLATES SERPL-MCNC: <1.7 MG/DL — LOW (ref 2.8–20)
SARS-COV-2 RNA SPEC QL NAA+PROBE: SIGNIFICANT CHANGE UP
SODIUM SERPL-SCNC: 139 MMOL/L — SIGNIFICANT CHANGE UP (ref 135–145)
SP GR SPEC: 1.01 — SIGNIFICANT CHANGE UP (ref 1.01–1.02)
TROPONIN I SERPL-MCNC: <0.015 NG/ML — SIGNIFICANT CHANGE UP (ref 0.01–0.04)
UROBILINOGEN FLD QL: NEGATIVE MG/DL — SIGNIFICANT CHANGE UP
WBC # BLD: 8.01 K/UL — SIGNIFICANT CHANGE UP (ref 3.8–10.5)
WBC # FLD AUTO: 8.01 K/UL — SIGNIFICANT CHANGE UP (ref 3.8–10.5)

## 2021-09-09 PROCEDURE — 71045 X-RAY EXAM CHEST 1 VIEW: CPT | Mod: 26

## 2021-09-09 PROCEDURE — 93010 ELECTROCARDIOGRAM REPORT: CPT | Mod: 76

## 2021-09-09 RX ORDER — NICOTINE POLACRILEX 2 MG
1 GUM BUCCAL DAILY
Refills: 0 | Status: DISCONTINUED | OUTPATIENT
Start: 2021-09-09 | End: 2021-09-20

## 2021-09-09 RX ADMIN — Medication 1 PATCH: at 17:15

## 2021-09-09 NOTE — ED PROVIDER NOTE - OBJECTIVE STATEMENT
59 y/o female with a PMHx of anxiety, bipolar 1 disorder, CAD, heart disease, HLD, PTSD, secondary HTN presents to the ED for hallucinations. Pt reports she had palpitations yesterday, was seen at an outside hospital but "was put in the psych johnson." Pt was d/c then seen at another hospital and "was put in the psych johnson."  Pt reports "I have been seeing demons and stuff other people do not see." No other complaints at this time.

## 2021-09-09 NOTE — ED BEHAVIORAL HEALTH ASSESSMENT NOTE - MUSCLE TONE / STRENGTH
Previously identified with severe PCM 4/13/18 (Barry, CHING), weight at 99# (admission), now 95# (admission), Usual wt 110#. Observed Glucerna shake at bedside (previous supplement).
Normal muscle tone/strength

## 2021-09-09 NOTE — ED BEHAVIORAL HEALTH ASSESSMENT NOTE - DESCRIPTION
see BH note for more details heart disease (s/p stents), htn, chronic back pain Pt is single and on SSI.

## 2021-09-09 NOTE — ED BEHAVIORAL HEALTH ASSESSMENT NOTE - SUMMARY
The pt  is a 60 year old single, unemployed,  female, non-caregiver, homeless,  h/o, alcohol abuse (1-3 bottles of wine daily, history of withdrawals but no seizures), history of benzodiazepine misuse (Xanax, Valium), history bipolar 2, several inpatient hospitalizations (most recently in [facility]; 1 month ago,  one prior suicide attempt by overdose in 2012, reported axis II - histrionic traits, noncompliant with treatment,  no hx of violence, no hx of arrests, BIB police for  and VH. Pt states "I am not schizophrenic, I am not depressed". Denies SI, HI, but reports hearing and seeing devil. She states "I am psychic".   Pt is requesting voluntary admission. denies travels, but she was not vaccinated and does not want to be vaccinated. Pt state she was exposed to a lot of people in public transportation.     Plan:   Hold for reassessment, pending COVID.   discussed with Dr Fonseca and wilL give verbal handoff to telepsych.

## 2021-09-09 NOTE — ED ADULT NURSE NOTE - OBJECTIVE STATEMENT
Patient BIBA complaining of psychiatric evaluation. [facility] [employer] Patient states she is homeless, states she "owns the [employer] but does not have paperwork on me.". Patient states she can see into "other realms", states she can see demons. Hx bipolar, states stopped all medication x 2 weeks ago. Patient denies HI/SI. Patient cooperative on arrival. Patient states she was seen in [facility] ER for palpitations yesterday, patient states she was medically cleared, stayed overnight in psych unit. Patient denies CP, SOB, fever, chills, NVD, urinary symptoms. Patient placed on 1:1 for safety, belongings removed and placed in security.

## 2021-09-09 NOTE — ED BEHAVIORAL HEALTH ASSESSMENT NOTE - RISK ASSESSMENT
Chronic risk factors: single, ongoing substance use; estranged from family and friends; Axis II pathology; hx of belligerence; chronic unstable domicile; hx of suicide attempt. Protective factors: age < 65 yrs; medication and treatment compliant in general; access to health services.   Acute risk factors: unstable living situation, medical comorbidities  Protective factors; wants help.   Mitigation: . restarting meds , safety plan. Low Acute Suicide Risk

## 2021-09-09 NOTE — ED ADULT NURSE REASSESSMENT NOTE - NS ED NURSE REASSESS COMMENT FT1
Patient comfortable in bed awaiting tele psych. Patient has no complaints at this time, calm and cooperative, will continue to monitor
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
## 2021-09-09 NOTE — ED PROVIDER NOTE - CPE EDP PSYCH NORM
Airway  Urgency: elective    Airway not difficult    General Information and Staff    Patient location during procedure: OR  CRNA: MACARIO STEPHENSON    Indications and Patient Condition  Indications for airway management: airway protection    Preoxygenated: yes  Mask difficulty assessment: 1 - vent by mask    Final Airway Details  Final airway type: endotracheal airway      Successful airway: ETT  Cuffed: yes   Successful intubation technique: direct laryngoscopy  Endotracheal tube insertion site: oral  Blade: Joy  Blade size: #2  ETT size: 7.0 mm  Cormack-Lehane Classification: grade I - full view of glottis  Placement verified by: capnometry   Measured from: lips  Number of attempts at approach: 1               normal...

## 2021-09-09 NOTE — ED BEHAVIORAL HEALTH ASSESSMENT NOTE - REASON
PENDING COVID RESULT; will then need inpatient psych admission on [facility] legal status

## 2021-09-09 NOTE — ED ADULT TRIAGE NOTE - CHIEF COMPLAINT QUOTE
patient comes to ED for trespassing, police were notified, pt then was reporting seeing and hearing demons for 2 years. reporting witch craft. denies SI or HI. AxOx4, calm and cooperative. patient reports she is homeless. denies any complaints
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
## 2021-09-09 NOTE — ED PROVIDER NOTE - PROGRESS NOTE DETAILS
Palak Camacho for attending Dr. Marinelli: Signed out to Dr. Whyte. Palak Camacho for attending Dr. Marinelli: Signed out to Dr. Whyte- 2 troponins for medical clearance as patient has cardiac hx- no chest pain at this time.

## 2021-09-10 DIAGNOSIS — F19.20 OTHER PSYCHOACTIVE SUBSTANCE DEPENDENCE, UNCOMPLICATED: ICD-10-CM

## 2021-09-10 DIAGNOSIS — Z91.14 PATIENT'S OTHER NONCOMPLIANCE WITH MEDICATION REGIMEN: ICD-10-CM

## 2021-09-10 DIAGNOSIS — F31.9 BIPOLAR DISORDER, UNSPECIFIED: ICD-10-CM

## 2021-09-10 DIAGNOSIS — F25.0 SCHIZOAFFECTIVE DISORDER, BIPOLAR TYPE: ICD-10-CM

## 2021-09-10 LAB
ALBUMIN SERPL ELPH-MCNC: 3.9 G/DL — SIGNIFICANT CHANGE UP (ref 3.3–5)
ALP SERPL-CCNC: 75 U/L — SIGNIFICANT CHANGE UP (ref 40–120)
ALT FLD-CCNC: 27 U/L — SIGNIFICANT CHANGE UP (ref 12–78)
ANION GAP SERPL CALC-SCNC: 7 MMOL/L — SIGNIFICANT CHANGE UP (ref 5–17)
AST SERPL-CCNC: 15 U/L — SIGNIFICANT CHANGE UP (ref 15–37)
BILIRUB DIRECT SERPL-MCNC: <0.1 MG/DL — SIGNIFICANT CHANGE UP (ref 0–0.2)
BILIRUB INDIRECT FLD-MCNC: >0.1 MG/DL — LOW (ref 0.2–1)
BILIRUB SERPL-MCNC: 0.2 MG/DL — SIGNIFICANT CHANGE UP (ref 0.2–1.2)
BUN SERPL-MCNC: 16 MG/DL — SIGNIFICANT CHANGE UP (ref 7–23)
CALCIUM SERPL-MCNC: 8.9 MG/DL — SIGNIFICANT CHANGE UP (ref 8.5–10.1)
CHLORIDE SERPL-SCNC: 106 MMOL/L — SIGNIFICANT CHANGE UP (ref 96–108)
CO2 SERPL-SCNC: 27 MMOL/L — SIGNIFICANT CHANGE UP (ref 22–31)
COVID-19 SPIKE DOMAIN AB INTERP: NEGATIVE — SIGNIFICANT CHANGE UP
COVID-19 SPIKE DOMAIN ANTIBODY RESULT: 0.4 U/ML — SIGNIFICANT CHANGE UP
CREAT SERPL-MCNC: 0.94 MG/DL — SIGNIFICANT CHANGE UP (ref 0.5–1.3)
GLUCOSE SERPL-MCNC: 131 MG/DL — HIGH (ref 70–99)
MAGNESIUM SERPL-MCNC: 2.3 MG/DL — SIGNIFICANT CHANGE UP (ref 1.6–2.6)
PHOSPHATE SERPL-MCNC: 3.2 MG/DL — SIGNIFICANT CHANGE UP (ref 2.5–4.5)
POTASSIUM SERPL-MCNC: 3.5 MMOL/L — SIGNIFICANT CHANGE UP (ref 3.5–5.3)
POTASSIUM SERPL-SCNC: 3.5 MMOL/L — SIGNIFICANT CHANGE UP (ref 3.5–5.3)
PROT SERPL-MCNC: 7.3 GM/DL — SIGNIFICANT CHANGE UP (ref 6–8.3)
SARS-COV-2 IGG+IGM SERPL QL IA: 0.4 U/ML — SIGNIFICANT CHANGE UP
SARS-COV-2 IGG+IGM SERPL QL IA: NEGATIVE — SIGNIFICANT CHANGE UP
SODIUM SERPL-SCNC: 140 MMOL/L — SIGNIFICANT CHANGE UP (ref 135–145)

## 2021-09-10 PROCEDURE — 78452 HT MUSCLE IMAGE SPECT MULT: CPT

## 2021-09-10 PROCEDURE — 86769 SARS-COV-2 COVID-19 ANTIBODY: CPT

## 2021-09-10 PROCEDURE — 84443 ASSAY THYROID STIM HORMONE: CPT

## 2021-09-10 PROCEDURE — 93017 CV STRESS TEST TRACING ONLY: CPT

## 2021-09-10 PROCEDURE — 83735 ASSAY OF MAGNESIUM: CPT

## 2021-09-10 PROCEDURE — 36415 COLL VENOUS BLD VENIPUNCTURE: CPT

## 2021-09-10 PROCEDURE — 93005 ELECTROCARDIOGRAM TRACING: CPT

## 2021-09-10 PROCEDURE — 80076 HEPATIC FUNCTION PANEL: CPT

## 2021-09-10 PROCEDURE — U0003: CPT

## 2021-09-10 PROCEDURE — 80061 LIPID PANEL: CPT

## 2021-09-10 PROCEDURE — A9500: CPT

## 2021-09-10 PROCEDURE — 99231 SBSQ HOSP IP/OBS SF/LOW 25: CPT

## 2021-09-10 PROCEDURE — 84100 ASSAY OF PHOSPHORUS: CPT

## 2021-09-10 PROCEDURE — 99222 1ST HOSP IP/OBS MODERATE 55: CPT | Mod: GC

## 2021-09-10 PROCEDURE — 80048 BASIC METABOLIC PNL TOTAL CA: CPT

## 2021-09-10 PROCEDURE — U0005: CPT

## 2021-09-10 PROCEDURE — 83036 HEMOGLOBIN GLYCOSYLATED A1C: CPT

## 2021-09-10 PROCEDURE — 93306 TTE W/DOPPLER COMPLETE: CPT

## 2021-09-10 RX ORDER — LORATADINE 10 MG/1
10 TABLET ORAL DAILY
Refills: 0 | Status: DISCONTINUED | OUTPATIENT
Start: 2021-09-10 | End: 2021-09-20

## 2021-09-10 RX ORDER — LIDOCAINE 4 G/100G
1 CREAM TOPICAL DAILY
Refills: 0 | Status: DISCONTINUED | OUTPATIENT
Start: 2021-09-10 | End: 2021-09-20

## 2021-09-10 RX ORDER — LANOLIN ALCOHOL/MO/W.PET/CERES
5 CREAM (GRAM) TOPICAL AT BEDTIME
Refills: 0 | Status: DISCONTINUED | OUTPATIENT
Start: 2021-09-10 | End: 2021-09-17

## 2021-09-10 RX ORDER — HALOPERIDOL DECANOATE 100 MG/ML
5 INJECTION INTRAMUSCULAR EVERY 6 HOURS
Refills: 0 | Status: DISCONTINUED | OUTPATIENT
Start: 2021-09-10 | End: 2021-09-20

## 2021-09-10 RX ORDER — ACETAMINOPHEN 500 MG
650 TABLET ORAL EVERY 6 HOURS
Refills: 0 | Status: DISCONTINUED | OUTPATIENT
Start: 2021-09-10 | End: 2021-09-20

## 2021-09-10 RX ORDER — LISINOPRIL 2.5 MG/1
5 TABLET ORAL DAILY
Refills: 0 | Status: DISCONTINUED | OUTPATIENT
Start: 2021-09-10 | End: 2021-09-20

## 2021-09-10 RX ORDER — BACITRACIN AND POLYMYXIN B SULFATE 500; 10000 [USP'U]/G; [USP'U]/G
1 OINTMENT TOPICAL THREE TIMES A DAY
Refills: 0 | Status: DISCONTINUED | OUTPATIENT
Start: 2021-09-10 | End: 2021-09-20

## 2021-09-10 RX ORDER — ASPIRIN/CALCIUM CARB/MAGNESIUM 324 MG
81 TABLET ORAL DAILY
Refills: 0 | Status: DISCONTINUED | OUTPATIENT
Start: 2021-09-10 | End: 2021-09-20

## 2021-09-10 RX ORDER — MAGNESIUM HYDROXIDE 400 MG/1
30 TABLET, CHEWABLE ORAL DAILY
Refills: 0 | Status: DISCONTINUED | OUTPATIENT
Start: 2021-09-10 | End: 2021-09-20

## 2021-09-10 RX ORDER — ACETAMINOPHEN 500 MG
650 TABLET ORAL ONCE
Refills: 0 | Status: COMPLETED | OUTPATIENT
Start: 2021-09-10 | End: 2021-09-10

## 2021-09-10 RX ORDER — ATORVASTATIN CALCIUM 80 MG/1
10 TABLET, FILM COATED ORAL AT BEDTIME
Refills: 0 | Status: DISCONTINUED | OUTPATIENT
Start: 2021-09-10 | End: 2021-09-20

## 2021-09-10 RX ORDER — NICOTINE POLACRILEX 2 MG
2 GUM BUCCAL
Refills: 0 | Status: DISCONTINUED | OUTPATIENT
Start: 2021-09-10 | End: 2021-09-20

## 2021-09-10 RX ORDER — DIPHENHYDRAMINE HCL 50 MG
50 CAPSULE ORAL EVERY 6 HOURS
Refills: 0 | Status: DISCONTINUED | OUTPATIENT
Start: 2021-09-10 | End: 2021-09-20

## 2021-09-10 RX ORDER — LANOLIN ALCOHOL/MO/W.PET/CERES
3 CREAM (GRAM) TOPICAL AT BEDTIME
Refills: 0 | Status: DISCONTINUED | OUTPATIENT
Start: 2021-09-10 | End: 2021-09-10

## 2021-09-10 RX ADMIN — Medication 650 MILLIGRAM(S): at 23:00

## 2021-09-10 RX ADMIN — Medication 1 PATCH: at 19:30

## 2021-09-10 RX ADMIN — Medication 2 MILLIGRAM(S): at 15:45

## 2021-09-10 RX ADMIN — Medication 650 MILLIGRAM(S): at 04:41

## 2021-09-10 RX ADMIN — Medication 650 MILLIGRAM(S): at 21:17

## 2021-09-10 RX ADMIN — LORATADINE 10 MILLIGRAM(S): 10 TABLET ORAL at 09:42

## 2021-09-10 RX ADMIN — Medication 1 PATCH: at 09:42

## 2021-09-10 RX ADMIN — Medication 81 MILLIGRAM(S): at 07:05

## 2021-09-10 RX ADMIN — LISINOPRIL 5 MILLIGRAM(S): 2.5 TABLET ORAL at 07:06

## 2021-09-10 RX ADMIN — Medication 5 MILLIGRAM(S): at 21:11

## 2021-09-10 NOTE — PROGRESS NOTE BEHAVIORAL HEALTH - NSBHFUPINTERVALCCFT_PSY_A_CORE
" I helped the police to find 40 dead bodies. They just keep popping up . I see them and I can hear them all of a sudden."

## 2021-09-10 NOTE — PROGRESS NOTE BEHAVIORAL HEALTH - NSBHFUPADDHPIFT_PSY_A_CORE
The pt  is a 60 year old single, unemployed, WF, non-caregiver, homeless with a h/o bipolar disorder , comorbid polysubstance use disorder ( ETOH, THC, Benzodiazepines) h/o, alcohol abuse (1-3 bottles of wine daily, history of withdrawals but no seizures), history of benzodiazepine misuse (Xanax, Valium), history bipolar 2, several inpatient hospitalizations (most recently at [facility] on 9/9/2021  and at   [facility]; 1 month prior to this admission to [facility] on 9/9/2021 , a h/o  one prior suicide attempt by overdose in 2012, reported axis II - histrionic traits, noncompliant with treatment,  no hx of violence, no hx of arrests, BIB police on 9/9/2021 to the ED at [facility] for emergency evaluation of ongoing affective psychosis.   In the ED, the pt presented as floridly manic and psychotic with virtually no insight into the nature and severity of her illness and the need for consistent treatment with evidence based antipsychotic and mood stabilizing medications. The pt was amenable to a voluntary legal status admission to  inpatient psychiatry via the ED at [facility] on 9/9/2021.

## 2021-09-10 NOTE — PROGRESS NOTE BEHAVIORAL HEALTH - NSBHADDHXFAMFT_PSY_A_CORE
The pt's parents are both . Mother  from breast cancer at age 55. Father  from lung cancer also at age 55.   The pt's brother is + lung cancer. Pt's sister + DM

## 2021-09-10 NOTE — PATIENT PROFILE BEHAVIORAL HEALTH - NSBHBEHAVIOR_PSY_A_CORE
inappropriate for situation/attention seeking/cooperative/initiates interactions/non-compliant with medications/responds to interactions/restless

## 2021-09-10 NOTE — ED BEHAVIORAL HEALTH NOTE - BEHAVIORAL HEALTH NOTE
Spoke to ED RN. She reported that patient was awake until an hour prior to assessment, and at that time she reported she had a headache and she got tylenol and she went back to sleep. She was cooperative during shift, did not have anything to eat or drink, no visitors, did not require any PRN medications.    Assessment per previous clinician's note.    Plan remains the same, for voluntary admission when bed available and COVID resulted and 9.13 legals received

## 2021-09-10 NOTE — PROGRESS NOTE BEHAVIORAL HEALTH - PROBLEM SELECTOR PLAN 1
1. Pt continues to refuse any antipsychotic or mood stabilizing medications to alleviate her severe affective psychosis  2.Melatonin increased to 5 mg po qhs ( insomnia)  3.Pt encouraged to attend therapy groups as tolerated  4.Disposition planning ongoing

## 2021-09-10 NOTE — CONSULT NOTE ADULT - ASSESSMENT
ASSESSMENT: Patient is a 59 y/o F homeless, with pmhx of HTN, CAD dz s/p stent placement in 2007, HLD, anxiety, bipolar dx, hx of ETOH and cocaine and cannabis abuse. Patient has multiple past psychiatric hospitalizations  last one  a month ago in [facility]. Patient BIB by police for psychiatric evaluation as patient was having AH and VH. Patient being admitted to inpatient psych for stabilization and further management.     PLAN:     1. PSYCH/ BIPOLAR/ AH- VH / SUBSTANCE ABUSE   -Admit to 5N  -prn Ativan, Haldol, Benadryl   -Nicotine patch   -Utox positive for THC. On CiwA with prn Ativan, Monitor for signs of withdrawal   -Further medication adjustment as per psych team      2. HTN/ CAD / HLD   -Resume Lisinopril, baby ASA, and Lipitor   -Lipid Profile in AM     3. ABNORMAL EKG   -Patient denies any chest pain, pressure, SOB, palpitations, dizziness. Troponin negative x3     4. HYPERGLYCEMIA   -(mild) serum glucose 131   -A1c in AM     5.DVT PROPHYLAXIS   Patient is ambulatory, encourage frequent ambulation     Plan discussed with patient, all questions answered to satisfaction. Case and plan reviewed with Attending Physician.  ASSESSMENT: Patient is a 61 y/o F homeless, with pmhx of HTN, CAD dz s/p stent placement in 2007, HLD, anxiety, bipolar dx, hx of ETOH and cocaine and cannabis abuse. Patient has multiple past psychiatric hospitalizations  last one  a month ago in [facility]. Patient BIB by police for psychiatric evaluation as patient was having AH and VH. Patient being admitted to inpatient psych for stabilization and further management.     PLAN:     1. PSYCH/ BIPOLAR/ AH- VH / SUBSTANCE ABUSE   -Admit to 5N  -prn Ativan, Haldol, Benadryl   -Nicotine patch   -BAL is WNL, Utox positive for THC. On CiwA with prn Ativan, Monitor for signs of withdrawal   -Further medication adjustment as per psych team      2. HTN/ CAD / HLD   -Resume Lisinopril, baby ASA, and Lipitor   -Lipid Profile in AM     3. ABNORMAL EKG   -Patient denies any chest pain, pressure, SOB, palpitations, dizziness. Troponin negative x3     4. HYPERGLYCEMIA   -(mild) serum glucose 131   -A1c in AM     5.DVT PROPHYLAXIS   Patient is ambulatory, encourage frequent ambulation     Plan discussed with patient, all questions answered to satisfaction. Case and plan reviewed with Attending Physician.

## 2021-09-10 NOTE — PROGRESS NOTE BEHAVIORAL HEALTH - NSBHADDHXSUBSTFT_PSY_A_CORE
Relevant Past Substance Use History Per 5N hx, pt endorsing active ETOH abuse (1-3 bottles of wine daily vs several glasses of wine with dinner), hx of withdrawals but no seizures, endorsed daily medical marijuana use, hx of benzo misuse (xanax and valium), remote hx of cocaine use, hx of inpatient treatment, Urine tox screen positive for THC.  alcohol abuse (1-3 bottles of wine daily, history of withdrawals but no seizures), history of benzodiazepine misuse (Xanax, Valium),  Cannabis use disorder daily medical marijuana use, "most of the day," however has run out of her prescription in past week.   , alcohol use disorder, stimulant use in remission  substance treatment in past  vaping less than one JUUL cartridge daily  Cannabis; Cocaine/Crack  daily medical marijuana.   Relevant Psychosocial/Family History Per 5N hx, no family hx known.  Collateral from Therapist Sarah [phone].   Cannabis use disorder, alcohol use disorder, stimulant use in remission, bipolar 2. Has been on doxepin and klonopin. Chronic marisol today from location other than her home which surprised therapist. Reportedly was [place] find her nieces in [place]

## 2021-09-10 NOTE — CONSULT NOTE ADULT - ATTENDING COMMENTS
I agree with the above assessment and plan with additons as below:    Pt with abnormal EKG, asymptomatic -> add TTE & cardiology consult given HEART Score 3 , diet changed to DASH/TLC  Pt with hyperglycemia-> will f/u HbA1c, if appropriate will start carb restriction    Will remain on consult given referral for cardiac evaluation I agree with the above assessment and plan with additons as below:    Additional history obtained from [facility]: Pt with noted non compliance with cardiac meds and DOAC in setting of CAD, HTN, HLD, smoking, drug use. Noted to have various histories of stents 2 vs 4?  with pmh/o post cath bradycardia with high vagal tone in 2019, LHC at that time found patent LAD stent with mRCA 30%, TTE EF 65-70% with hyperdynamic LV and mild TR    Pt with abnormal EKG, asymptomatic -> add TTE & cardiology consult given HEART Score 3 , diet changed to DASH/TLC  Pt with hyperglycemia-> will f/u HbA1c, if appropriate will start carb restriction    Will remain on consult given referral for cardiac evaluation

## 2021-09-10 NOTE — PROGRESS NOTE BEHAVIORAL HEALTH - NSBHADDHXPSYCHFT_PSY_A_CORE
"Relevant Past Psychiatric History Per pt's 5N hx, pt with a PPH of self-reported dx of bipolar disorder with delusions of grandeur and anxiety. Per chart, pt with hx of ETOH abuse, bipolar 2 disorder, axis II histrionic traits, with multiple inpatient admissions, most recently [facility] 3 years ago and [facility] psych unit years ago after SA via overdose. Per chart, last psych admission Sept. 2016.

## 2021-09-10 NOTE — PROGRESS NOTE BEHAVIORAL HEALTH - NSBHADDHXMEDFT_PSY_A_CORE
HTN  Hyperlipidemia ( pt refuses statins " because they are poison")  CAD  ALLERGY TO MORPHINE AND NUTS , PEANUTS

## 2021-09-10 NOTE — PATIENT PROFILE BEHAVIORAL HEALTH - NSBHPSYTREAT_PSY_A_CORE
longhx bipolar d/o, alcohol and substance abuse, many admissions, non-compliance/private therapist/drug treatment center/substance use

## 2021-09-10 NOTE — PROGRESS NOTE BEHAVIORAL HEALTH - NSBHFUPINTERVALHXFT_PSY_A_CORE
Pt seen in her room. Pt appeared disheveled with poor attention to grooming and to her ADLs. 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 " When asked by writer how the  pt was certain of this report, the pt stated jovially, " because I was the one who found the one person who was still alive. That's how I know this all happened." The pt made good eye contact  with inappropriate smiling affect and  somewhat elated mood  with underlying irritability easily appearing. The pt's speech was rapid fire, near pressured and difficult to interrupt. Non goal directed and with flight of ideas. The pt 's thinking appeared quite illogical with ongoing paranoid, grandiose and persecutory delusional thinking. The pt currently denied SI /HI but did endorse recent AH and VH as above. The pt's judgment appears quite impaired with little insight into the nature and severity of her presumptive schizoaffective disorder and reported h/o ETOH use d/o and CBD  . The pt remains vehemently opposed to any trials of any antipsychotic or mood stabilizing medications and appeared to express pity towards those medical professionals who " always look to these drugs to help when all I need is my medical marijuana and I'm fine."

## 2021-09-10 NOTE — CONSULT NOTE ADULT - SUBJECTIVE AND OBJECTIVE BOX
HOSPITALIST PA CONSULT NOTE     ADMITTING DIAGNOSIS: Bipolar Disorder     HISTORY OF THE PRESENT ILLNESS:  Patient is 61 y/o F with pmhx of HTN, CAD dz s/p stent placement in , HLD, anxiety, bipolar dx, hx of ETOH and cocaine and cannabis abuse. Patient has multiple past psychiatric hospitalizations  last one  a month ago in [facility] [place]. Patient BIB by police for psychiatric evaluation as patient was having AH and VH stating she "hears/ sees the Devil". Patient is currently homeless and [place] the trains and was acting psychotic and the police brought her here.     On arrival to bedside, patient is very pleasant and social     PAST MEDICAL HISTORY:  PAST MEDICAL & SURGICAL HISTORY:  Bipolar 1 disorder  Heart disease  Secondary hypertension  PTSD (post-traumatic stress disorder)  Anxiety  CAD (coronary artery disease)  Hyperlipidemia  History of appendectomy  History of lumpectomy  Perforated ulcer  History of ovarian cyst  History of bilateral breast reduction surgery  H/O angioplasty  stents placed in  &amp; 2019    FAMILY HISTORY:   non-contributory to the patient's current presentation    SOCIAL HISTORY:  endorses smoking hx since she was 16 yrs old. on and off. Current vapes for the past year and smokes cigarettes occasionally. Endorses EtOH use, hx of EToH abuse (1-3 bottles daily v. several glasses with dinner. Hx of withdrawals in the past, no seizures.  Hx of cocaine abuse, Benzo misuse, and cannabis use (has been on medical marijuana)     REVIEW OF SYSTEMS:    CONSTITUTIONAL: No weakness, fevers or chills  EYES/ENT: No visual changes;  No vertigo or throat pain   NECK: No pain or stiffness  RESPIRATORY: No cough, wheezing, hemoptysis; No shortness of breath  CARDIOVASCULAR: No chest pain or palpitations  GASTROINTESTINAL: No abdominal or epigastric pain. No nausea, vomiting, or hematemesis; No diarrhea or constipation.   GENITOURINARY: No dysuria, frequency or hematuria  NEUROLOGICAL: No numbness or weakness, no dizziness   MSK: endorses some occasional joint pain and back pain 2/2 chronic condition (herniated disc, psoriatic arthritis)   SKIN: No itching, burning, rashes, or lesions   All other review of systems is negative unless indicated above.      MEDICATIONS  (STANDING):  aspirin enteric coated 81 milliGRAM(s) Oral daily  atorvastatin 10 milliGRAM(s) Oral at bedtime  bacitracin/polymyxin B Ointment 1 Application(s) Topical three times a day  lidocaine   4% Patch 1 Patch Transdermal daily  lisinopril 5 milliGRAM(s) Oral daily  loratadine 10 milliGRAM(s) Oral daily  melatonin 5 milliGRAM(s) Oral at bedtime  nicotine - 21 mG/24Hr(s) Patch 1 patch Transdermal daily    MEDICATIONS  (PRN):  acetaminophen   Tablet .. 650 milliGRAM(s) Oral every 6 hours PRN Temp greater or equal to 38C (100.4F), Mild Pain (1 - 3), Moderate Pain (4 - 6)  aluminum hydroxide/magnesium hydroxide/simethicone Suspension 30 milliLiter(s) Oral every 6 hours PRN Dyspepsia  diphenhydrAMINE   Injectable 50 milliGRAM(s) IntraMuscular every 6 hours PRN Agitation  haloperidol    Injectable 5 milliGRAM(s) IntraMuscular every 6 hours PRN Agitation  LORazepam     Tablet 1 milliGRAM(s) Oral every 1 hour PRN CIWA-Ar score 8 or greater  LORazepam   Injectable 2 milliGRAM(s) IntraMuscular every 4 hours PRN Agitation  magnesium hydroxide Suspension 30 milliLiter(s) Oral daily PRN Constipation  nicotine  Polacrilex Gum 2 milliGRAM(s) Oral every 2 hours PRN nicotine craving      VITALS:  T(F): 98.1 (09-10-21 @ 12:36), Max: 98.8 (09-10-21 @ 08:58)  HR: 59 (09-10-21 @ 07:04) (59 - 68)  BP: 130/79 (09-10-21 @ 07:04) (130/79 - 159/88)  RR: 17 (09-10-21 @ 07:04) (17 - 18)  SpO2: 100% (09-10-21 @ 12:36) (100% - 100%)  Wt(kg): --      PHYSICAL EXAM:    HEENT:  pupils equal and reactive, EOMI, no oropharyngeal lesions, erythema, exudates, oral thrush  NECK:   supple, no carotid bruits, no palpable lymph nodes, no thyromegaly  CV:  +S1, +S2, regular, no murmurs or rubs  RESP:   lungs clear to auscultation bilaterally, no wheezing, rales, rhonchi, good air entry bilaterally  BREAST:  not examined  GI:  abdomen soft, non-tender, non-distended, normal BS, no bruits, no abdominal masses, no palpable masses  RECTAL:  not examined  :  not examined  MSK:   normal muscle tone, no atrophy, no rigidity, no contractions  EXT:  no clubbing, no cyanosis, no edema, no calf pain, swelling or erythema  VASCULAR:  pulses equal and symmetric in the upper and lower extremities  NEURO:  AAOX3, no focal neurological deficits, follows all commands, able to move extremities spontaneously  SKIN:  no ulcers, lesions or rashes    LABS:                            13.3   8.01  )-----------( 173      ( 09 Sep 2021 14:30 )             41.1     09-10    140  |  106  |  16  ----------------------------<  131<H>  3.5   |  27  |  0.94    Ca    8.9      10 Sep 2021 09:25  Phos  3.2     09-10  Mg     2.3     -10    TPro  7.3  /  Alb  3.9  /  TBili  0.2  /  DBili  <0.1  /  AST  15  /  ALT  27  /  AlkPhos  75  09-10    CARDIAC MARKERS ( 09 Sep 2021 20:44 )  <0.015 ng/mL / x     / x     / x     / x      CARDIAC MARKERS ( 09 Sep 2021 17:07 )  <0.015 ng/mL / x     / x     / x     / x      CARDIAC MARKERS ( 09 Sep 2021 14:30 )  <0.015 ng/mL / x     / x     / x     / x          LIVER FUNCTIONS - ( 10 Sep 2021 09:25 )  Alb: 3.9 g/dL / Pro: 7.3 gm/dL / ALK PHOS: 75 U/L / ALT: 27 U/L / AST: 15 U/L / GGT: x             Urinalysis Basic - ( 09 Sep 2021 14:30 )    Color: Yellow / Appearance: Clear / S.010 / pH: x  Gluc: x / Ketone: Negative  / Bili: Negative / Urobili: Negative mg/dL   Blood: x / Protein: Negative mg/dL / Nitrite: Negative   Leuk Esterase: Negative / RBC: x / WBC x   Sq Epi: x / Non Sq Epi: x / Bacteria: x                                    EKG:     RADIOLOGY STUDIES:      IMPRESSION:        RECOMMENDATIONS:        d/w patient, ED RN and ED physician    Time Spent:   HOSPITALIST PA CONSULT NOTE     ADMITTING DIAGNOSIS: Bipolar Disorder     HISTORY OF THE PRESENT ILLNESS:  Patient is 61 y/o F with pmhx of HTN, CAD dz s/p stent placement in , HLD, anxiety, bipolar dx, hx of ETOH and cocaine and cannabis abuse. Patient has multiple past psychiatric hospitalizations  last one  a month ago in [facility]. Patient BIB by police for psychiatric evaluation as patient was having AH and VH stating she "hears/ sees the Devil". Patient is currently homeless and states [place] the trains and was acting psychotic and the police brought her here.     On arrival to bedside, patient is very pleasant and social and conversational. Endorses no physical complaints at this time but is requesting medical marijuana despite being previously told that she can not have it while on the unit. Patient states wishes to speak to psychiatrist about her prescription. Otherwise, she denies any complaints and current AH and VH.     PAST MEDICAL HISTORY:  PAST MEDICAL & SURGICAL HISTORY:  Bipolar 1 disorder  Heart disease  Secondary hypertension  PTSD (post-traumatic stress disorder)  Anxiety  HX SA in  by OD   CAD (coronary artery disease)  Hyperlipidemia  History of appendectomy  History of lumpectomy  Perforated ulcer  History of ovarian cyst  History of bilateral breast reduction surgery  H/O angioplasty  stents placed in  &amp; 2019    FAMILY HISTORY:   non-contributory to the patient's current presentation    SOCIAL HISTORY:  endorses smoking hx since she was 16 yrs old. on and off. Current vapes for the past year and smokes cigarettes occasionally. Endorses EtOH use, hx of EToH abuse (1-3 bottles daily v. several glasses with dinner. Hx of withdrawals in the past, no seizures.  Hx of cocaine abuse, Benzo misuse, and cannabis use (has been on medical marijuana)     REVIEW OF SYSTEMS:    CONSTITUTIONAL: No weakness, fevers or chills  EYES/ENT: No visual changes;  No vertigo or throat pain   NECK: No pain or stiffness  RESPIRATORY: No cough, wheezing, hemoptysis; No shortness of breath  CARDIOVASCULAR: No chest pain or palpitations  GASTROINTESTINAL: No abdominal or epigastric pain. No nausea, vomiting, or hematemesis; No diarrhea or constipation.   GENITOURINARY: No dysuria, frequency or hematuria  NEUROLOGICAL: No numbness or weakness, no dizziness   MSK: endorses some occasional joint pain and back pain 2/2 chronic condition (herniated disc, psoriatic arthritis)   SKIN: No itching, burning, rashes, or lesions   All other review of systems is negative unless indicated above.      MEDICATIONS  (STANDING):  aspirin enteric coated 81 milliGRAM(s) Oral daily  atorvastatin 10 milliGRAM(s) Oral at bedtime  bacitracin/polymyxin B Ointment 1 Application(s) Topical three times a day  lidocaine   4% Patch 1 Patch Transdermal daily  lisinopril 5 milliGRAM(s) Oral daily  loratadine 10 milliGRAM(s) Oral daily  melatonin 5 milliGRAM(s) Oral at bedtime  nicotine - 21 mG/24Hr(s) Patch 1 patch Transdermal daily    MEDICATIONS  (PRN):  acetaminophen   Tablet .. 650 milliGRAM(s) Oral every 6 hours PRN Temp greater or equal to 38C (100.4F), Mild Pain (1 - 3), Moderate Pain (4 - 6)  aluminum hydroxide/magnesium hydroxide/simethicone Suspension 30 milliLiter(s) Oral every 6 hours PRN Dyspepsia  diphenhydrAMINE   Injectable 50 milliGRAM(s) IntraMuscular every 6 hours PRN Agitation  haloperidol    Injectable 5 milliGRAM(s) IntraMuscular every 6 hours PRN Agitation  LORazepam     Tablet 1 milliGRAM(s) Oral every 1 hour PRN CIWA-Ar score 8 or greater  LORazepam   Injectable 2 milliGRAM(s) IntraMuscular every 4 hours PRN Agitation  magnesium hydroxide Suspension 30 milliLiter(s) Oral daily PRN Constipation  nicotine  Polacrilex Gum 2 milliGRAM(s) Oral every 2 hours PRN nicotine craving      VITALS:  T(F): 98.1 (09-10-21 @ 12:36), Max: 98.8 (09-10-21 @ 08:58)  HR: 59 (09-10-21 @ 07:04) (59 - 68)  BP: 130/79 (09-10-21 @ 07:04) (130/79 - 159/88)  RR: 17 (09-10-21 @ 07:04) (17 - 18)  SpO2: 100% (09-10-21 @ 12:36) (100% - 100%)  Wt(kg): --      PHYSICAL EXAM:  GEN: Pt is of healthy size, well groomed. Alert and oriented conversational   HEENT:  pupils equal and reactive, EOMI, sclera is white.  no oropharyngeal lesions, erythema, exudates, oral thrush  NECK:   supple, no palpable lymph nodes, no thyromegaly  CV:  +S1, +S2, regular, no murmurs or rubs  RESP:   lungs clear to auscultation bilaterally, no wheezing, rales, rhonchi, good air entry bilaterally. Breathing with normal effort, good chest expansion. No signs of respiratory distress   GI:  abdomen soft, non-tender, non-distended, no abdominal masses, no palpable masses  MSK:   normal muscle tone, no atrophy, no rigidity, no contractions. able to move all four extremities without difficulty.   EXT:  no clubbing, no cyanosis, no edema, no calf pain, swelling or erythema  VASCULAR:  pulses equal and symmetric in the upper and lower extremities  NEURO:  AAOX3, no focal neurological deficits, follows all commands, able to move extremities spontaneously, normal gait. No slurred speech, speaking in full coherent sentences.   SKIN:  no ulcers, lesions or rashes. Skin is warm, moist and intact. Cap refill < 2 seconds     LABS:                          13.3   8.01  )-----------( 173      ( 09 Sep 2021 14:30 )             41.1     -10    140  |  106  |  16  ----------------------------<  131<H>  3.5   |  27  |  0.94    Ca    8.9      10 Sep 2021 09:25  Phos  3.2     -10  Mg     2.3     09-10    TPro  7.3  /  Alb  3.9  /  TBili  0.2  /  DBili  <0.1  /  AST  15  /  ALT  27  /  AlkPhos  75  -10    CARDIAC MARKERS ( 09 Sep 2021 20:44 )  <0.015 ng/mL / x     / x     / x     / x      CARDIAC MARKERS ( 09 Sep 2021 17:07 )  <0.015 ng/mL / x     / x     / x     / x      CARDIAC MARKERS ( 09 Sep 2021 14:30 )  <0.015 ng/mL / x     / x     / x     / x          LIVER FUNCTIONS - ( 10 Sep 2021 09:25 )  Alb: 3.9 g/dL / Pro: 7.3 gm/dL / ALK PHOS: 75 U/L / ALT: 27 U/L / AST: 15 U/L / GGT: x             Urinalysis Basic - ( 09 Sep 2021 14:30 )    Color: Yellow / Appearance: Clear / S.010 / pH: x  Gluc: x / Ketone: Negative  / Bili: Negative / Urobili: Negative mg/dL   Blood: x / Protein: Negative mg/dL / Nitrite: Negative   Leuk Esterase: Negative / RBC: x / WBC x   Sq Epi: x / Non Sq Epi: x / Bacteria: x      EKG:   Sinus bradycardia @ 55 bpm   QT/ QTc: 448/ 428 ms     Septal infarct , age undetermined  ST & T wave abnormality, consider inferior ischemia  Abnormal ECG  When compared with ECG of 09-SEP-2021 14:21,  Septal infarct is now Present  Nonspecific T wave abnormality, worse in Inferior leads       HOSPITALIST PA CONSULT NOTE     ADMITTING DIAGNOSIS: Bipolar Disorder     HISTORY OF THE PRESENT ILLNESS:  Patient is 61 y/o F with pmhx of HTN, CAD dz s/p stent placement in , HLD, anxiety, bipolar dx, hx of ETOH and cocaine and cannabis abuse. Patient has multiple past psychiatric hospitalizations  last one  a month ago in [facility]. Patient BIB by police for psychiatric evaluation as patient was having AH and VH stating she "hears/ sees the Devil". Patient is currently homeless and states she was on the train and was acting psychotic and the police brought her here.     On arrival to bedside, patient is very pleasant and social and conversational. Endorses no physical complaints at this time but is requesting medical marijuana despite being previously told that she can not have it while on the unit. Patient states wishes to speak to psychiatrist about her prescription. Otherwise, she denies any complaints and current AH and VH.     PAST MEDICAL HISTORY:  PAST MEDICAL & SURGICAL HISTORY:  Bipolar 1 disorder  Heart disease  Secondary hypertension  PTSD (post-traumatic stress disorder)  Anxiety  HX SA in  by OD   CAD (coronary artery disease)  Hyperlipidemia  History of appendectomy  History of lumpectomy  Perforated ulcer  History of ovarian cyst  History of bilateral breast reduction surgery  H/O angioplasty  stents placed in  &amp; 2019    FAMILY HISTORY:   non-contributory to the patient's current presentation    SOCIAL HISTORY:  endorses smoking hx since she was 16 yrs old. on and off. Current vapes for the past year and smokes cigarettes occasionally. Endorses EtOH use, hx of EToH abuse (1-3 bottles daily v. several glasses with dinner. Hx of withdrawals in the past, no seizures.  Hx of cocaine abuse, Benzo misuse, and cannabis use (has been on medical marijuana)     REVIEW OF SYSTEMS:    CONSTITUTIONAL: No weakness, fevers or chills  EYES/ENT: No visual changes;  No vertigo or throat pain   NECK: No pain or stiffness  RESPIRATORY: No cough, wheezing, hemoptysis; No shortness of breath  CARDIOVASCULAR: No chest pain or palpitations  GASTROINTESTINAL: No abdominal or epigastric pain. No nausea, vomiting, or hematemesis; No diarrhea or constipation.   GENITOURINARY: No dysuria, frequency or hematuria  NEUROLOGICAL: No numbness or weakness, no dizziness   MSK: endorses some occasional joint pain and back pain 2/2 chronic condition (herniated disc, psoriatic arthritis)   SKIN: No itching, burning, rashes, or lesions   All other review of systems is negative unless indicated above.      MEDICATIONS  (STANDING):  aspirin enteric coated 81 milliGRAM(s) Oral daily  atorvastatin 10 milliGRAM(s) Oral at bedtime  bacitracin/polymyxin B Ointment 1 Application(s) Topical three times a day  lidocaine   4% Patch 1 Patch Transdermal daily  lisinopril 5 milliGRAM(s) Oral daily  loratadine 10 milliGRAM(s) Oral daily  melatonin 5 milliGRAM(s) Oral at bedtime  nicotine - 21 mG/24Hr(s) Patch 1 patch Transdermal daily    MEDICATIONS  (PRN):  acetaminophen   Tablet .. 650 milliGRAM(s) Oral every 6 hours PRN Temp greater or equal to 38C (100.4F), Mild Pain (1 - 3), Moderate Pain (4 - 6)  aluminum hydroxide/magnesium hydroxide/simethicone Suspension 30 milliLiter(s) Oral every 6 hours PRN Dyspepsia  diphenhydrAMINE   Injectable 50 milliGRAM(s) IntraMuscular every 6 hours PRN Agitation  haloperidol    Injectable 5 milliGRAM(s) IntraMuscular every 6 hours PRN Agitation  LORazepam     Tablet 1 milliGRAM(s) Oral every 1 hour PRN CIWA-Ar score 8 or greater  LORazepam   Injectable 2 milliGRAM(s) IntraMuscular every 4 hours PRN Agitation  magnesium hydroxide Suspension 30 milliLiter(s) Oral daily PRN Constipation  nicotine  Polacrilex Gum 2 milliGRAM(s) Oral every 2 hours PRN nicotine craving      VITALS:  T(F): 98.1 (09-10-21 @ 12:36), Max: 98.8 (09-10-21 @ 08:58)  HR: 59 (09-10-21 @ 07:04) (59 - 68)  BP: 130/79 (09-10-21 @ 07:04) (130/79 - 159/88)  RR: 17 (09-10-21 @ 07:04) (17 - 18)  SpO2: 100% (09-10-21 @ 12:36) (100% - 100%)  Wt(kg): --      PHYSICAL EXAM:  GEN: Pt is of healthy size, well groomed. Alert and oriented conversational   HEENT:  pupils equal and reactive, EOMI, sclera is white.  no oropharyngeal lesions, erythema, exudates, oral thrush  NECK:   supple, no palpable lymph nodes, no thyromegaly  CV:  +S1, +S2, regular, no murmurs or rubs  RESP:   lungs clear to auscultation bilaterally, no wheezing, rales, rhonchi, good air entry bilaterally. Breathing with normal effort, good chest expansion. No signs of respiratory distress   GI:  abdomen soft, non-tender, non-distended, no abdominal masses, no palpable masses  MSK:   normal muscle tone, no atrophy, no rigidity, no contractions. able to move all four extremities without difficulty.   EXT:  no clubbing, no cyanosis, no edema, no calf pain, swelling or erythema  VASCULAR:  pulses equal and symmetric in the upper and lower extremities  NEURO:  AAOX3, no focal neurological deficits, follows all commands, able to move extremities spontaneously, normal gait. No slurred speech, speaking in full coherent sentences.   SKIN:  no ulcers, lesions or rashes. Skin is warm, moist and intact. Cap refill < 2 seconds     LABS:                          13.3   8.01  )-----------( 173      ( 09 Sep 2021 14:30 )             41.1     -10    140  |  106  |  16  ----------------------------<  131<H>  3.5   |  27  |  0.94    Ca    8.9      10 Sep 2021 09:25  Phos  3.2     -10  Mg     2.3     09-10    TPro  7.3  /  Alb  3.9  /  TBili  0.2  /  DBili  <0.1  /  AST  15  /  ALT  27  /  AlkPhos  75  -10    CARDIAC MARKERS ( 09 Sep 2021 20:44 )  <0.015 ng/mL / x     / x     / x     / x      CARDIAC MARKERS ( 09 Sep 2021 17:07 )  <0.015 ng/mL / x     / x     / x     / x      CARDIAC MARKERS ( 09 Sep 2021 14:30 )  <0.015 ng/mL / x     / x     / x     / x          LIVER FUNCTIONS - ( 10 Sep 2021 09:25 )  Alb: 3.9 g/dL / Pro: 7.3 gm/dL / ALK PHOS: 75 U/L / ALT: 27 U/L / AST: 15 U/L / GGT: x             Urinalysis Basic - ( 09 Sep 2021 14:30 )    Color: Yellow / Appearance: Clear / S.010 / pH: x  Gluc: x / Ketone: Negative  / Bili: Negative / Urobili: Negative mg/dL   Blood: x / Protein: Negative mg/dL / Nitrite: Negative   Leuk Esterase: Negative / RBC: x / WBC x   Sq Epi: x / Non Sq Epi: x / Bacteria: x      EKG:   Sinus bradycardia @ 55 bpm   QT/ QTc: 448/ 428 ms     Septal infarct , age undetermined  ST & T wave abnormality, consider inferior ischemia  Abnormal ECG  When compared with ECG of 09-SEP-2021 14:21,  Septal infarct is now Present  Nonspecific T wave abnormality, worse in Inferior leads

## 2021-09-10 NOTE — PROGRESS NOTE BEHAVIORAL HEALTH - NSBHADDHXPSYSOCFT_PSY_A_CORE
The pt. grew up in [place] . Completed her GED there. Later attended college at [employer] and only a few credits short for graduation reportedly in audio visual studies.

## 2021-09-11 DIAGNOSIS — R94.31 ABNORMAL ELECTROCARDIOGRAM [ECG] [EKG]: ICD-10-CM

## 2021-09-11 DIAGNOSIS — I10 ESSENTIAL (PRIMARY) HYPERTENSION: ICD-10-CM

## 2021-09-11 DIAGNOSIS — I25.10 ATHEROSCLEROTIC HEART DISEASE OF NATIVE CORONARY ARTERY WITHOUT ANGINA PECTORIS: ICD-10-CM

## 2021-09-11 DIAGNOSIS — E78.5 HYPERLIPIDEMIA, UNSPECIFIED: ICD-10-CM

## 2021-09-11 LAB
A1C WITH ESTIMATED AVERAGE GLUCOSE RESULT: 5.2 % — SIGNIFICANT CHANGE UP (ref 4–5.6)
ANION GAP SERPL CALC-SCNC: 7 MMOL/L — SIGNIFICANT CHANGE UP (ref 5–17)
BUN SERPL-MCNC: 18 MG/DL — SIGNIFICANT CHANGE UP (ref 7–23)
CALCIUM SERPL-MCNC: 9.3 MG/DL — SIGNIFICANT CHANGE UP (ref 8.5–10.1)
CHLORIDE SERPL-SCNC: 107 MMOL/L — SIGNIFICANT CHANGE UP (ref 96–108)
CHOLEST SERPL-MCNC: 194 MG/DL — SIGNIFICANT CHANGE UP
CO2 SERPL-SCNC: 29 MMOL/L — SIGNIFICANT CHANGE UP (ref 22–31)
CREAT SERPL-MCNC: 0.96 MG/DL — SIGNIFICANT CHANGE UP (ref 0.5–1.3)
ESTIMATED AVERAGE GLUCOSE: 103 MG/DL — SIGNIFICANT CHANGE UP (ref 68–114)
GLUCOSE SERPL-MCNC: 95 MG/DL — SIGNIFICANT CHANGE UP (ref 70–99)
HDLC SERPL-MCNC: 49 MG/DL — LOW
LIPID PNL WITH DIRECT LDL SERPL: 121 MG/DL — HIGH
MAGNESIUM SERPL-MCNC: 2.5 MG/DL — SIGNIFICANT CHANGE UP (ref 1.6–2.6)
NON HDL CHOLESTEROL: 145 MG/DL — HIGH
PHOSPHATE SERPL-MCNC: 3.6 MG/DL — SIGNIFICANT CHANGE UP (ref 2.5–4.5)
POTASSIUM SERPL-MCNC: 4.4 MMOL/L — SIGNIFICANT CHANGE UP (ref 3.5–5.3)
POTASSIUM SERPL-SCNC: 4.4 MMOL/L — SIGNIFICANT CHANGE UP (ref 3.5–5.3)
SODIUM SERPL-SCNC: 143 MMOL/L — SIGNIFICANT CHANGE UP (ref 135–145)
TRIGL SERPL-MCNC: 123 MG/DL — SIGNIFICANT CHANGE UP

## 2021-09-11 PROCEDURE — 99232 SBSQ HOSP IP/OBS MODERATE 35: CPT

## 2021-09-11 PROCEDURE — 93306 TTE W/DOPPLER COMPLETE: CPT | Mod: 26

## 2021-09-11 PROCEDURE — 99223 1ST HOSP IP/OBS HIGH 75: CPT

## 2021-09-11 RX ORDER — DIPHENHYDRAMINE HCL 50 MG
50 CAPSULE ORAL EVERY 6 HOURS
Refills: 0 | Status: DISCONTINUED | OUTPATIENT
Start: 2021-09-11 | End: 2021-09-11

## 2021-09-11 RX ORDER — ARIPIPRAZOLE 15 MG/1
2 TABLET ORAL DAILY
Refills: 0 | Status: DISCONTINUED | OUTPATIENT
Start: 2021-09-11 | End: 2021-09-12

## 2021-09-11 RX ORDER — DIPHENHYDRAMINE HCL 50 MG
50 CAPSULE ORAL ONCE
Refills: 0 | Status: COMPLETED | OUTPATIENT
Start: 2021-09-11 | End: 2021-09-11

## 2021-09-11 RX ADMIN — Medication 1 PATCH: at 09:22

## 2021-09-11 RX ADMIN — Medication 2 MILLIGRAM(S): at 20:26

## 2021-09-11 RX ADMIN — Medication 1 PATCH: at 07:58

## 2021-09-11 RX ADMIN — Medication 1 PATCH: at 19:39

## 2021-09-11 RX ADMIN — BACITRACIN AND POLYMYXIN B SULFATE 1 APPLICATION(S): 500; 10000 OINTMENT TOPICAL at 12:42

## 2021-09-11 RX ADMIN — LIDOCAINE 1 PATCH: 4 CREAM TOPICAL at 09:22

## 2021-09-11 RX ADMIN — Medication 1 PATCH: at 09:23

## 2021-09-11 RX ADMIN — BACITRACIN AND POLYMYXIN B SULFATE 1 APPLICATION(S): 500; 10000 OINTMENT TOPICAL at 09:23

## 2021-09-11 RX ADMIN — LIDOCAINE 1 PATCH: 4 CREAM TOPICAL at 19:39

## 2021-09-11 RX ADMIN — LORATADINE 10 MILLIGRAM(S): 10 TABLET ORAL at 09:22

## 2021-09-11 RX ADMIN — Medication 50 MILLIGRAM(S): at 23:17

## 2021-09-11 RX ADMIN — LIDOCAINE 1 PATCH: 4 CREAM TOPICAL at 20:27

## 2021-09-11 RX ADMIN — Medication 2 MILLIGRAM(S): at 12:46

## 2021-09-11 RX ADMIN — ATORVASTATIN CALCIUM 10 MILLIGRAM(S): 80 TABLET, FILM COATED ORAL at 20:26

## 2021-09-11 RX ADMIN — Medication 5 MILLIGRAM(S): at 20:26

## 2021-09-11 RX ADMIN — Medication 2 MILLIGRAM(S): at 09:40

## 2021-09-11 RX ADMIN — LISINOPRIL 5 MILLIGRAM(S): 2.5 TABLET ORAL at 09:22

## 2021-09-11 RX ADMIN — Medication 81 MILLIGRAM(S): at 09:22

## 2021-09-11 NOTE — CONSULT NOTE ADULT - CONSULT REQUESTED BY NAME
Dr. Green PERRLA/Anicteric conjunctivae/No drainage/Normal tympanic membranes/External ear normal/Nasal mucosa normal/Normal dentition/No oral lesions/Normal oropharynx negative

## 2021-09-11 NOTE — PROGRESS NOTE BEHAVIORAL HEALTH - NSBHFUPINTERVALHXFT_PSY_A_CORE
PT is visible on the unit, inappropriately elated, seeking "medical marijuana". Appears irritable and elated, denies mood symptoms. Denies SI and HI. She reports hearing voices and states the voices tell her "to use medical marijuana". Pt continuos to be noncompliant with psychotropics.

## 2021-09-11 NOTE — CONSULT NOTE ADULT - SUBJECTIVE AND OBJECTIVE BOX
CHIEF COMPLAINT:  police sent me to here          HPI:  59yr old female PMH HTN, HLD, CAD, stents, anxiety, bipolar, chronic back pain, herniated disc  admitted to psych unit with bipolar disorder , called for cardiology consult as patient was noted to have abnormal ekg     patient says she did have episode of chest pain few days ago , she was evaluated  at   [facility] , and  [facility] in [facility] , was sent home       Pt. reports that pain is similar to prior episode of CP in 2019 which required stents.     Pt states stressors involving multiple relocations over the past few months due to homelessness .    Patient denies any  chest pain after coming here       PAST MEDICAL & SURGICAL HISTORY:  Hyperlipidemia    CAD (coronary artery disease)    Anxiety    PTSD (post-traumatic stress disorder)    Secondary hypertension    Heart disease    Bipolar 1 disorder    H/O angioplasty  stents placed in  &amp; 2019    History of bilateral breast reduction surgery    History of ovarian cyst    Perforated ulcer    History of lumpectomy    History of appendectomy        Allergies    morphine (Nausea)  morphine (Vomiting)  Nuts (Unknown)  peanuts (Hives; Anaphylaxis)  peanuts (Rash)    Intolerances        SOCIAL HISTORY:  active smoker     FAMILY HISTORY:  FH: lung cancer  Father  of Lung Cancer @ age 55; Brother currently has lung cancer    Family history of breast cancer in mother  Mother  at age 55 of breast cancer    FHx: diabetes mellitus  sister      MEDICATIONS  (STANDING):  ARIPiprazole 2 milliGRAM(s) Oral daily  aspirin enteric coated 81 milliGRAM(s) Oral daily  atorvastatin 10 milliGRAM(s) Oral at bedtime  bacitracin/polymyxin B Ointment 1 Application(s) Topical three times a day  lidocaine   4% Patch 1 Patch Transdermal daily  lisinopril 5 milliGRAM(s) Oral daily  loratadine 10 milliGRAM(s) Oral daily  melatonin 5 milliGRAM(s) Oral at bedtime  nicotine - 21 mG/24Hr(s) Patch 1 patch Transdermal daily    MEDICATIONS  (PRN):  acetaminophen   Tablet .. 650 milliGRAM(s) Oral every 6 hours PRN Temp greater or equal to 38C (100.4F), Mild Pain (1 - 3), Moderate Pain (4 - 6)  aluminum hydroxide/magnesium hydroxide/simethicone Suspension 30 milliLiter(s) Oral every 6 hours PRN Dyspepsia  diphenhydrAMINE   Injectable 50 milliGRAM(s) IntraMuscular every 6 hours PRN Agitation  haloperidol    Injectable 5 milliGRAM(s) IntraMuscular every 6 hours PRN Agitation  LORazepam     Tablet 1 milliGRAM(s) Oral every 1 hour PRN CIWA-Ar score 8 or greater  LORazepam   Injectable 2 milliGRAM(s) IntraMuscular every 4 hours PRN Agitation  magnesium hydroxide Suspension 30 milliLiter(s) Oral daily PRN Constipation  nicotine  Polacrilex Gum 2 milliGRAM(s) Oral every 2 hours PRN nicotine craving    REVIEW OF SYSTEMS:    CONSTITUTIONAL: No weakness, fevers or chills  EYES/ENT: No visual changes;  No vertigo or throat pain   NECK: No pain or stiffness  RESPIRATORY: No cough, wheezing, hemoptysis; No shortness of breath  CARDIOVASCULAR: No chest pain or palpitations  GASTROINTESTINAL: No abdominal or epigastric pain. No nausea, vomiting, or hematemesis; No diarrhea or constipation. No melena or hematochezia.  GENITOURINARY: No dysuria, frequency or hematuria  NEUROLOGICAL: No numbness or weakness  SKIN: No itching, burning, rashes, or lesions   All other review of systems is negative unless indicated above        Vital Signs Last 24 Hrs  T(C): 37.1 (11 Sep 2021 13:04), Max: 37.1 (11 Sep 2021 13:04)  T(F): 98.8 (11 Sep 2021 13:04), Max: 98.8 (11 Sep 2021 13:04)  HR: --71  BP: --139/76  BP(mean): --  RR: 14 (11 Sep 2021 08:02) (14 - 18)  SpO2: 100% (11 Sep 2021 08:02) (100% - 100%)    I&O's Summary      PHYSICAL EXAM:    Constitutional: NAD, awake and alert, well-developed  HEENT: PERR, EOMI,  No oral cyananosis.  Neck:  supple,  No JVD  Respiratory: Breath sounds are clear bilaterally, No wheezing, rales or rhonchi  Cardiovascular: S1 and S2, regular rate and rhythm, ESM  Gastrointestinal: Bowel Sounds present, soft, nontender.   Extremities: No peripheral edema. No clubbing or cyanosis.  Vascular: 2+ peripheral pulses  Neurological: A/O x 3, no focal deficits  Musculoskeletal: no calf tenderness.  Skin: No rashes.      LABS: All Labs Reviewed:                    143  |  107  |  18  ----------------------------<  95  4.4   |  29  |  0.96    Ca    9.3      11 Sep 2021 08:24  Phos  3.6       Mg     2.5         TPro  7.3  /  Alb  3.9  /  TBili  0.2  /  DBili  <0.1  /  AST  15  /  ALT  27  /  AlkPhos  75  09-10    CARDIAC MARKERS ( 09 Sep 2021 20:44 )  <0.015 ng/mL / x     / x     / x     / x      CARDIAC MARKERS ( 09 Sep 2021 17:07 )  <0.015 ng/mL / x     / x     / x     / x          LIVER FUNCTIONS - ( 10 Sep 2021 09:25 )  Alb: 3.9 g/dL / Pro: 7.3 gm/dL / ALK PHOS: 75 U/L / ALT: 27 U/L / AST: 15 U/L / GGT: x             < from: 12 Lead ECG (21 @ 16:25) >  Sinus bradycardia  Septal infarct , age undetermined  ST & T wave abnormality, consider inferior ischemia  Abnormal ECG  When compared with ECG of 09-SEP-2021 14:21,  Septal infarct is now Present  Nonspecific T wave abnormality, worse in Inferior leads  Confirmed by NURIS RENAE PAUL ([id number]) on 9/10/2021 9:00:11 AM    < end of copied text >  < from: Cardiac Cath Lab - Adult (19 @ 10:31) >  VENTRICLES:No left ventriculogram was performed.  CORONARY VESSELS: The coronary circulation is right dominant.  LM:   --  LM: Angiography showed mild atherosclerosis with no flow limiting  lesions.  LAD:   --  Proximal LAD: There was a discrete 40 % stenosis at a site with  no prior intervention. The lesion was eccentric. There was MAGGIE grade 3  flow through the vessel (brisk flow).  --  Mid LAD: There was a tubular 10 % stenosis at the site of a prior  stent. The lesion was smoothly contoured. There was MAGGIE grade 3 flow  through the vessel (brisk flow).  --  Distal LAD: The catheter induced distal vessel spasm which resolved  with nitroglycerin.  --  D1: There was a tubular 30 % stenosis at a site with no prior  intervention. There was MAGGIE grade 3 flow through the vessel (brisk flow).  CX:   --  Circumflex: Angiography showed mild atherosclerosis with no flow  limiting lesions.  --  OM1: Angiography showed mild atherosclerosis with no flow limiting  lesions.  RCA:   --  Mid RCA: There was a tubular 30 % stenosis at the site of a  prior stent. The lesion was eccentric. There was MAGGIE grade 3 flow through  the vessel (brisk flow).  --  RPDA: Angiography showed mild atherosclerosis with no flow limiting  lesions.  --  RPLS: Angiography showed mild atherosclerosis with no flow limiting  lesions.  COMPLICATIONS: There were no complications.  DIAGNOSTIC IMPRESSIONS: Patent stents LAD and RCA  mild-moderate spasm of mid and distal RCA around the old stent, this  improved with NTG  DIAGNOSTIC RECOMMENDATIONS: Medical therapy  INTERVENTIONAL IMPRESSIONS: Patent stents LAD and RCA  mild-moderate spasm of mid and distal RCA around the old stent, this  improved with NTG  INTERVENTIONAL RECOMMENDATIONS: Medical therapy    < end of copied text >  < end of copied text >

## 2021-09-11 NOTE — CONSULT NOTE ADULT - PROBLEM SELECTOR RECOMMENDATION 2
Patient with prior multiple stents  atypical chest pain few days ago was evaluated in er  , patient did have cardiac  cath in 2019 showed patent stents , patient is not compliance to stating

## 2021-09-11 NOTE — CONSULT NOTE ADULT - PROBLEM SELECTOR RECOMMENDATION 4
patient was suggested to take statin , however she is reluctant to take medication encourage patient to quit smoking

## 2021-09-11 NOTE — CONSULT NOTE ADULT - PROBLEM SELECTOR RECOMMENDATION 9
non specific EKG changes due to underlying hypertensive heart disease , no significant change compared to prior ekg  follow up echo ,  need nuclear stress test  as patient is home less

## 2021-09-12 PROCEDURE — 99232 SBSQ HOSP IP/OBS MODERATE 35: CPT

## 2021-09-12 RX ORDER — QUETIAPINE FUMARATE 200 MG/1
50 TABLET, FILM COATED ORAL AT BEDTIME
Refills: 0 | Status: DISCONTINUED | OUTPATIENT
Start: 2021-09-12 | End: 2021-09-20

## 2021-09-12 RX ADMIN — Medication 2 MILLIGRAM(S): at 09:24

## 2021-09-12 RX ADMIN — BACITRACIN AND POLYMYXIN B SULFATE 1 APPLICATION(S): 500; 10000 OINTMENT TOPICAL at 09:21

## 2021-09-12 RX ADMIN — Medication 2 MILLIGRAM(S): at 06:24

## 2021-09-12 RX ADMIN — LISINOPRIL 5 MILLIGRAM(S): 2.5 TABLET ORAL at 09:20

## 2021-09-12 RX ADMIN — ATORVASTATIN CALCIUM 10 MILLIGRAM(S): 80 TABLET, FILM COATED ORAL at 20:42

## 2021-09-12 RX ADMIN — BACITRACIN AND POLYMYXIN B SULFATE 1 APPLICATION(S): 500; 10000 OINTMENT TOPICAL at 13:51

## 2021-09-12 RX ADMIN — Medication 1 PATCH: at 07:21

## 2021-09-12 RX ADMIN — Medication 2 MILLIGRAM(S): at 16:42

## 2021-09-12 RX ADMIN — Medication 1 PATCH: at 09:20

## 2021-09-12 RX ADMIN — Medication 1 PATCH: at 09:22

## 2021-09-12 RX ADMIN — LIDOCAINE 1 PATCH: 4 CREAM TOPICAL at 20:41

## 2021-09-12 RX ADMIN — LIDOCAINE 1 PATCH: 4 CREAM TOPICAL at 09:21

## 2021-09-12 RX ADMIN — Medication 2 MILLIGRAM(S): at 13:04

## 2021-09-12 RX ADMIN — Medication 1 PATCH: at 19:44

## 2021-09-12 RX ADMIN — Medication 81 MILLIGRAM(S): at 09:20

## 2021-09-12 RX ADMIN — BACITRACIN AND POLYMYXIN B SULFATE 1 APPLICATION(S): 500; 10000 OINTMENT TOPICAL at 20:43

## 2021-09-12 RX ADMIN — LIDOCAINE 1 PATCH: 4 CREAM TOPICAL at 19:44

## 2021-09-12 RX ADMIN — Medication 5 MILLIGRAM(S): at 20:42

## 2021-09-12 RX ADMIN — LORATADINE 10 MILLIGRAM(S): 10 TABLET ORAL at 09:21

## 2021-09-12 NOTE — PROGRESS NOTE BEHAVIORAL HEALTH - NSBHFUPINTERVALHXFT_PSY_A_CORE
PT continues to be  inappropriately elated and seeking "medical marijuana" for everting: sleep, depression, anxiety. PT does not want psychotropic meds,. She states that  Seroquel put weight on her, but not marijuana. . Denies SI and HI. She reports hearing voices Pt continuos to be noncompliant with psychotropics.

## 2021-09-12 NOTE — PROGRESS NOTE ADULT - PROBLEM SELECTOR PLAN 1
non specific EKG changes due to underlying hypertensive heart disease , no significant change compared to prior ekg    normal LVEF    will obtain chemical stress test  ( patient has back pain when she runs on treadmill )    explained to patient , nurse was present , agreed for testing

## 2021-09-12 NOTE — PROGRESS NOTE ADULT - SUBJECTIVE AND OBJECTIVE BOX
CHIEF COMPLAINT:  police sent me to here          HPI:  59yr old female PMH HTN, HLD, CAD, stents, anxiety, bipolar, chronic back pain, herniated disc  admitted to psych unit with bipolar disorder , called for cardiology consult as patient was noted to have abnormal ekg     patient says she did have episode of chest pain few days ago , she was evaluated  at   [facility] , and  [facility] in [facility] , was sent home       Pt. reports that pain is similar to prior episode of CP in 2019 which required stents.     Pt states stressors involving multiple relocations over the past few months due to homelessness .    Patient denies any  chest pain after coming here    21 Patient is feeling fine no chest pain        PAST MEDICAL & SURGICAL HISTORY:  Hyperlipidemia    CAD (coronary artery disease)    Anxiety    PTSD (post-traumatic stress disorder)    Secondary hypertension    Heart disease    Bipolar 1 disorder    H/O angioplasty  stents placed in  &amp; 2019    History of bilateral breast reduction surgery    History of ovarian cyst    Perforated ulcer    History of lumpectomy    History of appendectomy        Allergies    morphine (Nausea)  morphine (Vomiting)  Nuts (Unknown)  peanuts (Hives; Anaphylaxis)  peanuts (Rash)    Intolerances        SOCIAL HISTORY:  active smoker     FAMILY HISTORY:  FH: lung cancer  Father  of Lung Cancer @ age 55; Brother currently has lung cancer    Family history of breast cancer in mother  Mother  at age 55 of breast cancer    FHx: diabetes mellitus  sister      MEDICATIONS  (STANDING):  ARIPiprazole 2 milliGRAM(s) Oral daily  aspirin enteric coated 81 milliGRAM(s) Oral daily  atorvastatin 10 milliGRAM(s) Oral at bedtime  bacitracin/polymyxin B Ointment 1 Application(s) Topical three times a day  lidocaine   4% Patch 1 Patch Transdermal daily  lisinopril 5 milliGRAM(s) Oral daily  loratadine 10 milliGRAM(s) Oral daily  melatonin 5 milliGRAM(s) Oral at bedtime  nicotine - 21 mG/24Hr(s) Patch 1 patch Transdermal daily    MEDICATIONS  (PRN):  acetaminophen   Tablet .. 650 milliGRAM(s) Oral every 6 hours PRN Temp greater or equal to 38C (100.4F), Mild Pain (1 - 3), Moderate Pain (4 - 6)  aluminum hydroxide/magnesium hydroxide/simethicone Suspension 30 milliLiter(s) Oral every 6 hours PRN Dyspepsia  diphenhydrAMINE   Injectable 50 milliGRAM(s) IntraMuscular every 6 hours PRN Agitation  haloperidol    Injectable 5 milliGRAM(s) IntraMuscular every 6 hours PRN Agitation  LORazepam     Tablet 1 milliGRAM(s) Oral every 1 hour PRN CIWA-Ar score 8 or greater  LORazepam   Injectable 2 milliGRAM(s) IntraMuscular every 4 hours PRN Agitation  magnesium hydroxide Suspension 30 milliLiter(s) Oral daily PRN Constipation  nicotine  Polacrilex Gum 2 milliGRAM(s) Oral every 2 hours PRN nicotine craving    REVIEW OF SYSTEMS:    CONSTITUTIONAL: No weakness, fevers or chills  EYES/ENT: No visual changes;  No vertigo or throat pain   NECK: No pain or stiffness  RESPIRATORY: No cough, wheezing, hemoptysis; No shortness of breath  CARDIOVASCULAR: No chest pain or palpitations  GASTROINTESTINAL: No abdominal or epigastric pain. No nausea, vomiting, or hematemesis; No diarrhea or constipation. No melena or hematochezia.  GENITOURINARY: No dysuria, frequency or hematuria  NEUROLOGICAL: No numbness or weakness  SKIN: No itching, burning, rashes, or lesions   All other review of systems is negative unless indicated above      Vital Signs Last 24 Hrs  T(C): 36.4 (12 Sep 2021 09:15), Max: 36.4 (12 Sep 2021 09:15)  T(F): 97.5 (12 Sep 2021 09:15), Max: 97.5 (12 Sep 2021 09:15)  HR: 60   BP: --131/78  BP(mean): --  RR: --  SpO2: 100% (12 Sep 2021 09:15) (100% - 100%)    I&O's Summary    PHYSICAL EXAM:    Constitutional: NAD, awake and alert, well-developed  HEENT: PERR, EOMI,  No oral cyananosis.  Neck:  supple,  No JVD  Respiratory: Breath sounds are clear bilaterally, No wheezing, rales or rhonchi  Cardiovascular: S1 and S2, regular rate and rhythm, ESM  Gastrointestinal: Bowel Sounds present, soft, nontender.   Extremities: No peripheral edema. No clubbing or cyanosis.  Vascular: 2+ peripheral pulses  Neurological: A/O x 3, no focal deficits  Musculoskeletal: no calf tenderness.  Skin: No rashes.      LABS: All Labs Reviewed:                    143  |  107  |  18  ----------------------------<  95  4.4   |  29  |  0.96    Ca    9.3      11 Sep 2021 08:24  Phos  3.6       Mg     2.5         TPro  7.3  /  Alb  3.9  /  TBili  0.2  /  DBili  <0.1  /  AST  15  /  ALT  27  /  AlkPhos  75  09-10    CARDIAC MARKERS ( 09 Sep 2021 20:44 )  <0.015 ng/mL / x     / x     / x     / x      CARDIAC MARKERS ( 09 Sep 2021 17:07 )  <0.015 ng/mL / x     / x     / x     / x          LIVER FUNCTIONS - ( 10 Sep 2021 09:25 )  Alb: 3.9 g/dL / Pro: 7.3 gm/dL / ALK PHOS: 75 U/L / ALT: 27 U/L / AST: 15 U/L / GGT: x           < from: TTE Echo Complete w/o Contrast w/ Doppler (21 @ 13:40) >     Estimated left ventricular ejection fraction is 60-65 %.   The left ventricle is normal in size, wall thickness, wall motion and   contractility as seen in limited views.   The left atrium is mildly dilated.   Fibrocalcific changes noted to the Aortic valve leaflets with preserved   leaflet excursion.   Mild (1+) aortic regurgitation is present.   Normal appearingmitral valve structure.   Mild to Moderate mitral regurgitation is present.   Normal appearing tricuspid valve structure.   Mild to Moderate Tricuspid regurgitation is present.   Mild pulmonary hypertension.   Mild pulmonic valvular regurgitation (1+) is present.   Pulmonic valve not well seen.      < end of copied text >      < from: 12 Lead ECG (21 @ 16:25) >  Sinus bradycardia  Septal infarct , age undetermined  ST & T wave abnormality, consider inferior ischemia  Abnormal ECG  When compared with ECG of 09-SEP-2021 14:21,  Septal infarct is now Present  Nonspecific T wave abnormality, worse in Inferior leads  Confirmed by NURIS RENAE PAUL ([id number]) on 9/10/2021 9:00:11 AM    < end of copied text >  < from: Cardiac Cath Lab - Adult (19 @ 10:31) >  VENTRICLES:No left ventriculogram was performed.  CORONARY VESSELS: The coronary circulation is right dominant.  LM:   --  LM: Angiography showed mild atherosclerosis with no flow limiting  lesions.  LAD:   --  Proximal LAD: There was a discrete 40 % stenosis at a site with  no prior intervention. The lesion was eccentric. There was MAGGIE grade 3  flow through the vessel (brisk flow).  --  Mid LAD: There was a tubular 10 % stenosis at the site of a prior  stent. The lesion was smoothly contoured. There was MAGGIE grade 3 flow  through the vessel (brisk flow).  --  Distal LAD: The catheter induced distal vessel spasm which resolved  with nitroglycerin.  --  D1: There was a tubular 30 % stenosis at a site with no prior  intervention. There was MAGGIE grade 3 flow through the vessel (brisk flow).  CX:   --  Circumflex: Angiography showed mild atherosclerosis with no flow  limiting lesions.  --  OM1: Angiography showed mild atherosclerosis with no flow limiting  lesions.  RCA:   --  Mid RCA: There was a tubular 30 % stenosis at the site of a  prior stent. The lesion was eccentric. There was MAGGIE grade 3 flow through  the vessel (brisk flow).  --  RPDA: Angiography showed mild atherosclerosis with no flow limiting  lesions.  --  RPLS: Angiography showed mild atherosclerosis with no flow limiting  lesions.  COMPLICATIONS: There were no complications.  DIAGNOSTIC IMPRESSIONS: Patent stents LAD and RCA  mild-moderate spasm of mid and distal RCA around the old stent, this  improved with NTG  DIAGNOSTIC RECOMMENDATIONS: Medical therapy  INTERVENTIONAL IMPRESSIONS: Patent stents LAD and RCA  mild-moderate spasm of mid and distal RCA around the old stent, this  improved with NTG  INTERVENTIONAL RECOMMENDATIONS: Medical therapy    < end of copied text >  < end of copied text >

## 2021-09-13 PROCEDURE — 93016 CV STRESS TEST SUPVJ ONLY: CPT

## 2021-09-13 PROCEDURE — 93018 CV STRESS TEST I&R ONLY: CPT

## 2021-09-13 PROCEDURE — 78452 HT MUSCLE IMAGE SPECT MULT: CPT | Mod: 26

## 2021-09-13 PROCEDURE — 99231 SBSQ HOSP IP/OBS SF/LOW 25: CPT

## 2021-09-13 PROCEDURE — 99232 SBSQ HOSP IP/OBS MODERATE 35: CPT

## 2021-09-13 RX ORDER — IBUPROFEN 200 MG
600 TABLET ORAL EVERY 8 HOURS
Refills: 0 | Status: DISCONTINUED | OUTPATIENT
Start: 2021-09-13 | End: 2021-09-20

## 2021-09-13 RX ORDER — REGADENOSON 0.08 MG/ML
0.4 INJECTION, SOLUTION INTRAVENOUS ONCE
Refills: 0 | Status: COMPLETED | OUTPATIENT
Start: 2021-09-13 | End: 2021-09-13

## 2021-09-13 RX ADMIN — BACITRACIN AND POLYMYXIN B SULFATE 1 APPLICATION(S): 500; 10000 OINTMENT TOPICAL at 12:08

## 2021-09-13 RX ADMIN — Medication 2 MILLIGRAM(S): at 15:30

## 2021-09-13 RX ADMIN — Medication 81 MILLIGRAM(S): at 11:52

## 2021-09-13 RX ADMIN — LIDOCAINE 1 PATCH: 4 CREAM TOPICAL at 21:39

## 2021-09-13 RX ADMIN — Medication 2 MILLIGRAM(S): at 11:56

## 2021-09-13 RX ADMIN — Medication 600 MILLIGRAM(S): at 17:52

## 2021-09-13 RX ADMIN — Medication 1 PATCH: at 21:39

## 2021-09-13 RX ADMIN — Medication 2 MILLIGRAM(S): at 19:17

## 2021-09-13 RX ADMIN — Medication 5 MILLIGRAM(S): at 20:59

## 2021-09-13 RX ADMIN — REGADENOSON 0.4 MILLIGRAM(S): 0.08 INJECTION, SOLUTION INTRAVENOUS at 10:15

## 2021-09-13 RX ADMIN — LIDOCAINE 1 PATCH: 4 CREAM TOPICAL at 11:54

## 2021-09-13 RX ADMIN — LORATADINE 10 MILLIGRAM(S): 10 TABLET ORAL at 11:52

## 2021-09-13 RX ADMIN — BACITRACIN AND POLYMYXIN B SULFATE 1 APPLICATION(S): 500; 10000 OINTMENT TOPICAL at 21:00

## 2021-09-13 RX ADMIN — ATORVASTATIN CALCIUM 10 MILLIGRAM(S): 80 TABLET, FILM COATED ORAL at 21:00

## 2021-09-13 RX ADMIN — Medication 1 PATCH: at 11:50

## 2021-09-13 RX ADMIN — LISINOPRIL 5 MILLIGRAM(S): 2.5 TABLET ORAL at 11:52

## 2021-09-13 NOTE — PROGRESS NOTE BEHAVIORAL HEALTH - NSBHFUPINTERVALHXFT_PSY_A_CORE
Pt seen in the day room.  Pt continues to appear disheveled with poor attention to grooming and to her ADLs. 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 "   " I just need you to have my medical marijuana delivered to me here. Why can't you? My job is to keep pushing you to have it delivered here.' The pt made good eye contact  with inappropriate smiling affect and  somewhat elated mood  with underlying irritability easily appearing. The pt's speech was rapid fire, near pressured and difficult to interrupt. Non goal directed and with flight of ideas. The pt 's thinking appeared quite illogical with ongoing paranoid, grandiose and persecutory delusional thinking. The pt currently denied SI /HI but did endorse recent AH and VH as above. The pt's judgment appears quite impaired with little insight into the nature and severity of her presumptive schizoaffective disorder and reported h/o ETOH use d/o and CBD  . The pt remains vehemently opposed to any trials of any antipsychotic or mood stabilizing medications and appeared to express pity towards those medical professionals who " always look to these drugs to help when all I need is my medical marijuana and I'm fine."

## 2021-09-13 NOTE — PROGRESS NOTE ADULT - PROBLEM SELECTOR PLAN 1
non specific EKG changes due to underlying hypertensive heart disease , no significant change compared to prior ekg    normal LVEF    Stress test today w/ no ischemia  - of note also had stress 12/'20 which was also neg for ischemia.

## 2021-09-13 NOTE — PROGRESS NOTE ADULT - SUBJECTIVE AND OBJECTIVE BOX
CHIEF COMPLAINT:  police sent me to here          HPI:  59yr old female PMH HTN, HLD, CAD, stents, anxiety, bipolar, chronic back pain, herniated disc  admitted to psych unit with bipolar disorder , called for cardiology consult as patient was noted to have abnormal ekg     patient says she did have episode of chest pain few days ago , she was evaluated  at   [facility] , and  [facility] in [facility] , was sent home       Pt. reports that pain is similar to prior episode of CP in 2019 which required stents.     Pt states stressors involving multiple relocations over the past few months due to homelessness .    Patient denies any  chest pain after coming here     9/13/'21: seen in stress lab no complaints overnight.      MEDICATIONS:  OUTPATIENT  Home Medications:      INPATIENT  MEDICATIONS  (STANDING):  aspirin enteric coated 81 milliGRAM(s) Oral daily  atorvastatin 10 milliGRAM(s) Oral at bedtime  bacitracin/polymyxin B Ointment 1 Application(s) Topical three times a day  lidocaine   4% Patch 1 Patch Transdermal daily  lisinopril 5 milliGRAM(s) Oral daily  loratadine 10 milliGRAM(s) Oral daily  melatonin 5 milliGRAM(s) Oral at bedtime  nicotine - 21 mG/24Hr(s) Patch 1 patch Transdermal daily  QUEtiapine 50 milliGRAM(s) Oral at bedtime    MEDICATIONS  (PRN):  acetaminophen   Tablet .. 650 milliGRAM(s) Oral every 6 hours PRN Temp greater or equal to 38C (100.4F), Mild Pain (1 - 3), Moderate Pain (4 - 6)  aluminum hydroxide/magnesium hydroxide/simethicone Suspension 30 milliLiter(s) Oral every 6 hours PRN Dyspepsia  diphenhydrAMINE   Injectable 50 milliGRAM(s) IntraMuscular every 6 hours PRN Agitation  haloperidol    Injectable 5 milliGRAM(s) IntraMuscular every 6 hours PRN Agitation  LORazepam     Tablet 1 milliGRAM(s) Oral every 1 hour PRN CIWA-Ar score 8 or greater  LORazepam   Injectable 2 milliGRAM(s) IntraMuscular every 4 hours PRN Agitation  magnesium hydroxide Suspension 30 milliLiter(s) Oral daily PRN Constipation  nicotine  Polacrilex Gum 2 milliGRAM(s) Oral every 2 hours PRN nicotine craving          Vital Signs Last 24 Hrs  T(C): 36.5 (13 Sep 2021 08:31), Max: 36.5 (13 Sep 2021 08:31)  T(F): 97.7 (13 Sep 2021 08:31), Max: 97.7 (13 Sep 2021 08:31)  HR: --  BP: --  BP(mean): --  RR: 12 (13 Sep 2021 08:31) (12 - 12)  SpO2: 99% (13 Sep 2021 08:31) (99% - 99%)Daily     Daily I&O's Summary      I&O's Detail      I&O's Summary      PHYSICAL EXAM:    Constitutional: NAD, awake and alert, well-developed  HEENT: PERR, EOMI,  No oral cyananosis.  Neck:  supple,  No JVD  Respiratory: Breath sounds are clear bilaterally, No wheezing, rales or rhonchi  Cardiovascular: S1 and S2, regular rate and rhythm, ESM  Gastrointestinal: Bowel Sounds present, soft, nontender.   Extremities: No peripheral edema. No clubbing or cyanosis.  Vascular: 2+ peripheral pulses  Neurological: A/O x 3, no focal deficits  Musculoskeletal: no calf tenderness.  Skin: No rashes.    LABS: All Labs Reviewed:    11 Sep 2021 08:24    143    |  107    |  18     ----------------------------<  95     4.4     |  29     |  0.96     Ca    9.3        11 Sep 2021 08:24  Phos  3.6       11 Sep 2021 08:24  Mg     2.5       11 Sep 2021 08:24            Blood Culture:     09-11 @ 08:24  TSH: 1.02      ===============================  CARDIAC MARKERS ( 09 Sep 2021 20:44 )  <0.015 ng/mL / x     / x     / x     / x      CARDIAC MARKERS ( 09 Sep 2021 17:07 )  <0.015 ng/mL / x     / x     / x     / x          LIVER FUNCTIONS - ( 10 Sep 2021 09:25 )  Alb: 3.9 g/dL / Pro: 7.3 gm/dL / ALK PHOS: 75 U/L / ALT: 27 U/L / AST: 15 U/L / GGT: x             < from: 12 Lead ECG (09.09.21 @ 16:25) >  Sinus bradycardia  Septal infarct , age undetermined  ST & T wave abnormality, consider inferior ischemia  Abnormal ECG  When compared with ECG of 09-SEP-2021 14:21,  Septal infarct is now Present  Nonspecific T wave abnormality, worse in Inferior leads  Confirmed by NURIS RENAE PAUL ([id number]) on 9/10/2021 9:00:11 AM    < end of copied text >  < from: Cardiac Cath Lab - Adult (09.09.19 @ 10:31) >  VENTRICLES:No left ventriculogram was performed.  CORONARY VESSELS: The coronary circulation is right dominant.  LM:   --  LM: Angiography showed mild atherosclerosis with no flow limiting  lesions.  LAD:   --  Proximal LAD: There was a discrete 40 % stenosis at a site with  no prior intervention. The lesion was eccentric. There was MAGGIE grade 3  flow through the vessel (brisk flow).  --  Mid LAD: There was a tubular 10 % stenosis at the site of a prior  stent. The lesion was smoothly contoured. There was MAGGIE grade 3 flow  through the vessel (brisk flow).  --  Distal LAD: The catheter induced distal vessel spasm which resolved  with nitroglycerin.  --  D1: There was a tubular 30 % stenosis at a site with no prior  intervention. There was MAGGIE grade 3 flow through the vessel (brisk flow).  CX:   --  Circumflex: Angiography showed mild atherosclerosis with no flow  limiting lesions.  --  OM1: Angiography showed mild atherosclerosis with no flow limiting  lesions.  RCA:   --  Mid RCA: There was a tubular 30 % stenosis at the site of a  prior stent. The lesion was eccentric. There was MAGGIE grade 3 flow through  the vessel (brisk flow).  --  RPDA: Angiography showed mild atherosclerosis with no flow limiting  lesions.  --  RPLS: Angiography showed mild atherosclerosis with no flow limiting  lesions.  COMPLICATIONS: There were no complications.  DIAGNOSTIC IMPRESSIONS: Patent stents LAD and RCA  mild-moderate spasm of mid and distal RCA around the old stent, this  improved with NTG  DIAGNOSTIC RECOMMENDATIONS: Medical therapy  INTERVENTIONAL IMPRESSIONS: Patent stents LAD and RCA  mild-moderate spasm of mid and distal RCA around the old stent, this  improved with NTG  INTERVENTIONAL RECOMMENDATIONS: Medical therapy    < end of copied text >  < end of copied text >    ==============================    < from: NM Nuclear Stress Pharmacologic Multiple (09.13.21 @ 11:24) >  IMPRESSION: Normal SPECT Myocardial Perfusion Imaging.  No scan evidence of reversible or fixed perfusion defects.  Normal left ventricular contractility with an ejection fraction of 68% (Normal: 50% or greater).  No regional wall motion abnormalities.  Findings are not significantly changed compared to the previous study of 12/30/2020.  Please refer to cardiac stress test report for dosage of Regadenoson administered, EKG findings and symptoms during the procedure.    --- End of Report ---  ARLENE CRYSTAL MD; Attending Nuclear Medicine  This document has been electronically signed. Sep 13 2021 11:44AM    < end of copied text >    < from: NM Nuclear Stress Pharmacologic Multiple (12.30.20 @ 10:36) >  IMPRESSION: Normal SPECT Myocardial Perfusion Imaging.  No scan evidence of reversible or fixed perfusion defects.  Normal left ventricular contractility with an ejection fraction of 67% (Normal: 50% or greater).  No regional wall motion abnormalities.  Please refer to cardiac stress test report for Regadenoson dose administered, EKG findings and symptoms during the procedure.      VIOLET BOB MD; Attending Nuclear Medicine  This document has been electronically signed. Dec 30 2020  3:45PM    < end of copied text >                  Ajay Foy M.D.  Cardiology, [facility]  Cell: [phone]  Offices:   [phone] ([facility] Office)  [phone] ([facility] Office)

## 2021-09-13 NOTE — PROGRESS NOTE BEHAVIORAL HEALTH - OTHER
variable paranoid, grandiose, somatic, Orthodox, persecutory delusional thinking superficially cooperative but refusing to engage in any meaningful treatment of her affective psychosis intense but less overtly hostile " I just need my CBT . I have a prescription for medical marijuana. You can have it delivered to me here. Why not?"

## 2021-09-14 ENCOUNTER — RESULT CHARGE (OUTPATIENT)
Age: 61
End: 2021-09-14

## 2021-09-14 PROCEDURE — 99231 SBSQ HOSP IP/OBS SF/LOW 25: CPT

## 2021-09-14 PROCEDURE — 99232 SBSQ HOSP IP/OBS MODERATE 35: CPT

## 2021-09-14 RX ADMIN — Medication 2 MILLIGRAM(S): at 21:19

## 2021-09-14 RX ADMIN — Medication 2 MILLIGRAM(S): at 16:43

## 2021-09-14 RX ADMIN — Medication 81 MILLIGRAM(S): at 09:05

## 2021-09-14 RX ADMIN — Medication 600 MILLIGRAM(S): at 09:06

## 2021-09-14 RX ADMIN — Medication 1 PATCH: at 09:18

## 2021-09-14 RX ADMIN — BACITRACIN AND POLYMYXIN B SULFATE 1 APPLICATION(S): 500; 10000 OINTMENT TOPICAL at 21:02

## 2021-09-14 RX ADMIN — LIDOCAINE 1 PATCH: 4 CREAM TOPICAL at 09:16

## 2021-09-14 RX ADMIN — LORATADINE 10 MILLIGRAM(S): 10 TABLET ORAL at 09:06

## 2021-09-14 RX ADMIN — Medication 1 PATCH: at 09:16

## 2021-09-14 RX ADMIN — LIDOCAINE 1 PATCH: 4 CREAM TOPICAL at 21:03

## 2021-09-14 RX ADMIN — BACITRACIN AND POLYMYXIN B SULFATE 1 APPLICATION(S): 500; 10000 OINTMENT TOPICAL at 13:02

## 2021-09-14 RX ADMIN — Medication 1 PATCH: at 19:44

## 2021-09-14 RX ADMIN — Medication 2 MILLIGRAM(S): at 14:31

## 2021-09-14 RX ADMIN — Medication 2 MILLIGRAM(S): at 06:45

## 2021-09-14 RX ADMIN — LIDOCAINE 1 PATCH: 4 CREAM TOPICAL at 19:44

## 2021-09-14 RX ADMIN — Medication 2 MILLIGRAM(S): at 18:59

## 2021-09-14 RX ADMIN — ATORVASTATIN CALCIUM 10 MILLIGRAM(S): 80 TABLET, FILM COATED ORAL at 21:02

## 2021-09-14 RX ADMIN — Medication 2 MILLIGRAM(S): at 12:37

## 2021-09-14 RX ADMIN — BACITRACIN AND POLYMYXIN B SULFATE 1 APPLICATION(S): 500; 10000 OINTMENT TOPICAL at 09:15

## 2021-09-14 RX ADMIN — Medication 1 PATCH: at 09:06

## 2021-09-14 RX ADMIN — Medication 2 MILLIGRAM(S): at 09:10

## 2021-09-14 RX ADMIN — Medication 5 MILLIGRAM(S): at 21:02

## 2021-09-14 RX ADMIN — LISINOPRIL 5 MILLIGRAM(S): 2.5 TABLET ORAL at 09:05

## 2021-09-14 NOTE — PROGRESS NOTE BEHAVIORAL HEALTH - NSBHCHARTREVIEWLAB_PSY_A_CORE FT
CBC Full  -  ( 09 Sep 2021 14:30 )  WBC Count : 8.01 K/uL  RBC Count : 4.20 M/uL  Hemoglobin : 13.3 g/dL  Hematocrit : 41.1 %  Platelet Count - Automated : 173 K/uL  Mean Cell Volume : 97.9 fl  Mean Cell Hemoglobin : 31.7 pg  Mean Cell Hemoglobin Concentration : 32.4 gm/dL  Auto Neutrophil # : 3.84 K/uL  Auto Lymphocyte # : 3.04 K/uL  Auto Monocyte # : 0.91 K/uL  Auto Eosinophil # : 0.16 K/uL  Auto Basophil # : 0.04 K/uL  Auto Neutrophil % : 47.9 %  Auto Lymphocyte % : 38.0 %  Auto Monocyte % : 11.4 %  Auto Eosinophil % : 2.0 %  Auto Basophil % : 0.5 %    09-10    140  |  106  |  16  ----------------------------<  131<H>  3.5   |  27  |  0.94    Ca    8.9      10 Sep 2021 09:25  Phos  3.2     09-10  Mg     2.3     09-10    TPro  7.3  /  Alb  3.9  /  TBili  0.2  /  DBili  <0.1  /  AST  15  /  ALT  27  /  AlkPhos  75  09-10      Urinalysis Basic - ( 09 Sep 2021 14:30 )    Color: Yellow / Appearance: Clear / S.010 / pH: x  Gluc: x / Ketone: Negative  / Bili: Negative / Urobili: Negative mg/dL   Blood: x / Protein: Negative mg/dL / Nitrite: Negative   Leuk Esterase: Negative / RBC: x / WBC x   Sq Epi: x / Non Sq Epi: x / Bacteria: x
13.3   8.01  )-----------( 173      ( 09 Sep 2021 14:30 )             41.1   09-11    143  |  107  |  18  ----------------------------<  95  4.4   |  29  |  0.96    Ca    9.3      11 Sep 2021 08:24  Phos  3.6     09-11  Mg     2.5     09-11    TPro  7.3  /  Alb  3.9  /  TBili  0.2  /  DBili  <0.1  /  AST  15  /  ALT  27  /  AlkPhos  75  09-10

## 2021-09-14 NOTE — PROGRESS NOTE BEHAVIORAL HEALTH - NSBHFUPINTERVALCCFT_PSY_A_CORE
" How are you today? I'm doing well, although I could use my medical marijuana. I have a prescription for it, you know. I still don't know why the hospital doesn't it or why it can't be delivered here." " How are you today? I'm doing well, although I could use my medical marijuana. I have a prescription for it, you know. I still don't know why the hospital doesn't it or why it can't be delivered here."    THOMASWA discontinued as the pt has not been scoring

## 2021-09-14 NOTE — PROGRESS NOTE BEHAVIORAL HEALTH - NSBHFUPINTERVALHXFT_PSY_A_CORE
Pt seen . The pt remains gregarious and chatty with almost all peers as the pt continues somewhat intrusive  and continues to offer usually unsolicited advice to all and sundry.  Pt continues to appear somewhat disheveled with poor attention to grooming and to her ADLs.  " The pt is eating and sleeping well and is now becoming compliant with her Lisinopril and her Atorvastatin for treatment of her HTN and hyperlipidemia, respectively, though the pt continues to refuse any and all antipsychotic medications recommended to her in an effort to alleviate the pt's ongoing and seemingly entrenched affective psychosis. The pt continues to attend therapy groups and offers support to other group members while remaining uncharacteristically silent as to her own trials and tribulations.     The pt's speech continues near pressured and remains frequently  difficult to interrupt. The pt 's thinking appeared quite illogical with ongoing paranoid, grandiose and persecutory delusional thinking. The pt currently denied SI /HI but did endorse recent AH and VH as above. The pt's judgment appears quite impaired with little insight into the nature and severity of her presumptive schizoaffective disorder and reported h/o ETOH use d/o and CBD  . As above, the  pt remains vehemently opposed to any trials of any antipsychotic or mood stabilizing medications and appeared to express pity towards those medical professionals who " always look to these drugs to help when all I need is my medical marijuana and I'm fine."

## 2021-09-14 NOTE — PROGRESS NOTE BEHAVIORAL HEALTH - OTHER
superficially cooperative but refusing to engage in any meaningful treatment of her affective psychosis intense but less overtly hostile " I just need my CBT . I have a prescription for medical marijuana. You can have it delivered to me here. Why not?" the pt remains intrusive with poor boundaries variable paranoid, grandiose, somatic, Hoahaoism, persecutory delusional thinking

## 2021-09-14 NOTE — PROGRESS NOTE ADULT - SUBJECTIVE AND OBJECTIVE BOX
CHIEF COMPLAINT:  police sent me to here          HPI:  59yr old female PMH HTN, HLD, CAD, stents, anxiety, bipolar, chronic back pain, herniated disc  admitted to psych unit with bipolar disorder , called for cardiology consult as patient was noted to have abnormal ekg     patient says she did have episode of chest pain few days ago , she was evaluated  at   [facility] , and  [facility] in [facility] , was sent home       Pt. reports that pain is similar to prior episode of CP in 2019 which required stents.     Pt states stressors involving multiple relocations over the past few months due to homelessness .    Patient denies any  chest pain after coming here     9/13/'21: seen in stress lab no complaints overnight.  9/14/'21: discussed results of cardiac testing.    INPATIENT  MEDICATIONS  (STANDING):  aspirin enteric coated 81 milliGRAM(s) Oral daily  atorvastatin 10 milliGRAM(s) Oral at bedtime  bacitracin/polymyxin B Ointment 1 Application(s) Topical three times a day  lidocaine   4% Patch 1 Patch Transdermal daily  lisinopril 5 milliGRAM(s) Oral daily  loratadine 10 milliGRAM(s) Oral daily  melatonin 5 milliGRAM(s) Oral at bedtime  nicotine - 21 mG/24Hr(s) Patch 1 patch Transdermal daily  QUEtiapine 50 milliGRAM(s) Oral at bedtime    MEDICATIONS  (PRN):  acetaminophen   Tablet .. 650 milliGRAM(s) Oral every 6 hours PRN Temp greater or equal to 38C (100.4F), Mild Pain (1 - 3), Moderate Pain (4 - 6)  aluminum hydroxide/magnesium hydroxide/simethicone Suspension 30 milliLiter(s) Oral every 6 hours PRN Dyspepsia  diphenhydrAMINE   Injectable 50 milliGRAM(s) IntraMuscular every 6 hours PRN Agitation  haloperidol    Injectable 5 milliGRAM(s) IntraMuscular every 6 hours PRN Agitation  ibuprofen  Tablet. 600 milliGRAM(s) Oral every 8 hours PRN Temp greater or equal to 38C (100.4F), Mild Pain (1 - 3), Moderate Pain (4 - 6)  LORazepam     Tablet 1 milliGRAM(s) Oral every 1 hour PRN CIWA-Ar score 8 or greater  LORazepam   Injectable 2 milliGRAM(s) IntraMuscular every 4 hours PRN Agitation  magnesium hydroxide Suspension 30 milliLiter(s) Oral daily PRN Constipation  nicotine  Polacrilex Gum 2 milliGRAM(s) Oral every 2 hours PRN nicotine craving    Vital Signs Last 24 Hrs  T(C): 36.9 (14 Sep 2021 12:47), Max: 36.9 (14 Sep 2021 12:47)  T(F): 98.4 (14 Sep 2021 12:47), Max: 98.4 (14 Sep 2021 12:47)  HR: --  BP: --  BP(mean): --  RR: 14 (14 Sep 2021 12:47) (14 - 18)  SpO2: 97% (14 Sep 2021 12:47) (97% - 100%)Daily     Daily I&O's Summary    PHYSICAL EXAM:    Constitutional: NAD, awake and alert, well-developed  HEENT: PERR, EOMI,  No oral cyanosis.  Neck:  supple,  No JVD  Respiratory: Breath sounds are clear bilaterally, No wheezing, rales or rhonchi  Cardiovascular: S1 and S2, regular rate and rhythm, no Murmurs, gallops or rubs  Gastrointestinal: Bowel Sounds present, soft, nontender.   Extremities: No peripheral edema. No clubbing or cyanosis.  Vascular: 2+ peripheral pulses  Neurological: A/O x 3, no focal deficits  Musculoskeletal: no calf tenderness.  Skin: No rashes.      LABS: All Labs Reviewed:    ===============================  CARDIAC MARKERS ( 09 Sep 2021 20:44 )  <0.015 ng/mL / x     / x     / x     / x      CARDIAC MARKERS ( 09 Sep 2021 17:07 )  <0.015 ng/mL / x     / x     / x     / x          LIVER FUNCTIONS - ( 10 Sep 2021 09:25 )  Alb: 3.9 g/dL / Pro: 7.3 gm/dL / ALK PHOS: 75 U/L / ALT: 27 U/L / AST: 15 U/L / GGT: x             < from: 12 Lead ECG (09.09.21 @ 16:25) >  Sinus bradycardia  Septal infarct , age undetermined  ST & T wave abnormality, consider inferior ischemia  Abnormal ECG  When compared with ECG of 09-SEP-2021 14:21,  Septal infarct is now Present  Nonspecific T wave abnormality, worse in Inferior leads  Confirmed by NURIS RENAE PAUL ([id number]) on 9/10/2021 9:00:11 AM    < end of copied text >  < from: Cardiac Cath Lab - Adult (09.09.19 @ 10:31) >  VENTRICLES:No left ventriculogram was performed.  CORONARY VESSELS: The coronary circulation is right dominant.  LM:   --  LM: Angiography showed mild atherosclerosis with no flow limiting  lesions.  LAD:   --  Proximal LAD: There was a discrete 40 % stenosis at a site with  no prior intervention. The lesion was eccentric. There was MAGGIE grade 3  flow through the vessel (brisk flow).  --  Mid LAD: There was a tubular 10 % stenosis at the site of a prior  stent. The lesion was smoothly contoured. There was MAGGIE grade 3 flow  through the vessel (brisk flow).  --  Distal LAD: The catheter induced distal vessel spasm which resolved  with nitroglycerin.  --  D1: There was a tubular 30 % stenosis at a site with no prior  intervention. There was MAGGIE grade 3 flow through the vessel (brisk flow).  CX:   --  Circumflex: Angiography showed mild atherosclerosis with no flow  limiting lesions.  --  OM1: Angiography showed mild atherosclerosis with no flow limiting  lesions.  RCA:   --  Mid RCA: There was a tubular 30 % stenosis at the site of a  prior stent. The lesion was eccentric. There was MAGGIE grade 3 flow through  the vessel (brisk flow).  --  RPDA: Angiography showed mild atherosclerosis with no flow limiting  lesions.  --  RPLS: Angiography showed mild atherosclerosis with no flow limiting  lesions.  COMPLICATIONS: There were no complications.  DIAGNOSTIC IMPRESSIONS: Patent stents LAD and RCA  mild-moderate spasm of mid and distal RCA around the old stent, this  improved with NTG  DIAGNOSTIC RECOMMENDATIONS: Medical therapy  INTERVENTIONAL IMPRESSIONS: Patent stents LAD and RCA  mild-moderate spasm of mid and distal RCA around the old stent, this  improved with NTG  INTERVENTIONAL RECOMMENDATIONS: Medical therapy    < end of copied text >  < end of copied text >    ==============================    < from: NM Nuclear Stress Pharmacologic Multiple (09.13.21 @ 11:24) >  IMPRESSION: Normal SPECT Myocardial Perfusion Imaging.  No scan evidence of reversible or fixed perfusion defects.  Normal left ventricular contractility with an ejection fraction of 68% (Normal: 50% or greater).  No regional wall motion abnormalities.  Findings are not significantly changed compared to the previous study of 12/30/2020.  Please refer to cardiac stress test report for dosage of Regadenoson administered, EKG findings and symptoms during the procedure.    --- End of Report ---  ARLENE CRYSTAL MD; Attending Nuclear Medicine  This document has been electronically signed. Sep 13 2021 11:44AM    < end of copied text >    < from: NM Nuclear Stress Pharmacologic Multiple (12.30.20 @ 10:36) >  IMPRESSION: Normal SPECT Myocardial Perfusion Imaging.  No scan evidence of reversible or fixed perfusion defects.  Normal left ventricular contractility with an ejection fraction of 67% (Normal: 50% or greater).  No regional wall motion abnormalities.  Please refer to cardiac stress test report for Regadenoson dose administered, EKG findings and symptoms during the procedure.      VIOLET BOB MD; Attending Nuclear Medicine  This document has been electronically signed. Dec 30 2020  3:45PM      < from: TTE Echo Complete w/o Contrast w/ Doppler (09.11.21 @ 13:40) >     Estimated left ventricular ejection fraction is 60-65 %.   The left ventricle is normal in size, wall thickness, wall motion and   contractility as seen in limited views.   The left atrium is mildly dilated.   Fibrocalcific changes noted to the Aortic valve leaflets with preserved   leaflet excursion.   Mild (1+) aortic regurgitation is present.   Normal appearingmitral valve structure.   Mild to Moderate mitral regurgitation is present.   Normal appearing tricuspid valve structure.   Mild to Moderate Tricuspid regurgitation is present.   Mild pulmonary hypertension.   Mild pulmonic valvular regurgitation (1+) is present.   Pulmonic valve not well seen.        =========================================================  Ajay Foy M.D.  Cardiology, [facility]  Cell: [phone]  Offices:   [phone] ([facility] Office)  [phone] ([facility] Office)

## 2021-09-14 NOTE — PROGRESS NOTE ADULT - PROBLEM SELECTOR PLAN 1
non specific EKG changes due to underlying hypertensive heart disease , no significant change compared to prior ekg    normal LVEF    Stress test today w/ no ischemia  - of note also had stress 12/'20 which was also neg for ischemia.  Reviewed results w/ pt.  Advise cardiology followup in 1 month after discharge w/ Dr. Palla.    Will sign off please call if questions.

## 2021-09-15 DIAGNOSIS — R00.2 PALPITATIONS: ICD-10-CM

## 2021-09-15 PROCEDURE — 99231 SBSQ HOSP IP/OBS SF/LOW 25: CPT

## 2021-09-15 PROCEDURE — 93010 ELECTROCARDIOGRAM REPORT: CPT

## 2021-09-15 PROCEDURE — 99232 SBSQ HOSP IP/OBS MODERATE 35: CPT

## 2021-09-15 RX ADMIN — BACITRACIN AND POLYMYXIN B SULFATE 1 APPLICATION(S): 500; 10000 OINTMENT TOPICAL at 12:32

## 2021-09-15 RX ADMIN — LISINOPRIL 5 MILLIGRAM(S): 2.5 TABLET ORAL at 09:05

## 2021-09-15 RX ADMIN — BACITRACIN AND POLYMYXIN B SULFATE 1 APPLICATION(S): 500; 10000 OINTMENT TOPICAL at 19:47

## 2021-09-15 RX ADMIN — Medication 650 MILLIGRAM(S): at 12:32

## 2021-09-15 RX ADMIN — Medication 5 MILLIGRAM(S): at 19:47

## 2021-09-15 RX ADMIN — Medication 81 MILLIGRAM(S): at 09:06

## 2021-09-15 RX ADMIN — Medication 1 PATCH: at 07:56

## 2021-09-15 RX ADMIN — LIDOCAINE 1 PATCH: 4 CREAM TOPICAL at 21:01

## 2021-09-15 RX ADMIN — Medication 600 MILLIGRAM(S): at 19:47

## 2021-09-15 RX ADMIN — Medication 600 MILLIGRAM(S): at 10:32

## 2021-09-15 RX ADMIN — Medication 2 MILLIGRAM(S): at 12:32

## 2021-09-15 RX ADMIN — Medication 2 MILLIGRAM(S): at 19:48

## 2021-09-15 RX ADMIN — LORATADINE 10 MILLIGRAM(S): 10 TABLET ORAL at 09:05

## 2021-09-15 RX ADMIN — BACITRACIN AND POLYMYXIN B SULFATE 1 APPLICATION(S): 500; 10000 OINTMENT TOPICAL at 09:07

## 2021-09-15 RX ADMIN — Medication 600 MILLIGRAM(S): at 09:06

## 2021-09-15 RX ADMIN — LIDOCAINE 1 PATCH: 4 CREAM TOPICAL at 09:06

## 2021-09-15 RX ADMIN — Medication 1 PATCH: at 09:06

## 2021-09-15 RX ADMIN — Medication 650 MILLIGRAM(S): at 11:00

## 2021-09-15 RX ADMIN — ATORVASTATIN CALCIUM 10 MILLIGRAM(S): 80 TABLET, FILM COATED ORAL at 19:47

## 2021-09-15 RX ADMIN — Medication 2 MILLIGRAM(S): at 09:06

## 2021-09-15 RX ADMIN — Medication 1 PATCH: at 21:01

## 2021-09-15 RX ADMIN — QUETIAPINE FUMARATE 50 MILLIGRAM(S): 200 TABLET, FILM COATED ORAL at 19:48

## 2021-09-15 RX ADMIN — Medication 1 PATCH: at 09:07

## 2021-09-15 NOTE — PROGRESS NOTE BEHAVIORAL HEALTH - NSBHFUPINTERVALHXFT_PSY_A_CORE
Pt seen several times in her room alone and with other staff. . The pt had earlier appeared calm and in good spirits, However, when hospital staff spoke with the pt as to the importance of her allowing hospital staff to treat the pt's psychosis with antipsychotic medication, and when alternative  disposition planning options were then discussed, the pt then began expressing more somatic complaints first related to musculoskeletal, then to GI and to cardiac concerns despite the pt's having had a very comprehensive cardiology work up with largely normal stress test and perfusion scan not indicating acute ischemia.    Pt continues to appear somewhat disheveled with poor attention to grooming and to her ADLs.  " The pt has  now been  compliant with her Lisinopril and her Atorvastatin for treatment of her HTN and hyperlipidemia, respectively, though the pt continues to refuse any and all antipsychotic medications recommended to her in an effort to alleviate the pt's ongoing and seemingly entrenched affective psychosis. The pt continues to attend therapy groups and offers support to other group members while remaining uncharacteristically silent as to her own trials and tribulations.     The pt's speech continues near pressured and remains frequently  difficult to interrupt. The pt 's thinking appeared quite illogical with ongoing paranoid, grandiose and persecutory delusional thinking. The pt currently denied SI /HI but did endorse recent AH and VH as above. The pt's judgment appears quite impaired with little insight into the nature and severity of her presumptive schizoaffective disorder and reported h/o ETOH use d/o and CBD  . As above, the  pt remains vehemently opposed to any trials of any antipsychotic or mood stabilizing medications and appeared to express pity towards those medical professionals who " always look to these drugs to help when all I need is my medical marijuana and I'm fine."

## 2021-09-15 NOTE — PROGRESS NOTE BEHAVIORAL HEALTH - NSBHFUPINTERVALCCFT_PSY_A_CORE
" I want a second opinion. I saw the ECHO scan and they said I had a pulmonary embolus and I need to go to Medicine and you want me to die."    All of the above pt comments are not factually true but no amount of reassurance by this writer and by RN and other staff was sufficient to help the pt to believe that her safety is and has always been paramount during this and all of her prior hospital admissions .   The pt with c/o c/p . EKG STAT with no changes from recent EKG and Cardiology consult service follow up who had just signed off a day earlier with recommendation for pt follow up with outpatient Cardiology after pt DC from hospital when clinically stable.

## 2021-09-15 NOTE — PROGRESS NOTE ADULT - PROBLEM SELECTOR PLAN 3
controlled ,  continue low salt diet and medication

## 2021-09-15 NOTE — PROGRESS NOTE ADULT - PROBLEM SELECTOR PLAN 1
non specific EKG changes due to underlying hypertensive heart disease , no significant change compared to prior ekg    normal LVEF no significant abnormalities.   Stress test today w/ no ischemia  - of note also had stress 12/'20 which was also neg for ischemia.  Pt has no new symptoms concerning for cardiac etiology since last evaluation.    As noted previously, advise cardiology followup in 1 month after discharge w/ Dr. Palla.    Will sign off please call if questions. non specific EKG changes due to underlying hypertensive heart disease , no significant change compared to prior ekg    normal LVEF no significant abnormalities.   Stress test today w/ no ischemia  - of note also had stress 12/'20 which was also neg for ischemia.    Pt reports chest pain but is atypical, does not sound angina.  ECG reviewed during chest pain no ischemic changes.  Per psychiatry pt is refusing meds & is likely paranoid may be malingering.  No further workup for chest pain.    Pt reports palpitations, cannot exclude arrhythmias.  MCOT or ziopatch ordered (whichever is covered by insurance)  Psychiatry can call office tommorrow to place on pt prior to discharge.  followup w/ Dr. Palla in 2 weeks in clinic.

## 2021-09-15 NOTE — PROGRESS NOTE ADULT - PROBLEM SELECTOR PLAN 2
Patient with prior multiple stents  atypical chest pain few days ago was evaluated in er  , patient did have cardiac  cath in 2019 showed patent stents , cont. ASA, statin.
Patient with prior multiple stents  atypical chest pain few days ago was evaluated in er  , patient did have cardiac  cath in 2019 showed patent stents , patient is not compliance to stating.
Patient with prior multiple stents  atypical chest pain few days ago was evaluated in er  , patient did have cardiac  cath in 2019 showed patent stents , cont. ASA, statin.
Patient with prior multiple stents  atypical chest pain few days ago was evaluated in er  , patient did have cardiac  cath in 2019 showed patent stents , stress neg as noted above, cont. ASA, statin.

## 2021-09-15 NOTE — PROGRESS NOTE ADULT - PROBLEM SELECTOR PLAN 4
cont. statin, FLP in 1 month as op.
cont. statin, FLP in 1 month as op.
patient was suggested to take statin ,she started taking statin , follow up blood work , encourage patient to quit smoking.
cont. statin, FLP in 1 month as op.

## 2021-09-15 NOTE — PROGRESS NOTE ADULT - SUBJECTIVE AND OBJECTIVE BOX
CHIEF COMPLAINT:  police sent me to here          HPI:  59yr old female PMH HTN, HLD, CAD, stents, anxiety, bipolar, chronic back pain, herniated disc  admitted to psych unit with bipolar disorder , called for cardiology consult as patient was noted to have abnormal ekg     patient says she did have episode of chest pain few days ago , she was evaluated  at   [facility] , and  [facility] in [facility] , was sent home       Pt. reports that pain is similar to prior episode of CP in 2019 which required stents.     Pt states stressors involving multiple relocations over the past few months due to homelessness .    Patient denies any  chest pain after coming here     9/13/'21: seen in stress lab no complaints overnight.  9/14/'21: discussed results of cardiac testing.  9/15/'21: reconsulted to evaluate pt as she insists on   evaluation by cardiologist given her chest pain.  ask pt about chest pain.  Atypical/noncardiac, similar to last interview.  Reports she was told she had a "pulmonary embolism" based on echo   report, I reassured her this was not the case.          REQUESTING PHYSICIAN:    REASON FOR CONSULT:    HPI:      MEDICATIONS:  OUTPATIENT  Home Medications:      INPATIENT  MEDICATIONS  (STANDING):  aspirin enteric coated 81 milliGRAM(s) Oral daily  atorvastatin 10 milliGRAM(s) Oral at bedtime  bacitracin/polymyxin B Ointment 1 Application(s) Topical three times a day  lidocaine   4% Patch 1 Patch Transdermal daily  lisinopril 5 milliGRAM(s) Oral daily  loratadine 10 milliGRAM(s) Oral daily  melatonin 5 milliGRAM(s) Oral at bedtime  nicotine - 21 mG/24Hr(s) Patch 1 patch Transdermal daily  QUEtiapine 50 milliGRAM(s) Oral at bedtime    MEDICATIONS  (PRN):  acetaminophen   Tablet .. 650 milliGRAM(s) Oral every 6 hours PRN Temp greater or equal to 38C (100.4F), Mild Pain (1 - 3), Moderate Pain (4 - 6)  aluminum hydroxide/magnesium hydroxide/simethicone Suspension 30 milliLiter(s) Oral every 6 hours PRN Dyspepsia  diphenhydrAMINE   Injectable 50 milliGRAM(s) IntraMuscular every 6 hours PRN Agitation  haloperidol    Injectable 5 milliGRAM(s) IntraMuscular every 6 hours PRN Agitation  ibuprofen  Tablet. 600 milliGRAM(s) Oral every 8 hours PRN Temp greater or equal to 38C (100.4F), Mild Pain (1 - 3), Moderate Pain (4 - 6)  LORazepam     Tablet 1 milliGRAM(s) Oral every 1 hour PRN CIWA-Ar score 8 or greater  LORazepam   Injectable 2 milliGRAM(s) IntraMuscular every 4 hours PRN Agitation  magnesium hydroxide Suspension 30 milliLiter(s) Oral daily PRN Constipation  nicotine  Polacrilex Gum 2 milliGRAM(s) Oral every 2 hours PRN nicotine craving          Vital Signs Last 24 Hrs  T(C): 36.9 (15 Sep 2021 07:11), Max: 36.9 (15 Sep 2021 07:11)  T(F): 98.4 (15 Sep 2021 07:11), Max: 98.4 (15 Sep 2021 07:11)  HR: --  BP: --  BP(mean): --  RR: 14 (15 Sep 2021 07:11) (14 - 14)  SpO2: 98% (15 Sep 2021 07:11) (98% - 98%)Daily     Daily I&O's Summary      IPHYSICAL EXAM:    Constitutional: NAD, awake and alert, well-developed  HEENT: PERR, EOMI,  No oral cyanosis.  Neck:  supple,  No JVD  Respiratory: Breath sounds are clear bilaterally, No wheezing, rales or rhonchi  Cardiovascular: S1 and S2, regular rate and rhythm, no Murmurs, gallops or rubs  Gastrointestinal: Bowel Sounds present, soft, nontender.   Extremities: No peripheral edema. No clubbing or cyanosis.  Vascular: 2+ peripheral pulses  Neurological: A/O x 3, no focal deficits  Musculoskeletal: no calf tenderness.  Skin: No rashes.      LABS: All Labs Reviewed:                Blood Culture:       ===============================  CARDIAC MARKERS ( 09 Sep 2021 20:44 )  <0.015 ng/mL / x     / x     / x     / x      CARDIAC MARKERS ( 09 Sep 2021 17:07 )  <0.015 ng/mL / x     / x     / x     / x          LIVER FUNCTIONS - ( 10 Sep 2021 09:25 )  Alb: 3.9 g/dL / Pro: 7.3 gm/dL / ALK PHOS: 75 U/L / ALT: 27 U/L / AST: 15 U/L / GGT: x             < from: 12 Lead ECG (09.09.21 @ 16:25) >  Sinus bradycardia  Septal infarct , age undetermined  ST & T wave abnormality, consider inferior ischemia  Abnormal ECG  When compared with ECG of 09-SEP-2021 14:21,  Septal infarct is now Present  Nonspecific T wave abnormality, worse in Inferior leads  Confirmed by NURIS RENAE PAUL ([id number]) on 9/10/2021 9:00:11 AM    < end of copied text >  < from: Cardiac Cath Lab - Adult (09.09.19 @ 10:31) >  VENTRICLES:No left ventriculogram was performed.  CORONARY VESSELS: The coronary circulation is right dominant.  LM:   --  LM: Angiography showed mild atherosclerosis with no flow limiting  lesions.  LAD:   --  Proximal LAD: There was a discrete 40 % stenosis at a site with  no prior intervention. The lesion was eccentric. There was MAGGIE grade 3  flow through the vessel (brisk flow).  --  Mid LAD: There was a tubular 10 % stenosis at the site of a prior  stent. The lesion was smoothly contoured. There was MAGGIE grade 3 flow  through the vessel (brisk flow).  --  Distal LAD: The catheter induced distal vessel spasm which resolved  with nitroglycerin.  --  D1: There was a tubular 30 % stenosis at a site with no prior  intervention. There was MAGGIE grade 3 flow through the vessel (brisk flow).  CX:   --  Circumflex: Angiography showed mild atherosclerosis with no flow  limiting lesions.  --  OM1: Angiography showed mild atherosclerosis with no flow limiting  lesions.  RCA:   --  Mid RCA: There was a tubular 30 % stenosis at the site of a  prior stent. The lesion was eccentric. There was MAGGIE grade 3 flow through  the vessel (brisk flow).  --  RPDA: Angiography showed mild atherosclerosis with no flow limiting  lesions.  --  RPLS: Angiography showed mild atherosclerosis with no flow limiting  lesions.  COMPLICATIONS: There were no complications.  DIAGNOSTIC IMPRESSIONS: Patent stents LAD and RCA  mild-moderate spasm of mid and distal RCA around the old stent, this  improved with NTG  DIAGNOSTIC RECOMMENDATIONS: Medical therapy  INTERVENTIONAL IMPRESSIONS: Patent stents LAD and RCA  mild-moderate spasm of mid and distal RCA around the old stent, this  improved with NTG  INTERVENTIONAL RECOMMENDATIONS: Medical therapy    < end of copied text >  < end of copied text >    ==============================    < from: NM Nuclear Stress Pharmacologic Multiple (09.13.21 @ 11:24) >  IMPRESSION: Normal SPECT Myocardial Perfusion Imaging.  No scan evidence of reversible or fixed perfusion defects.  Normal left ventricular contractility with an ejection fraction of 68% (Normal: 50% or greater).  No regional wall motion abnormalities.  Findings are not significantly changed compared to the previous study of 12/30/2020.  Please refer to cardiac stress test report for dosage of Regadenoson administered, EKG findings and symptoms during the procedure.    --- End of Report ---  ARLENE CRYSTAL MD; Attending Nuclear Medicine  This document has been electronically signed. Sep 13 2021 11:44AM    < end of copied text >    < from: NM Nuclear Stress Pharmacologic Multiple (12.30.20 @ 10:36) >  IMPRESSION: Normal SPECT Myocardial Perfusion Imaging.  No scan evidence of reversible or fixed perfusion defects.  Normal left ventricular contractility with an ejection fraction of 67% (Normal: 50% or greater).  No regional wall motion abnormalities.  Please refer to cardiac stress test report for Regadenoson dose administered, EKG findings and symptoms during the procedure.      VIOLET BOB MD; Attending Nuclear Medicine  This document has been electronically signed. Dec 30 2020  3:45PM      < from: TTE Echo Complete w/o Contrast w/ Doppler (09.11.21 @ 13:40) >     Estimated left ventricular ejection fraction is 60-65 %.   The left ventricle is normal in size, wall thickness, wall motion and   contractility as seen in limited views.   The left atrium is mildly dilated.   Fibrocalcific changes noted to the Aortic valve leaflets with preserved   leaflet excursion.   Mild (1+) aortic regurgitation is present.   Normal appearingmitral valve structure.   Mild to Moderate mitral regurgitation is present.   Normal appearing tricuspid valve structure.   Mild to Moderate Tricuspid regurgitation is present.   Mild pulmonary hypertension.   Mild pulmonic valvular regurgitation (1+) is present.   Pulmonic valve not well seen.        =========================================================  Ajay Foy M.D.  Cardiology, [facility]  Cell:   Offices:    ([facility] Office)  [phone] ([facility] Office)              CHIEF COMPLAINT:  police sent me to here          HPI:  59yr old female PMH HTN, HLD, CAD, stents, anxiety, bipolar, chronic back pain, herniated disc  admitted to psych unit with bipolar disorder , called for cardiology consult as patient was noted to have abnormal ekg     patient says she did have episode of chest pain few days ago , she was evaluated  at   [facility] , and  [facility] in [facility] , was sent home       Pt. reports that pain is similar to prior episode of CP in 2019 which required stents.     Pt states stressors involving multiple relocations over the past few months due to homelessness .    Patient denies any  chest pain after coming here     9/13/'21: seen in stress lab no complaints overnight.  9/14/'21: discussed results of cardiac testing.  9/15/'21: reconsulted to evaluate pt as she insists on   evaluation by cardiologist given her chest pain.  ask pt about chest pain.  Atypical/noncardiac, similar to last interview.  Reports she was told she had a "pulmonary embolism" based on echo   report, I reassured her this was not the case.  Also reports intermittent palpitations occuring every other day.  no syncope or lightheadness.  poor historian          REQUESTING PHYSICIAN:    REASON FOR CONSULT:    HPI:      MEDICATIONS:  OUTPATIENT  Home Medications:      INPATIENT  MEDICATIONS  (STANDING):  aspirin enteric coated 81 milliGRAM(s) Oral daily  atorvastatin 10 milliGRAM(s) Oral at bedtime  bacitracin/polymyxin B Ointment 1 Application(s) Topical three times a day  lidocaine   4% Patch 1 Patch Transdermal daily  lisinopril 5 milliGRAM(s) Oral daily  loratadine 10 milliGRAM(s) Oral daily  melatonin 5 milliGRAM(s) Oral at bedtime  nicotine - 21 mG/24Hr(s) Patch 1 patch Transdermal daily  QUEtiapine 50 milliGRAM(s) Oral at bedtime    MEDICATIONS  (PRN):  acetaminophen   Tablet .. 650 milliGRAM(s) Oral every 6 hours PRN Temp greater or equal to 38C (100.4F), Mild Pain (1 - 3), Moderate Pain (4 - 6)  aluminum hydroxide/magnesium hydroxide/simethicone Suspension 30 milliLiter(s) Oral every 6 hours PRN Dyspepsia  diphenhydrAMINE   Injectable 50 milliGRAM(s) IntraMuscular every 6 hours PRN Agitation  haloperidol    Injectable 5 milliGRAM(s) IntraMuscular every 6 hours PRN Agitation  ibuprofen  Tablet. 600 milliGRAM(s) Oral every 8 hours PRN Temp greater or equal to 38C (100.4F), Mild Pain (1 - 3), Moderate Pain (4 - 6)  LORazepam     Tablet 1 milliGRAM(s) Oral every 1 hour PRN CIWA-Ar score 8 or greater  LORazepam   Injectable 2 milliGRAM(s) IntraMuscular every 4 hours PRN Agitation  magnesium hydroxide Suspension 30 milliLiter(s) Oral daily PRN Constipation  nicotine  Polacrilex Gum 2 milliGRAM(s) Oral every 2 hours PRN nicotine craving          Vital Signs Last 24 Hrs  T(C): 36.9 (15 Sep 2021 07:11), Max: 36.9 (15 Sep 2021 07:11)  T(F): 98.4 (15 Sep 2021 07:11), Max: 98.4 (15 Sep 2021 07:11)  HR: --  BP: --  BP(mean): --  RR: 14 (15 Sep 2021 07:11) (14 - 14)  SpO2: 98% (15 Sep 2021 07:11) (98% - 98%)Daily     Daily I&O's Summary      IPHYSICAL EXAM:    Constitutional: NAD, awake and alert, well-developed  HEENT: PERR, EOMI,  No oral cyanosis.  Neck:  supple,  No JVD  Respiratory: Breath sounds are clear bilaterally, No wheezing, rales or rhonchi  Cardiovascular: S1 and S2, regular rate and rhythm, no Murmurs, gallops or rubs  Gastrointestinal: Bowel Sounds present, soft, nontender.   Extremities: No peripheral edema. No clubbing or cyanosis.  Vascular: 2+ peripheral pulses  Neurological: A/O x 3, no focal deficits  Musculoskeletal: no calf tenderness.  Skin: No rashes.      LABS: All Labs Reviewed:                Blood Culture:       ===============================  CARDIAC MARKERS ( 09 Sep 2021 20:44 )  <0.015 ng/mL / x     / x     / x     / x      CARDIAC MARKERS ( 09 Sep 2021 17:07 )  <0.015 ng/mL / x     / x     / x     / x          LIVER FUNCTIONS - ( 10 Sep 2021 09:25 )  Alb: 3.9 g/dL / Pro: 7.3 gm/dL / ALK PHOS: 75 U/L / ALT: 27 U/L / AST: 15 U/L / GGT: x             < from: 12 Lead ECG (09.09.21 @ 16:25) >  Sinus bradycardia  Septal infarct , age undetermined  ST & T wave abnormality, consider inferior ischemia  Abnormal ECG  When compared with ECG of 09-SEP-2021 14:21,  Septal infarct is now Present  Nonspecific T wave abnormality, worse in Inferior leads  Confirmed by NURIS RENAE PAUL ([id number]) on 9/10/2021 9:00:11 AM    < end of copied text >  < from: Cardiac Cath Lab - Adult (09.09.19 @ 10:31) >  VENTRICLES:No left ventriculogram was performed.  CORONARY VESSELS: The coronary circulation is right dominant.  LM:   --  LM: Angiography showed mild atherosclerosis with no flow limiting  lesions.  LAD:   --  Proximal LAD: There was a discrete 40 % stenosis at a site with  no prior intervention. The lesion was eccentric. There was MAGGIE grade 3  flow through the vessel (brisk flow).  --  Mid LAD: There was a tubular 10 % stenosis at the site of a prior  stent. The lesion was smoothly contoured. There was MAGGIE grade 3 flow  through the vessel (brisk flow).  --  Distal LAD: The catheter induced distal vessel spasm which resolved  with nitroglycerin.  --  D1: There was a tubular 30 % stenosis at a site with no prior  intervention. There was MAGGIE grade 3 flow through the vessel (brisk flow).  CX:   --  Circumflex: Angiography showed mild atherosclerosis with no flow  limiting lesions.  --  OM1: Angiography showed mild atherosclerosis with no flow limiting  lesions.  RCA:   --  Mid RCA: There was a tubular 30 % stenosis at the site of a  prior stent. The lesion was eccentric. There was MAGGIE grade 3 flow through  the vessel (brisk flow).  --  RPDA: Angiography showed mild atherosclerosis with no flow limiting  lesions.  --  RPLS: Angiography showed mild atherosclerosis with no flow limiting  lesions.  COMPLICATIONS: There were no complications.  DIAGNOSTIC IMPRESSIONS: Patent stents LAD and RCA  mild-moderate spasm of mid and distal RCA around the old stent, this  improved with NTG  DIAGNOSTIC RECOMMENDATIONS: Medical therapy  INTERVENTIONAL IMPRESSIONS: Patent stents LAD and RCA  mild-moderate spasm of mid and distal RCA around the old stent, this  improved with NTG  INTERVENTIONAL RECOMMENDATIONS: Medical therapy    < end of copied text >  < end of copied text >    ==============================    < from: NM Nuclear Stress Pharmacologic Multiple (09.13.21 @ 11:24) >  IMPRESSION: Normal SPECT Myocardial Perfusion Imaging.  No scan evidence of reversible or fixed perfusion defects.  Normal left ventricular contractility with an ejection fraction of 68% (Normal: 50% or greater).  No regional wall motion abnormalities.  Findings are not significantly changed compared to the previous study of 12/30/2020.  Please refer to cardiac stress test report for dosage of Regadenoson administered, EKG findings and symptoms during the procedure.    --- End of Report ---  ARLENE CRYSTAL MD; Attending Nuclear Medicine  This document has been electronically signed. Sep 13 2021 11:44AM    < end of copied text >    < from: NM Nuclear Stress Pharmacologic Multiple (12.30.20 @ 10:36) >  IMPRESSION: Normal SPECT Myocardial Perfusion Imaging.  No scan evidence of reversible or fixed perfusion defects.  Normal left ventricular contractility with an ejection fraction of 67% (Normal: 50% or greater).  No regional wall motion abnormalities.  Please refer to cardiac stress test report for Regadenoson dose administered, EKG findings and symptoms during the procedure.      VIOLET BOB MD; Attending Nuclear Medicine  This document has been electronically signed. Dec 30 2020  3:45PM      < from: TTE Echo Complete w/o Contrast w/ Doppler (09.11.21 @ 13:40) >     Estimated left ventricular ejection fraction is 60-65 %.   The left ventricle is normal in size, wall thickness, wall motion and   contractility as seen in limited views.   The left atrium is mildly dilated.   Fibrocalcific changes noted to the Aortic valve leaflets with preserved   leaflet excursion.   Mild (1+) aortic regurgitation is present.   Normal appearingmitral valve structure.   Mild to Moderate mitral regurgitation is present.   Normal appearing tricuspid valve structure.   Mild to Moderate Tricuspid regurgitation is present.   Mild pulmonary hypertension.   Mild pulmonic valvular regurgitation (1+) is present.   Pulmonic valve not well seen.        =========================================================  Ajay Foy M.D.  Cardiology, [facility]  Cell: [phone]  Offices:    ([facility] Office)  [phone] ([facility] Office)

## 2021-09-15 NOTE — PROGRESS NOTE BEHAVIORAL HEALTH - OTHER
intense, glaring " I know I saw a pulmonary embolus on the x ray. I heard them talking about it." the pt remains intrusive with poor boundaries, more openly suspicious variable paranoid, grandiose, somatic, Bahai, persecutory delusional thinking

## 2021-09-16 LAB — SARS-COV-2 RNA SPEC QL NAA+PROBE: SIGNIFICANT CHANGE UP

## 2021-09-16 PROCEDURE — 99231 SBSQ HOSP IP/OBS SF/LOW 25: CPT

## 2021-09-16 RX ORDER — LORATADINE 10 MG/1
1 TABLET ORAL
Qty: 14 | Refills: 0
Start: 2021-09-16 | End: 2021-09-29

## 2021-09-16 RX ORDER — LISINOPRIL 2.5 MG/1
1 TABLET ORAL
Qty: 14 | Refills: 0
Start: 2021-09-16 | End: 2021-09-29

## 2021-09-16 RX ORDER — ATORVASTATIN CALCIUM 80 MG/1
1 TABLET, FILM COATED ORAL
Qty: 14 | Refills: 0
Start: 2021-09-16 | End: 2021-09-29

## 2021-09-16 RX ORDER — ASPIRIN/CALCIUM CARB/MAGNESIUM 324 MG
1 TABLET ORAL
Qty: 14 | Refills: 0
Start: 2021-09-16 | End: 2021-09-29

## 2021-09-16 RX ADMIN — QUETIAPINE FUMARATE 50 MILLIGRAM(S): 200 TABLET, FILM COATED ORAL at 20:37

## 2021-09-16 RX ADMIN — Medication 2 MILLIGRAM(S): at 13:15

## 2021-09-16 RX ADMIN — ATORVASTATIN CALCIUM 10 MILLIGRAM(S): 80 TABLET, FILM COATED ORAL at 20:36

## 2021-09-16 RX ADMIN — LORATADINE 10 MILLIGRAM(S): 10 TABLET ORAL at 09:12

## 2021-09-16 RX ADMIN — Medication 5 MILLIGRAM(S): at 20:37

## 2021-09-16 RX ADMIN — Medication 2 MILLIGRAM(S): at 09:47

## 2021-09-16 RX ADMIN — Medication 2 MILLIGRAM(S): at 19:48

## 2021-09-16 RX ADMIN — BACITRACIN AND POLYMYXIN B SULFATE 1 APPLICATION(S): 500; 10000 OINTMENT TOPICAL at 09:13

## 2021-09-16 RX ADMIN — LISINOPRIL 5 MILLIGRAM(S): 2.5 TABLET ORAL at 09:12

## 2021-09-16 RX ADMIN — Medication 81 MILLIGRAM(S): at 09:12

## 2021-09-16 RX ADMIN — LIDOCAINE 1 PATCH: 4 CREAM TOPICAL at 09:13

## 2021-09-16 RX ADMIN — Medication 1 PATCH: at 09:11

## 2021-09-16 NOTE — PROGRESS NOTE BEHAVIORAL HEALTH - OTHER
" I know I saw a pulmonary embolus on the x ray. I heard them talking about it." the pt remains intrusive with poor boundaries, more openly suspicious variable paranoid, grandiose, somatic, Taoist, persecutory delusional thinking intense, glaring

## 2021-09-16 NOTE — PROGRESS NOTE BEHAVIORAL HEALTH - NSBHFUPINTERVALCCFT_PSY_A_CORE
" I'll follow up with treatment."     The pt with c/o c/p . EKG STAT with no changes from recent EKG and Cardiology consult service follow up who had just signed off a day earlier with recommendation for pt follow up with outpatient Cardiology after pt DC from hospital when clinically stable.   Writer reconsulted Cardiology Dr. Foy on 9/15/2021  for follow up . Exam WNL. Atypical angina. Pt h/o palpitations. Plan for pt follow up with Dr. Palla in 2 weeks after pt DC from hospital for possible Holter monitoring.

## 2021-09-16 NOTE — PROGRESS NOTE BEHAVIORAL HEALTH - PROBLEM SELECTOR PLAN 1
1. 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  5.Pt to have follow up appointments at [facility] and with Cardiology as above.   6.Safety planning reviewed.

## 2021-09-16 NOTE — PROGRESS NOTE BEHAVIORAL HEALTH - NSBHFUPINTERVALHXFT_PSY_A_CORE
Pt seen . Once again calm and cooperative though still med refusing related to recommended antipsychotic medications in an effort  to alleviate the pt's longstanding, chronic affective psychosis.  The pt appeared more rested and is eating well.    Pt appeared with better attention to her ADLs and to her grooming.  The pt has  now remained largely   compliant with her Lisinopril and her Atorvastatin for treatment of her HTN and hyperlipidemia, respectively, though the pt continues to refuse any and all antipsychotic medications recommended to her in an effort to alleviate the pt's ongoing and seemingly entrenched affective psychosis. The pt continues to attend therapy groups and offers support to other group members while remaining uncharacteristically silent as to her own trials and tribulations.  Though the pt remains with symptoms of her chronic affective psychosis, she has, nonetheless, been able to maintain a marginal  safe existence in the community, with the pt's ongoing psychotropic medication  at this time her right and her preference do to no current acute safety concerns.     The pt currently denied SI /HI but had endorsed intermittent non command type  AH and VH .The pt's judgment remains  impaired with little insight into the nature and severity of her presumptive schizoaffective disorder and reported h/o ETOH use d/o and CBD  . As above, the  pt remains vehemently opposed to any trials of any antipsychotic or mood stabilizing medications .    The pt was again encouraged to follow up with her outpatient psychiatric and cardiology appointments.  The pt was also encouraged to DC ETOH Pt seen . Once again calm and cooperative though still med refusing related to recommended antipsychotic medications in an effort  to alleviate the pt's longstanding, chronic affective psychosis.  The pt appeared more rested and is eating well.    Pt appeared with better attention to her ADLs and to her grooming.  The pt has  now remained largely   compliant with her Lisinopril and her Atorvastatin for treatment of her HTN and hyperlipidemia, respectively, though the pt continues to refuse any and all antipsychotic medications recommended to her in an effort to alleviate the pt's ongoing and seemingly entrenched affective psychosis. The pt continues to attend therapy groups and offers support to other group members while remaining uncharacteristically silent as to her own trials and tribulations.  Though the pt remains with symptoms of her chronic affective psychosis, she has, nonetheless, been able to maintain a marginal  safe existence in the community, with the pt's ongoing psychotropic medication  at this time her right and her preference do to no current acute safety concerns.     The pt currently denied SI /HI but had endorsed intermittent non command type  AH and VH .The pt's judgment remains  impaired with little insight into the nature and severity of her presumptive schizoaffective disorder and reported h/o ETOH use d/o and CBD  . As above, the  pt remains vehemently opposed to any trials of any antipsychotic or mood stabilizing medications .    The pt was again encouraged to follow up with her outpatient psychiatric and cardiology appointments.  The pt was also encouraged to DC ETOH and to cut back on her overall use of medical THC in place of recommended antipsychotic medications.

## 2021-09-17 PROCEDURE — 99232 SBSQ HOSP IP/OBS MODERATE 35: CPT

## 2021-09-17 RX ORDER — LANOLIN ALCOHOL/MO/W.PET/CERES
10 CREAM (GRAM) TOPICAL AT BEDTIME
Refills: 0 | Status: DISCONTINUED | OUTPATIENT
Start: 2021-09-17 | End: 2021-09-20

## 2021-09-17 RX ADMIN — BACITRACIN AND POLYMYXIN B SULFATE 1 APPLICATION(S): 500; 10000 OINTMENT TOPICAL at 13:04

## 2021-09-17 RX ADMIN — LIDOCAINE 1 PATCH: 4 CREAM TOPICAL at 18:45

## 2021-09-17 RX ADMIN — BACITRACIN AND POLYMYXIN B SULFATE 1 APPLICATION(S): 500; 10000 OINTMENT TOPICAL at 09:28

## 2021-09-17 RX ADMIN — LIDOCAINE 1 PATCH: 4 CREAM TOPICAL at 21:32

## 2021-09-17 RX ADMIN — Medication 81 MILLIGRAM(S): at 09:28

## 2021-09-17 RX ADMIN — Medication 600 MILLIGRAM(S): at 10:45

## 2021-09-17 RX ADMIN — QUETIAPINE FUMARATE 50 MILLIGRAM(S): 200 TABLET, FILM COATED ORAL at 20:22

## 2021-09-17 RX ADMIN — Medication 1 PATCH: at 09:28

## 2021-09-17 RX ADMIN — Medication 600 MILLIGRAM(S): at 19:48

## 2021-09-17 RX ADMIN — ATORVASTATIN CALCIUM 10 MILLIGRAM(S): 80 TABLET, FILM COATED ORAL at 20:22

## 2021-09-17 RX ADMIN — Medication 2 MILLIGRAM(S): at 10:46

## 2021-09-17 RX ADMIN — Medication 2 MILLIGRAM(S): at 18:46

## 2021-09-17 RX ADMIN — LIDOCAINE 1 PATCH: 4 CREAM TOPICAL at 09:30

## 2021-09-17 RX ADMIN — Medication 1 PATCH: at 21:32

## 2021-09-17 RX ADMIN — BACITRACIN AND POLYMYXIN B SULFATE 1 APPLICATION(S): 500; 10000 OINTMENT TOPICAL at 20:23

## 2021-09-17 RX ADMIN — Medication 2 MILLIGRAM(S): at 20:25

## 2021-09-17 RX ADMIN — Medication 2 MILLIGRAM(S): at 15:33

## 2021-09-17 RX ADMIN — Medication 600 MILLIGRAM(S): at 18:48

## 2021-09-17 RX ADMIN — Medication 2 MILLIGRAM(S): at 04:47

## 2021-09-17 RX ADMIN — LISINOPRIL 5 MILLIGRAM(S): 2.5 TABLET ORAL at 09:28

## 2021-09-17 RX ADMIN — Medication 2 MILLIGRAM(S): at 08:24

## 2021-09-17 RX ADMIN — Medication 2 MILLIGRAM(S): at 13:05

## 2021-09-17 RX ADMIN — LORATADINE 10 MILLIGRAM(S): 10 TABLET ORAL at 09:28

## 2021-09-17 RX ADMIN — Medication 600 MILLIGRAM(S): at 11:44

## 2021-09-17 RX ADMIN — Medication 1 PATCH: at 07:39

## 2021-09-17 RX ADMIN — Medication 10 MILLIGRAM(S): at 20:22

## 2021-09-17 RX ADMIN — Medication 1 PATCH: at 09:35

## 2021-09-17 NOTE — PROGRESS NOTE BEHAVIORAL HEALTH - OTHER
good. variable ongoing but slowly less intense paranoid, grandiose, somatic, Christianity, delusional thinking

## 2021-09-17 NOTE — DISCHARGE NOTE BEHAVIORAL HEALTH - REASON FOR ADMISSION
Per pt's ED BH Assessment completed on 9/9/21 by Dr. Erasmo Almaraz: "I hear devil, but I am psychic " Per pt's ED BH Assessment completed on 9/9/21 by Dr. Erasmo Almaraz: "I hear devil, but I am a psychic "

## 2021-09-17 NOTE — DISCHARGE NOTE BEHAVIORAL HEALTH - NSBHDCRESOURCESOTHERFT_PSY_A_CORE
[facility]- for psychiatric crises (available AFTER HOURS)  24/7 hotline: [phone]  Address:  [address], [place] [address]    [phone]-[facility] ([phone]) or [email or web address]    [facility]: [phone]  *Walk In Crisis Center for psychiatric needs. Open 9am-5pm, M-F.  1-888-[place]-[facility] (1-714.957.3324)  National Suicide Prevention Lifeline 1-281.967.8683    SMART Recovery Groups  *Can meet online   [email or web address]     Find a local AA group:  [facility]  [email or web address]  [phone]

## 2021-09-17 NOTE — PROGRESS NOTE BEHAVIORAL HEALTH - NSBHFUPINTERVALCCFT_PSY_A_CORE
" My psoriasis is acting up again on my elbows and my face. I need cream for that. Now I have a 3 bedroom apartment with an [place] view in [place] I thought I was evicted from. But I wasn't."     The pt with c/o c/p . EKG STAT with no changes from recent EKG and Cardiology consult service follow up who had just signed off a day earlier with recommendation for pt follow up with outpatient Cardiology after pt DC from hospital when clinically stable.   Writer reconsulted Cardiology Dr. Foy on 9/15/2021  for follow up . Exam WNL. Atypical angina. Pt h/o palpitations. Plan for pt follow up with Dr. Palla in 2 weeks after pt DC from hospital for possible Holter monitoring.

## 2021-09-17 NOTE — DISCHARGE NOTE BEHAVIORAL HEALTH - NSBHDCCRISISPLAN1FT_PSY_A_CORE
Tell a trusted friend/family member, tell housing staff, tell your outpatient provider, call a crisis line ( Crisis Center ph. [phone], [facility] [phone], [facility] (peer support) ph. [phone]), return to the nearest emergency room. You may call 9-1-1 for assistance.

## 2021-09-17 NOTE — DISCHARGE NOTE BEHAVIORAL HEALTH - NS MD DC FALL RISK RISK
For information on Fall & injury Prevention, visit [email or web address]

## 2021-09-17 NOTE — DISCHARGE NOTE BEHAVIORAL HEALTH - HPI (INCLUDE ILLNESS QUALITY, SEVERITY, DURATION, TIMING, CONTEXT, MODIFYING FACTORS, ASSOCIATED SIGNS AND SYMPTOMS)
Per pt's ED BH Assessment completed on 9/9/21 by Dr. Erasmo Almaraz: "The pt  is a 60 year old single, unemployed,  female, non-caregiver, homeless,  h/o, alcohol abuse (1-3 bottles of wine daily, history of withdrawals but no seizures), history of benzodiazepine misuse (Xanax, Valium), history bipolar 2, several inpatient hospitalizations (most recently in [facility]; 1 month ago,  one prior suicide attempt by overdose in 2012, reported axis II - histrionic traits, noncompliant with treatment,  no hx of violence, no hx of arrests, BIB police for Ah and VH. Pt states "I am not schizophrenic, I am not depressed". Denies SI, HI, but reports hearing and seeing devil. She states "I am psychic".   Pt is requesting voluntary admission. denies travels, but she was not vaccinated and does not want to be vaccinated. Pt state she was exposed to a lot of people in public transportation.   FROM OUR OLD RECORDS :  "Relevant Past Psychiatric History Per pt's 5N hx, pt with a PPH of self-reported dx of bipolar disorder with delusions of grandeur and anxiety. Per chart, pt with hx of ETOH abuse, bipolar 2 disorder, axis II histrionic traits, with multiple inpatient admissions, most recently [facility] 3 years ago and [facility] psych unit years ago after SA via overdose. Per chart, last psych admission Sept. 2016.     Relevant Past Substance Use History Per 5N hx, pt endorsing active ETOH abuse (1-3 bottles of wine daily vs several glasses of wine with dinner), hx of withdrawals but no seizures, endorsed daily medical marijuana use, hx of benzo misuse (xanax and valium), remote hx of cocaine use, hx of inpatient treatment, Utox positive for THC.  alcohol abuse (1-3 bottles of wine daily, history of withdrawals but no seizures), history of benzodiazepine misuse (Xanax, Valium),  Cannabis use disorder daily medical marijuana use, "most of the day," however has run out of her prescription in past week.   , alcohol use disorder, stimulant use in remission  substance treatment in past  vaping less than one JUUL cartridge daily  Cannabis; Cocaine/Crack  daily medical marijuana.   Relevant Psychosocial/Family History Per 5N hx, no family hx known.  Collateral from Therapist Sarah [phone].   Cannabis use disorder, alcohol use disorder, stimulant use in remission, bipolar 2. Has been on doxepin and klonopin. 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 She went missing for several days and unreachable by providers. [place] from pt finally today from location other than her home which surprised therapist. Reportedly was trying to find her nieces in [place]. Reportedly "doctor friend" was giving her a free face lift. Believed people were trying to kill her near her home." Per pt's ED BH Assessment completed on 9/9/21 by Dr. Erasmo Almaraz: "The pt  is a 60 year old single, unemployed,  female, non-caregiver, homeless,  h/o, alcohol abuse (1-3 bottles of wine daily, history of withdrawals but no seizures), history of benzodiazepine misuse (Xanax, Valium), history bipolar 2, several inpatient hospitalizations (most recently in [facility]; 1 month ago,  one prior suicide attempt by overdose in 2012, reported axis II - histrionic traits, noncompliant with treatment,  no hx of violence, no hx of arrests, BIB police for Ah and VH. Pt states "I am not schizophrenic, I am not depressed". Denies SI, HI, but reports hearing and seeing devil. She states "I am psychic".   Pt is requesting voluntary admission. denies travels, but she was not vaccinated and does not want to be vaccinated. Pt state she was exposed to a lot of people in public transportation.   FROM OUR OLD RECORDS :  "Relevant Past Psychiatric History Per pt's 5N hx, pt with a PPH of self-reported dx of bipolar disorder with delusions of grandeur and anxiety. Per chart, pt with hx of ETOH abuse, bipolar 2 disorder, axis II histrionic traits, with multiple inpatient admissions, most recently [facility] 3 years ago and [facility] psych unit years ago after SA via overdose. Per chart, last psych admission Sept. 2016.     Relevant Past Substance Use History Per 5N hx, pt endorsing active ETOH abuse (1-3 bottles of wine daily vs several glasses of wine with dinner), hx of withdrawals but no seizures, endorsed daily medical marijuana use, hx of benzo misuse (xanax and valium), remote hx of cocaine use, hx of inpatient treatment, Utox positive for THC.  alcohol abuse (1-3 bottles of wine daily, history of withdrawals but no seizures), history of benzodiazepine misuse (Xanax, Valium),  Cannabis use disorder daily medical marijuana use, "most of the day," however has run out of her prescription in past week.   , alcohol use disorder, stimulant use in remission  substance treatment in past  vaping less than one JUUL cartridge daily  Cannabis; Cocaine/Crack  daily medical marijuana.   Relevant Psychosocial/Family History Per 5N hx, no family hx known.  Collateral from Therapist Sarah [phone].   Cannabis use disorder, alcohol use disorder, stimulant use in remission, bipolar 2. Has been on doxepin and Klonopin. 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 She went missing for several days and unreachable by providers. [place] from pt finally today from location other than her home which surprised therapist. Reportedly was trying to find her nieces in [place]. Reportedly "doctor friend" was giving her a free face lift. Believed people were trying to kill her near her home."    Interval Hx: Patient has been here for 10 days was admitted voluntarily and refused to take any Psychiatric meds for her bipolar D/O because she does not believe that she has Bipolar D/O and has been refusing meds since her admission. She was prescribed Seroquel 50 mg HS but stopped taking it and continues to do so. She endorsed that she smokes Medical Marijuana and feels OK as it helps her. She was advised to stay away from medical THC, but it's her personal choice and so we won't be able to address her personal beliefs. She endorses that she is not S/H and never tried to commit suicide, she denied other drug abuse, she has good sleep/appetite. She would be discharged today and only agreed to take medical meds which includes e.g. HTN, HLD, and Sleep meds  Melatonin 10 mg HS.

## 2021-09-17 NOTE — DISCHARGE NOTE BEHAVIORAL HEALTH - PROVIDER TOKENS
FREE:[LAST:[[facility]],PHONE:[(   )    -],FAX:[(   )    -],ADDRESS:[See SW note for f/u appointments]]

## 2021-09-17 NOTE — DISCHARGE NOTE BEHAVIORAL HEALTH - MEDICATION SUMMARY - MEDICATIONS TO STOP TAKING
I will STOP taking the medications listed below when I get home from the hospital:    clonazePAM 0.5 mg oral tablet  -- 1 tab(s) by mouth every 8 hours, As needed, anxiety MDD:1.5mg    nicotine 7 mg/24 hr transdermal film, extended release  -- 1 patch by transdermal patch once a day    nicotine 2 mg oral transmucosal lozenge  -- 1 lozenge oral transmucosal every 2 hours

## 2021-09-17 NOTE — DISCHARGE NOTE BEHAVIORAL HEALTH - NSBHDCCRISISPLAN3FT_PSY_A_CORE
Discuss in treatment with counselor, attended a local/online self-help group, call a hotline ([facility] (Substance Abuse and Mental Health Services Administration)[phone])

## 2021-09-17 NOTE — PROGRESS NOTE BEHAVIORAL HEALTH - NSBHFUPINTERVALHXFT_PSY_A_CORE
Pt seen . Once again calm and cooperative though still med refusing related to recommended antipsychotic medications in an effort  to alleviate the pt's longstanding, chronic affective psychosis.  The pt appeared more rested and is eating well.    Pt appeared with better attention to her ADLs and to her grooming.  The pt has  now remained largely   compliant with her Lisinopril and her Atorvastatin for treatment of her HTN and hyperlipidemia,. More recently, the pt has begun taking her prescribed low dose Seroquel 50 mg po qhs with good effect  and with no adverse effects .  The pt continues to attend  occasional therapy groups and offers support to other group members while remaining uncharacteristically silent as to her own trials and tribulations.  Though the pt remains with symptoms of her chronic affective psychosis, she has, nonetheless, been able to maintain a marginal  safe existence in the community, with the pt's ongoing psychotropic medication  at this time her right and her preference do to no current acute safety concerns.     The pt currently denied SI /HI but had endorsed intermittent non command type  AH and VH .The pt's judgment remains  impaired with little insight into the nature and severity of her presumptive schizoaffective disorder and reported h/o ETOH use d/o and CBD  . As above, the  pt remains vehemently opposed to any trials of any antipsychotic or mood stabilizing medications .    The pt was again encouraged to follow up with her outpatient psychiatric and cardiology appointments in [place] when she is discharged home . The pt will be followed for therapy by [facility]. .  The pt was also encouraged to DC ETOH and to cut back on her overall use of medical THC in place of recommended antipsychotic medications.

## 2021-09-17 NOTE — DISCHARGE NOTE BEHAVIORAL HEALTH - CARE PROVIDER_API CALL
[facility],   See SW note for f/u appointments  Phone: (   )    -  Fax: (   )    -  Follow Up Time:

## 2021-09-17 NOTE — DISCHARGE NOTE BEHAVIORAL HEALTH - NSBHDCREFEROTHERFT_PSY_A_CORE
You should follow up with a cardiologist following discharge. See resources below:     Dr. Venugopal Palla, [place], [place], [phone] You should follow up with a cardiologist following discharge. See resources below:     Dr. Venugopal Palla, [place], [place], [phone]    Dr. Bobo Head, [place], [phone]

## 2021-09-17 NOTE — DISCHARGE NOTE BEHAVIORAL HEALTH - PAST PSYCHIATRIC HISTORY
Per pt's 5N hx, pt with multiple psych admissions, [facility], as well as [facility] 1 month ago. Pt was also at  in Feb of 2021.

## 2021-09-17 NOTE — DISCHARGE NOTE BEHAVIORAL HEALTH - NSBHDCCRISISPLAN2FT_PSY_A_CORE
Call [facility] 5N: [phone]  : Marija Bush LMSW  Psychiatrists- Dr. Toya Kaplan

## 2021-09-17 NOTE — DISCHARGE NOTE BEHAVIORAL HEALTH - NSBHDCTESTSFT_PSY_A_CORE
VR= 55  	QT /QTc= 448/428  NS T Wave  Abnormality    COVID-19 PCR: Not-Detected (16 Sep 2021 10:00)  COVID-19 PCR: Not-Detected (09 Sep 2021 14:30) VR= 55  	QT /QTc= 448/428  NS T Wave  Abnormality    JOSIAS 194/ TRIG 123/ HDL 49/   AIC 5.2    COVID-19 PCR: Not-Detected (16 Sep 2021 10:00)  COVID-19 PCR: Not-Detected (09 Sep 2021 14:30)

## 2021-09-17 NOTE — DISCHARGE NOTE BEHAVIORAL HEALTH - CONDITIONS AT DISCHARGE
pt discharged with all belongings and verbalized understanding of all d/c plans Patient alert, oriented x3. Therapist and nurse reviewed safety plan with patient who denies any thoughts to self harm or others. SW and nurse reviewed discharge plan with patient who is in agreement with plan. Patient received a copy of her discharge instructions. All belongings returned to patient.

## 2021-09-17 NOTE — DISCHARGE NOTE BEHAVIORAL HEALTH - NSBHDCPURPOSE1FT_PSY_A_CORE
Outpatient mental health treatment. You have an appointment at [facility] for an intake on 9/22/21 at 9:30 AM.

## 2021-09-17 NOTE — DISCHARGE NOTE BEHAVIORAL HEALTH - NSBHDCTHERAPYFT_PSY_A_CORE
The return of or any worsening symptoms that you had prior to hospitalization, and/or any thoughts to harm/kill yourself or others. Pt provided with individual and group therapies including safety planning and coping skills, along with psychoeducation related to diagnosis.

## 2021-09-17 NOTE — DISCHARGE NOTE BEHAVIORAL HEALTH - NSBHDCSUBSTHXFT_PSY_A_CORE
Per pt's 5N hx, smokes marijuana daily. Per chart, alcohol abuse (1-3 bottles of wine daily, hx of withdrawal but no seizures, hx of benzo misuse (Xanax, Valium). Pt denies substance abuse. Per pt's 5N hx, smokes Medical marijuana daily. Per chart, alcohol abuse (1-3 bottles of wine daily, hx of withdrawal but no seizures, hx of benzo misuse (Xanax, Valium). Pt denies substance abuse.

## 2021-09-17 NOTE — DISCHARGE NOTE BEHAVIORAL HEALTH - NSBHDCRESPONSEFT_PSY_A_CORE
Neutral as she is somewhat partially stable naturally, not acutely symptomatic and seems to have good sleep/appetite

## 2021-09-17 NOTE — PROGRESS NOTE BEHAVIORAL HEALTH - PROBLEM SELECTOR PLAN 1
1. Pt now taking Seroquel once again with good effect and with no side effects  to alleviate her severe affective psychosis  2.Melatonin increased to 10 mg po qhs ( insomnia)  3.Pt encouraged to attend therapy groups as tolerated  4.Disposition planning ongoing with pt to be referred to a shelter for emergency housing.  5.Pt to have follow up appointments at [facility] and with Cardiology as above.   6.Safety planning reviewed.

## 2021-09-17 NOTE — DISCHARGE NOTE BEHAVIORAL HEALTH - MEDICATION SUMMARY - MEDICATIONS TO TAKE
I will START or STAY ON the medications listed below when I get home from the hospital:    aspirin 81 mg oral delayed release tablet  -- 1 tab(s) by mouth once a day x 30 days   -- Indication: For Cardio protective    lisinopril 5 mg oral tablet  -- 1 tab(s) by mouth once a day x 30 days   -- Indication: For HTN    loratadine 10 mg oral tablet  -- 1 tab(s) by mouth once a day  -- Indication: For Allergy    atorvastatin 10 mg oral tablet  -- 1 tab(s) by mouth once a day (at bedtime) x 30 days   -- Indication: For HLD    melatonin 10 mg oral tablet  -- 1 tab(s) by mouth once a day (at bedtime) x 30 days   -- Indication: For Sleep

## 2021-09-18 RX ADMIN — Medication 600 MILLIGRAM(S): at 10:14

## 2021-09-18 RX ADMIN — LISINOPRIL 5 MILLIGRAM(S): 2.5 TABLET ORAL at 09:14

## 2021-09-18 RX ADMIN — LIDOCAINE 1 PATCH: 4 CREAM TOPICAL at 09:14

## 2021-09-18 RX ADMIN — ATORVASTATIN CALCIUM 10 MILLIGRAM(S): 80 TABLET, FILM COATED ORAL at 21:06

## 2021-09-18 RX ADMIN — Medication 10 MILLIGRAM(S): at 21:04

## 2021-09-18 RX ADMIN — Medication 600 MILLIGRAM(S): at 09:14

## 2021-09-18 RX ADMIN — Medication 2 MILLIGRAM(S): at 13:45

## 2021-09-18 RX ADMIN — Medication 600 MILLIGRAM(S): at 18:00

## 2021-09-18 RX ADMIN — Medication 1 PATCH: at 09:13

## 2021-09-18 RX ADMIN — Medication 81 MILLIGRAM(S): at 09:14

## 2021-09-18 RX ADMIN — Medication 600 MILLIGRAM(S): at 19:00

## 2021-09-18 RX ADMIN — LORATADINE 10 MILLIGRAM(S): 10 TABLET ORAL at 09:14

## 2021-09-18 RX ADMIN — Medication 2 MILLIGRAM(S): at 07:45

## 2021-09-19 RX ADMIN — Medication 1 PATCH: at 09:39

## 2021-09-19 RX ADMIN — LISINOPRIL 5 MILLIGRAM(S): 2.5 TABLET ORAL at 09:38

## 2021-09-19 RX ADMIN — LIDOCAINE 1 PATCH: 4 CREAM TOPICAL at 09:39

## 2021-09-19 RX ADMIN — Medication 2 MILLIGRAM(S): at 19:09

## 2021-09-19 RX ADMIN — Medication 600 MILLIGRAM(S): at 10:38

## 2021-09-19 RX ADMIN — Medication 10 MILLIGRAM(S): at 20:43

## 2021-09-19 RX ADMIN — BACITRACIN AND POLYMYXIN B SULFATE 1 APPLICATION(S): 500; 10000 OINTMENT TOPICAL at 12:37

## 2021-09-19 RX ADMIN — Medication 30 MILLILITER(S): at 13:35

## 2021-09-19 RX ADMIN — Medication 2 MILLIGRAM(S): at 15:37

## 2021-09-19 RX ADMIN — Medication 2 MILLIGRAM(S): at 12:38

## 2021-09-19 RX ADMIN — Medication 1 PATCH: at 10:39

## 2021-09-19 RX ADMIN — LORATADINE 10 MILLIGRAM(S): 10 TABLET ORAL at 09:38

## 2021-09-19 RX ADMIN — Medication 30 MILLILITER(S): at 21:05

## 2021-09-19 RX ADMIN — ATORVASTATIN CALCIUM 10 MILLIGRAM(S): 80 TABLET, FILM COATED ORAL at 20:43

## 2021-09-19 RX ADMIN — Medication 600 MILLIGRAM(S): at 09:38

## 2021-09-19 RX ADMIN — Medication 81 MILLIGRAM(S): at 09:38

## 2021-09-19 RX ADMIN — Medication 2 MILLIGRAM(S): at 06:55

## 2021-09-19 RX ADMIN — Medication 2 MILLIGRAM(S): at 09:45

## 2021-09-20 VITALS — RESPIRATION RATE: 14 BRPM | OXYGEN SATURATION: 100 % | TEMPERATURE: 99 F

## 2021-09-20 PROCEDURE — 99239 HOSP IP/OBS DSCHRG MGMT >30: CPT

## 2021-09-20 RX ORDER — LANOLIN ALCOHOL/MO/W.PET/CERES
1 CREAM (GRAM) TOPICAL
Qty: 30 | Refills: 0
Start: 2021-09-20 | End: 2021-10-19

## 2021-09-20 RX ORDER — ASPIRIN/CALCIUM CARB/MAGNESIUM 324 MG
1 TABLET ORAL
Qty: 30 | Refills: 0
Start: 2021-09-20 | End: 2021-10-19

## 2021-09-20 RX ORDER — LISINOPRIL 2.5 MG/1
1 TABLET ORAL
Qty: 30 | Refills: 0
Start: 2021-09-20 | End: 2021-10-19

## 2021-09-20 RX ORDER — ATORVASTATIN CALCIUM 80 MG/1
1 TABLET, FILM COATED ORAL
Qty: 30 | Refills: 0
Start: 2021-09-20 | End: 2021-10-19

## 2021-09-20 RX ADMIN — BACITRACIN AND POLYMYXIN B SULFATE 1 APPLICATION(S): 500; 10000 OINTMENT TOPICAL at 10:11

## 2021-09-20 RX ADMIN — Medication 600 MILLIGRAM(S): at 10:10

## 2021-09-20 RX ADMIN — Medication 1 PATCH: at 10:10

## 2021-09-20 RX ADMIN — LORATADINE 10 MILLIGRAM(S): 10 TABLET ORAL at 10:10

## 2021-09-20 RX ADMIN — Medication 1 PATCH: at 10:02

## 2021-09-20 RX ADMIN — Medication 1 PATCH: at 07:44

## 2021-09-20 RX ADMIN — Medication 2 MILLIGRAM(S): at 08:12

## 2021-09-20 RX ADMIN — LISINOPRIL 5 MILLIGRAM(S): 2.5 TABLET ORAL at 10:10

## 2021-09-20 RX ADMIN — Medication 30 MILLILITER(S): at 10:11

## 2021-09-20 RX ADMIN — LIDOCAINE 1 PATCH: 4 CREAM TOPICAL at 10:11

## 2021-09-20 RX ADMIN — Medication 81 MILLIGRAM(S): at 10:10

## 2021-09-20 NOTE — PROGRESS NOTE BEHAVIORAL HEALTH - BEHAVIOR
Uncooperative/Hostile
Uncooperative/Other
Cooperative
Cooperative
Uncooperative/Other
Cooperative
Uncooperative/Hostile
Cooperative
Cooperative

## 2021-09-20 NOTE — PROGRESS NOTE BEHAVIORAL HEALTH - NSBHADMITIPOBSFT_PSY_A_CORE
Pt currently denies SI /HI

## 2021-09-20 NOTE — PROGRESS NOTE BEHAVIORAL HEALTH - THOUGHT PROCESS
Overinclusive/Circumstantial/Tangential/Illogical/Impaired reasoning
Linear/Overinclusive/Circumstantial/Tangential/Illogical/Impaired reasoning
Overinclusive/Circumstantial/Tangential/Illogical/Impaired reasoning/Disorganized
Overinclusive/Circumstantial/Tangential/Illogical/Impaired reasoning/Disorganized
Overinclusive/Circumstantial/Tangential/Illogical/Impaired reasoning
Overinclusive/Circumstantial/Tangential/Illogical/Impaired reasoning
Linear/Overinclusive/Circumstantial/Tangential/Illogical/Impaired reasoning
Overinclusive/Circumstantial/Tangential/Flight of ideas/Illogical/Impaired reasoning
Overinclusive/Circumstantial/Tangential/Flight of ideas/Illogical/Impaired reasoning/Disorganized

## 2021-09-20 NOTE — PROGRESS NOTE BEHAVIORAL HEALTH - SUMMARY
The pt  is a 60 year old single, unemployed,  female, non-caregiver, homeless,  h/o, alcohol abuse (1-3 bottles of wine daily, history of withdrawals but no seizures), history of benzodiazepine misuse (Xanax, Valium), history bipolar 2, several inpatient hospitalizations (most recently in [facility]; 1 month ago,  one prior suicide attempt by overdose in 2012, reported axis II - histrionic traits, noncompliant with treatment,  no hx of violence, no hx of arrests, BIB police for  and VH. Pt states "I am not schizophrenic, I am not depressed". Denies SI, HI, but reports hearing and seeing devil. She states "I am psychic".   Pt is requesting voluntary admission. denies travels, but she was not vaccinated and does not want to be vaccinated. Pt state she was exposed to a lot of people in public transportation.     Plan:   Hold for reassessment, pending COVID.   discussed with Dr Fonseca and wilL give verbal handoff to telepsych.
The pt  is a 60 year old single, unemployed,  female, non-caregiver, homeless,  h/o, alcohol abuse (1-3 bottles of wine daily, history of withdrawals but no seizures), history of benzodiazepine misuse (Xanax, Valium), history bipolar 2, several inpatient hospitalizations (most recently in [facility]; 1 month ago,  one prior suicide attempt by overdose in 2012, reported axis II - histrionic traits, noncompliant with treatment,  no hx of violence, no hx of arrests, BIB police for  and VH. Pt states "I am not schizophrenic, I am not depressed". Denies SI, HI, but reports hearing and seeing devil. She states "I am psychic".   Pt is requesting voluntary admission. denies travels, but she was not vaccinated and does not want to be vaccinated. Pt state she was exposed to a lot of people in public transportation.     Plan:   Hold for reassessment, pending COVID.   discussed with Dr Fonseca and wilL give verbal handoff to telepsych.
The pt  is a 60 year old single, unemployed,  female, non-caregiver, homeless,  h/o, alcohol abuse (1-3 bottles of wine daily, history of withdrawals but no seizures), history of benzodiazepine misuse (Xanax, Valium), history bipolar 2, several inpatient hospitalizations (most recently in [facility]; 1 month ago,  one prior suicide attempt by overdose in 2012, reported axis II - histrionic traits, noncompliant with treatment,  no hx of violence, no hx of arrests, BIB police for  and VH. Pt states "I am not schizophrenic, I am not depressed". Denies SI, HI, but reports hearing and seeing devil. She states "I am psychic".   Pt is requesting voluntary admission. denies travels, but she was not vaccinated and does not want to be vaccinated. Pt state she was exposed to a lot of people in public transportation.     Plan:   Hold for reassessment, pending COVID.   discussed with Dr Fonseca and wilL give verbal handoff to telepsych.
The pt  is a 60 year old single, unemployed,  female, non-caregiver, homeless,  h/o, alcohol abuse (1-3 bottles of wine daily, history of withdrawals but no seizures), history of benzodiazepine misuse (Xanax, Valium), history bipolar 2, several inpatient hospitalizations (most recently in [facility]; 1 month ago,  one prior suicide attempt by overdose in 2012, reported axis II - histrionic traits, noncompliant with treatment,  no hx of violence, no hx of arrests, BIB police for  and VH. Pt states "I am not schizophrenic, I am not depressed". Denies SI, HI, but reports hearing and seeing devil. She states "I am psychic".   Pt is requesting voluntary admission. denies travels, but she was not vaccinated and does not want to be vaccinated. Pt state she was exposed to a lot of people in public transportation.     Plan:   Hold for reassessment, pending COVID.   discussed with Dr Fonseca and wilL give verbal handoff to telepsych.
The pt  is a 60 year old single, unemployed,  female, non-caregiver, homeless,  h/o, alcohol abuse (1-3 bottles of wine daily, history of withdrawals but no seizures), history of benzodiazepine misuse (Xanax, Valium), history bipolar 2, several inpatient hospitalizations (most recently in [facility]; 1 month ago,  one prior suicide attempt by overdose in 2012, reported axis II - histrionic traits, noncompliant with treatment,  no hx of violence, no hx of arrests.  Plan: continue current plan. Encourage to take meds and provide with psychoeducation.      melatonin 5 milliGRAM(s) Oral at bedtime  nicotine - 21 mG/24Hr(s) Patch 1 patch Transdermal daily  suggest Abilify low dose 2 mg daily  for mood and psychosis
The pt  is a 60 year old single, unemployed,  female, non-caregiver, homeless,  h/o, alcohol abuse (1-3 bottles of wine daily, history of withdrawals but no seizures), history of benzodiazepine misuse (Xanax, Valium), history bipolar 2, several inpatient hospitalizations (most recently in [facility]; 1 month ago,  one prior suicide attempt by overdose in 2012, reported axis II - histrionic traits, noncompliant with treatment,  no hx of violence, no hx of arrests.  Plan: continue current plan. Encourage to take meds and provide with psychoeducation.      melatonin 5 milliGRAM(s) Oral at bedtime  nicotine - 21 mG/24Hr(s) Patch 1 patch Transdermal daily  PT cannot sleep. suggested Seroquel at HS 50mg   Pt blames Seroquel for weight gain, not marijuana.
The pt  is a 60 year old single, unemployed,  female, non-caregiver, homeless,  h/o, alcohol abuse (1-3 bottles of wine daily, history of withdrawals but no seizures), history of benzodiazepine misuse (Xanax, Valium), history bipolar 2, several inpatient hospitalizations (most recently in [facility]; 1 month ago,  one prior suicide attempt by overdose in 2012, reported axis II - histrionic traits, noncompliant with treatment,  no hx of violence, no hx of arrests, BIB police for  and VH. Pt states "I am not schizophrenic, I am not depressed". Denies SI, HI, but reports hearing and seeing devil. She states "I am psychic".   Pt is requesting voluntary admission. denies travels, but she was not vaccinated and does not want to be vaccinated. Pt state she was exposed to a lot of people in public transportation.     Plan:   Hold for reassessment, pending COVID.   discussed with Dr Fonseca and wilL give verbal handoff to telepsych.
The pt  is a 60 year old single, unemployed,  female, non-caregiver, homeless,  h/o, alcohol abuse (1-3 bottles of wine daily, history of withdrawals but no seizures), history of benzodiazepine misuse (Xanax, Valium), history bipolar 2, several inpatient hospitalizations (most recently in [facility]; 1 month ago,  one prior suicide attempt by overdose in 2012, reported axis II - histrionic traits, noncompliant with treatment,  no hx of violence, no hx of arrests, BIB police for  and VH. Pt states "I am not schizophrenic, I am not depressed". Denies SI, HI, but reports hearing and seeing devil. She states "I am psychic".   Pt is requesting voluntary admission. denies travels, but she was not vaccinated and does not want to be vaccinated. Pt state she was exposed to a lot of people in public transportation.     Plan:   Hold for reassessment, pending COVID.   discussed with Dr Fonseca and wilL give verbal handoff to telepsych.
The pt  is a 60 year old single, unemployed,  female, non-caregiver, homeless,  h/o, alcohol abuse (1-3 bottles of wine daily, history of withdrawals but no seizures), history of benzodiazepine misuse (Xanax, Valium), history bipolar 2, several inpatient hospitalizations (most recently in [facility]; 1 month ago,  one prior suicide attempt by overdose in 2012, reported axis II - histrionic traits, noncompliant with treatment,  no hx of violence, no hx of arrests, BIB police for  and VH. Pt states "I am not schizophrenic, I am not depressed". Denies SI, HI, but reports hearing and seeing devil. She states "I am psychic".   Pt is requesting voluntary admission. denies travels, but she was not vaccinated and does not want to be vaccinated. Pt state she was exposed to a lot of people in public transportation.     Plan:   Hold for reassessment, pending COVID.   discussed with Dr Fonseca and wilL give verbal handoff to telepsych.

## 2021-09-20 NOTE — PROGRESS NOTE BEHAVIORAL HEALTH - NSBHCHARTREVIEWINVESTIGATE_PSY_A_CORE FT
EKG  VR= 55  QT /QTc= 448/428  NS T Wave  Abnormality
EKG rechecked 9/15/2021  Largely unchanged   VR= 55  QT /QTc= 448/428  NS T Wave  Abnormality
EKG  VR= 55  QT /QTc= 448/428  NS T Wave  Abnormality
EKG rechecked 9/15/2021  Largely unchanged   VR= 55  QT /QTc= 448/428  NS T Wave  Abnormality
EKG  VR= 55  QT /QTc= 448/428  NS T Wave  Abnormality

## 2021-09-20 NOTE — PROGRESS NOTE BEHAVIORAL HEALTH - AXIS III
heart disease, htn, chronic back pain

## 2021-09-20 NOTE — PROGRESS NOTE BEHAVIORAL HEALTH - THOUGHT CONTENT
Delusions/Ideas of reference/Other
Delusions/Ideas of reference/Obsessions/Ruminations/Other
Delusions/Ideas of reference/Other
Delusions/Ideas of reference/Obsessions/Ruminations/Other
Delusions/Ideas of reference/Other

## 2021-09-20 NOTE — PROGRESS NOTE BEHAVIORAL HEALTH - RISK ASSESSMENT
Low current risk for suicide but moderate risk for unpredictable aggression/ assaultive behavior due to severity of her affective psychosis  Acute risk factors- current florid psychosis, entrenched med noncompliance, homeless, socially isolated                            active ongoing polysubstance use 9 ETOH, Benzodiazepines, THC, CBD)  Chronic risk factor- early onset severe affective psychosis, multiple hospital admissions related to pt noncompliance with all medications clinically recommended for efficacy  Protective factors- pt is intelligent, creative, pt able to maintain a marginal existence when clinically improved  Mitigating factors- pt currently in safe setting of an inpatient hospital, pt refusing medically needed psychotropic medications but sleep hygiene and appetite are being addressed,

## 2021-09-20 NOTE — PROGRESS NOTE BEHAVIORAL HEALTH - SECONDARY DX2
Nonadherence to medication

## 2021-09-20 NOTE — PROGRESS NOTE BEHAVIORAL HEALTH - NSBHCHARTREVIEWIMAGING_PSY_A_CORE FT
MEDICATIONS  (STANDING):  aspirin enteric coated 81 milliGRAM(s) Oral daily  atorvastatin 10 milliGRAM(s) Oral at bedtime  bacitracin/polymyxin B Ointment 1 Application(s) Topical three times a day  lidocaine   4% Patch 1 Patch Transdermal daily  lisinopril 5 milliGRAM(s) Oral daily  loratadine 10 milliGRAM(s) Oral daily  melatonin 10 milliGRAM(s) Oral at bedtime  nicotine - 21 mG/24Hr(s) Patch 1 patch Transdermal daily  QUEtiapine 50 milliGRAM(s) Oral at bedtime    MEDICATIONS  (PRN):  acetaminophen   Tablet .. 650 milliGRAM(s) Oral every 6 hours PRN Temp greater or equal to 38C (100.4F), Mild Pain (1 - 3), Moderate Pain (4 - 6)  aluminum hydroxide/magnesium hydroxide/simethicone Suspension 30 milliLiter(s) Oral every 6 hours PRN Dyspepsia  diphenhydrAMINE   Injectable 50 milliGRAM(s) IntraMuscular every 6 hours PRN Agitation  haloperidol    Injectable 5 milliGRAM(s) IntraMuscular every 6 hours PRN Agitation  ibuprofen  Tablet. 600 milliGRAM(s) Oral every 8 hours PRN Temp greater or equal to 38C (100.4F), Mild Pain (1 - 3), Moderate Pain (4 - 6)  LORazepam     Tablet 1 milliGRAM(s) Oral every 1 hour PRN CIWA-Ar score 8 or greater  LORazepam   Injectable 2 milliGRAM(s) IntraMuscular every 4 hours PRN Agitation  magnesium hydroxide Suspension 30 milliLiter(s) Oral daily PRN Constipation  nicotine  Polacrilex Gum 2 milliGRAM(s) Oral every 2 hours PRN nicotine craving
MEDICATIONS  (STANDING):  aspirin enteric coated 81 milliGRAM(s) Oral daily  atorvastatin 10 milliGRAM(s) Oral at bedtime  bacitracin/polymyxin B Ointment 1 Application(s) Topical three times a day  lidocaine   4% Patch 1 Patch Transdermal daily  lisinopril 5 milliGRAM(s) Oral daily  loratadine 10 milliGRAM(s) Oral daily  melatonin 5 milliGRAM(s) Oral at bedtime  nicotine - 21 mG/24Hr(s) Patch 1 patch Transdermal daily  QUEtiapine 50 milliGRAM(s) Oral at bedtime    MEDICATIONS  (PRN):  acetaminophen   Tablet .. 650 milliGRAM(s) Oral every 6 hours PRN Temp greater or equal to 38C (100.4F), Mild Pain (1 - 3), Moderate Pain (4 - 6)  aluminum hydroxide/magnesium hydroxide/simethicone Suspension 30 milliLiter(s) Oral every 6 hours PRN Dyspepsia  diphenhydrAMINE   Injectable 50 milliGRAM(s) IntraMuscular every 6 hours PRN Agitation  haloperidol    Injectable 5 milliGRAM(s) IntraMuscular every 6 hours PRN Agitation  ibuprofen  Tablet. 600 milliGRAM(s) Oral every 8 hours PRN Temp greater or equal to 38C (100.4F), Mild Pain (1 - 3), Moderate Pain (4 - 6)  LORazepam     Tablet 1 milliGRAM(s) Oral every 1 hour PRN CIWA-Ar score 8 or greater  LORazepam   Injectable 2 milliGRAM(s) IntraMuscular every 4 hours PRN Agitation  magnesium hydroxide Suspension 30 milliLiter(s) Oral daily PRN Constipation  nicotine  Polacrilex Gum 2 milliGRAM(s) Oral every 2 hours PRN nicotine craving
MEDICATIONS  (STANDING):  aspirin enteric coated 81 milliGRAM(s) Oral daily  atorvastatin 10 milliGRAM(s) Oral at bedtime  bacitracin/polymyxin B Ointment 1 Application(s) Topical three times a day  lidocaine   4% Patch 1 Patch Transdermal daily  lisinopril 5 milliGRAM(s) Oral daily  loratadine 10 milliGRAM(s) Oral daily  melatonin 5 milliGRAM(s) Oral at bedtime  nicotine - 21 mG/24Hr(s) Patch 1 patch Transdermal daily  QUEtiapine 50 milliGRAM(s) Oral at bedtime ( pt refusing)    MEDICATIONS  (PRN):  acetaminophen   Tablet .. 650 milliGRAM(s) Oral every 6 hours PRN Temp greater or equal to 38C (100.4F), Mild Pain (1 - 3), Moderate Pain (4 - 6)  aluminum hydroxide/magnesium hydroxide/simethicone Suspension 30 milliLiter(s) Oral every 6 hours PRN Dyspepsia  diphenhydrAMINE   Injectable 50 milliGRAM(s) IntraMuscular every 6 hours PRN Agitation  haloperidol    Injectable 5 milliGRAM(s) IntraMuscular every 6 hours PRN Agitation  ibuprofen  Tablet. 600 milliGRAM(s) Oral every 8 hours PRN Temp greater or equal to 38C (100.4F), Mild Pain (1 - 3), Moderate Pain (4 - 6)  LORazepam     Tablet 1 milliGRAM(s) Oral every 1 hour PRN CIWA-Ar score 8 or greater  LORazepam   Injectable 2 milliGRAM(s) IntraMuscular every 4 hours PRN Agitation  magnesium hydroxide Suspension 30 milliLiter(s) Oral daily PRN Constipation  nicotine  Polacrilex Gum 2 milliGRAM(s) Oral every 2 hours PRN nicotine craving
MEDICATIONS  (STANDING):  aspirin enteric coated 81 milliGRAM(s) Oral daily  atorvastatin 10 milliGRAM(s) Oral at bedtime  bacitracin/polymyxin B Ointment 1 Application(s) Topical three times a day  lidocaine   4% Patch 1 Patch Transdermal daily  lisinopril 5 milliGRAM(s) Oral daily  loratadine 10 milliGRAM(s) Oral daily  melatonin 5 milliGRAM(s) Oral at bedtime  nicotine - 21 mG/24Hr(s) Patch 1 patch Transdermal daily  QUEtiapine 50 milliGRAM(s) Oral at bedtime    MEDICATIONS  (PRN):  acetaminophen   Tablet .. 650 milliGRAM(s) Oral every 6 hours PRN Temp greater or equal to 38C (100.4F), Mild Pain (1 - 3), Moderate Pain (4 - 6)  aluminum hydroxide/magnesium hydroxide/simethicone Suspension 30 milliLiter(s) Oral every 6 hours PRN Dyspepsia  diphenhydrAMINE   Injectable 50 milliGRAM(s) IntraMuscular every 6 hours PRN Agitation  haloperidol    Injectable 5 milliGRAM(s) IntraMuscular every 6 hours PRN Agitation  ibuprofen  Tablet. 600 milliGRAM(s) Oral every 8 hours PRN Temp greater or equal to 38C (100.4F), Mild Pain (1 - 3), Moderate Pain (4 - 6)  LORazepam     Tablet 1 milliGRAM(s) Oral every 1 hour PRN CIWA-Ar score 8 or greater  LORazepam   Injectable 2 milliGRAM(s) IntraMuscular every 4 hours PRN Agitation  magnesium hydroxide Suspension 30 milliLiter(s) Oral daily PRN Constipation  nicotine  Polacrilex Gum 2 milliGRAM(s) Oral every 2 hours PRN nicotine craving
MEDICATIONS  (STANDING):  aspirin enteric coated 81 milliGRAM(s) Oral daily  atorvastatin 10 milliGRAM(s) Oral at bedtime  bacitracin/polymyxin B Ointment 1 Application(s) Topical three times a day  lidocaine   4% Patch 1 Patch Transdermal daily  lisinopril 5 milliGRAM(s) Oral daily  loratadine 10 milliGRAM(s) Oral daily  melatonin 10 milliGRAM(s) Oral at bedtime  nicotine - 21 mG/24Hr(s) Patch 1 patch Transdermal daily  QUEtiapine 50 milliGRAM(s) Oral at bedtime    MEDICATIONS  (PRN):  acetaminophen   Tablet .. 650 milliGRAM(s) Oral every 6 hours PRN Temp greater or equal to 38C (100.4F), Mild Pain (1 - 3), Moderate Pain (4 - 6)  aluminum hydroxide/magnesium hydroxide/simethicone Suspension 30 milliLiter(s) Oral every 6 hours PRN Dyspepsia  diphenhydrAMINE   Injectable 50 milliGRAM(s) IntraMuscular every 6 hours PRN Agitation  haloperidol    Injectable 5 milliGRAM(s) IntraMuscular every 6 hours PRN Agitation  ibuprofen  Tablet. 600 milliGRAM(s) Oral every 8 hours PRN Temp greater or equal to 38C (100.4F), Mild Pain (1 - 3), Moderate Pain (4 - 6)  LORazepam     Tablet 1 milliGRAM(s) Oral every 1 hour PRN CIWA-Ar score 8 or greater  LORazepam   Injectable 2 milliGRAM(s) IntraMuscular every 4 hours PRN Agitation  magnesium hydroxide Suspension 30 milliLiter(s) Oral daily PRN Constipation  nicotine  Polacrilex Gum 2 milliGRAM(s) Oral every 2 hours PRN nicotine craving
MEDICATIONS  (STANDING):  aspirin enteric coated 81 milliGRAM(s) Oral daily  atorvastatin 10 milliGRAM(s) Oral at bedtime  bacitracin/polymyxin B Ointment 1 Application(s) Topical three times a day  lidocaine   4% Patch 1 Patch Transdermal daily  lisinopril 5 milliGRAM(s) Oral daily  loratadine 10 milliGRAM(s) Oral daily  melatonin 3 milliGRAM(s) Oral at bedtime  nicotine - 21 mG/24Hr(s) Patch 1 patch Transdermal daily    MEDICATIONS  (PRN):  acetaminophen   Tablet .. 650 milliGRAM(s) Oral every 6 hours PRN Temp greater or equal to 38C (100.4F), Mild Pain (1 - 3), Moderate Pain (4 - 6)  aluminum hydroxide/magnesium hydroxide/simethicone Suspension 30 milliLiter(s) Oral every 6 hours PRN Dyspepsia  diphenhydrAMINE   Injectable 50 milliGRAM(s) IntraMuscular every 6 hours PRN Agitation  haloperidol    Injectable 5 milliGRAM(s) IntraMuscular every 6 hours PRN Agitation  LORazepam     Tablet 1 milliGRAM(s) Oral every 1 hour PRN CIWA-Ar score 8 or greater  LORazepam   Injectable 2 milliGRAM(s) IntraMuscular every 4 hours PRN Agitation  magnesium hydroxide Suspension 30 milliLiter(s) Oral daily PRN Constipation  nicotine  Polacrilex Gum 2 milliGRAM(s) Oral every 2 hours PRN nicotine craving
MEDICATIONS  (STANDING):  aspirin enteric coated 81 milliGRAM(s) Oral daily  atorvastatin 10 milliGRAM(s) Oral at bedtime  bacitracin/polymyxin B Ointment 1 Application(s) Topical three times a day  lidocaine   4% Patch 1 Patch Transdermal daily  lisinopril 5 milliGRAM(s) Oral daily  loratadine 10 milliGRAM(s) Oral daily  melatonin 5 milliGRAM(s) Oral at bedtime  nicotine - 21 mG/24Hr(s) Patch 1 patch Transdermal daily  QUEtiapine 50 milliGRAM(s) Oral at bedtime ( pt refusing)    MEDICATIONS  (PRN):  acetaminophen   Tablet .. 650 milliGRAM(s) Oral every 6 hours PRN Temp greater or equal to 38C (100.4F), Mild Pain (1 - 3), Moderate Pain (4 - 6)  aluminum hydroxide/magnesium hydroxide/simethicone Suspension 30 milliLiter(s) Oral every 6 hours PRN Dyspepsia  diphenhydrAMINE   Injectable 50 milliGRAM(s) IntraMuscular every 6 hours PRN Agitation  haloperidol    Injectable 5 milliGRAM(s) IntraMuscular every 6 hours PRN Agitation  ibuprofen  Tablet. 600 milliGRAM(s) Oral every 8 hours PRN Temp greater or equal to 38C (100.4F), Mild Pain (1 - 3), Moderate Pain (4 - 6)  LORazepam     Tablet 1 milliGRAM(s) Oral every 1 hour PRN CIWA-Ar score 8 or greater  LORazepam   Injectable 2 milliGRAM(s) IntraMuscular every 4 hours PRN Agitation  magnesium hydroxide Suspension 30 milliLiter(s) Oral daily PRN Constipation  nicotine  Polacrilex Gum 2 milliGRAM(s) Oral every 2 hours PRN nicotine craving

## 2021-09-20 NOTE — PROGRESS NOTE BEHAVIORAL HEALTH - NSBHADMITMEDEDUDETAILS_A_CORE FT
Pt opposed  to any and all psychotropic medications

## 2021-09-20 NOTE — PROGRESS NOTE BEHAVIORAL HEALTH - DETAILS
pt reported restless legs after Seroquel but the pt is taking this medication now with NO ADVERSE EFFECTS
pt reported restless legs after Seroquel
pt reported restless legs after Seroquel but the pt is taking this medication now with NO ADVERSE EFFECTS
pt reported restless legs after Seroquel

## 2021-09-20 NOTE — PROGRESS NOTE BEHAVIORAL HEALTH - NSBHFUPTYPE_PSY_A_CORE
Inpatient-On Service Note
Inpatient

## 2021-09-20 NOTE — PROGRESS NOTE BEHAVIORAL HEALTH - PROBLEM SELECTOR PLAN 1
1. Pt now taking Seroquel once again with good effect and with no side effects  to alleviate her severe affective psychosis  2.Melatonin increased to 10 mg po qhs ( insomnia)  3.Pt encouraged to attend therapy groups as tolerated  4.Disposition planning ongoing with pt to be referred to a shelter for emergency housing.  5.Pt to have follow up appointments at [facility] and with Cardiology as above.   6.Safety planning reviewed.

## 2021-09-20 NOTE — PROGRESS NOTE BEHAVIORAL HEALTH - PROBLEM SELECTOR PLAN 3
1.Ongoing staff support and encouragement  2.Ongoing pt psychoeducation related to the importance of consistent effective medication treatment of her severely impairing affective psychosis  3.Pt encouraged to attend therapy groups with focus on treatment adherence

## 2021-09-20 NOTE — PROGRESS NOTE BEHAVIORAL HEALTH - OTHER
good. variable ongoing but slowly less intense paranoid, grandiose, somatic, Spiritism, delusional thinking

## 2021-09-20 NOTE — PROGRESS NOTE BEHAVIORAL HEALTH - PERCEPTIONS
Auditory hallucinations/Visual hallucinations

## 2021-09-20 NOTE — PROGRESS NOTE BEHAVIORAL HEALTH - NSBHFUPINTERVALCCFT_PSY_A_CORE
" My psoriasis is acting up again on my elbows and my face. I need cream for that. Now I have a 3 bedroom apartment [place] an [place] view in [place] I thought I was evicted from. But I wasn't."     The pt with c/o c/p . EKG STAT with no changes from recent EKG and Cardiology consult service follow up who had just signed off a day earlier with recommendation for pt follow up with outpatient Cardiology after pt DC from hospital when clinically stable.   Writer reconsulted Cardiology Dr. Foy on 9/15/2021  for follow up . Exam WNL. Atypical angina. Pt h/o palpitations. Plan for pt follow up with Dr. Palla in 2 weeks after pt DC from hospital for possible Holter monitoring.    09/17/2021: Covering MD--I don't take any Psych meds

## 2021-09-20 NOTE — PROGRESS NOTE BEHAVIORAL HEALTH - PROBLEM SELECTOR PLAN 2
1.Ongoing staff support for the pt to remain abstinent from all substances of potential abuse/ misuse  2.Pt urged to attend therapy groups with focus on substance use disorder treatment options  3.Disposition planning ongoing

## 2021-09-20 NOTE — PROGRESS NOTE BEHAVIORAL HEALTH - MOOD
Anxious/Other
Anxious/Irritable/Angry/Other
Anxious/Other
Other
Normal
Normal
Other
Anxious/Irritable/Angry/Other
Depressed/Anxious

## 2021-09-20 NOTE — PROGRESS NOTE BEHAVIORAL HEALTH - NS ED BHA MSE SPEECH RATE
Fast, difficult to interrupt
Normal
Fast, difficult to interrupt
Fast, difficult to interrupt
Normal

## 2021-09-20 NOTE — PROGRESS NOTE BEHAVIORAL HEALTH - NSBHPTASSESSDT_PSY_A_CORE
17-Sep-2021
11-Sep-2021 10:10
10-Sep-2021
12-Sep-2021 11:05
16-Sep-2021
15-Sep-2021
13-Sep-2021
14-Sep-2021
20-Sep-2021 13:07

## 2021-09-20 NOTE — PROGRESS NOTE BEHAVIORAL HEALTH - NSBHFUPVIOL_PSY_A_CORE
none known
normal

## 2021-09-20 NOTE — PROGRESS NOTE BEHAVIORAL HEALTH - NSBHFUPINTERVALHXFT_PSY_A_CORE
Pt seen . Once again calm and cooperative though still med refusing related to recommended antipsychotic medications in an effort  to alleviate the pt's longstanding, chronic affective psychosis.  The pt appeared more rested and is eating well.    Pt appeared with better attention to her ADLs and to her grooming.  The pt has  now remained largely   compliant with her Lisinopril and her Atorvastatin for treatment of her HTN and hyperlipidemia,. More recently, the pt has begun taking her prescribed low dose Seroquel 50 mg po qhs with good effect  and with no adverse effects .  The pt continues to attend  occasional therapy groups and offers support to other group members while remaining uncharacteristically silent as to her own trials and tribulations.  Though the pt remains with symptoms of her chronic affective psychosis, she has, nonetheless, been able to maintain a marginal  safe existence in the community, with the pt's ongoing psychotropic medication  at this time her right and her preference do to no current acute safety concerns.     The pt currently denied SI /HI but had endorsed intermittent non command type  AH and VH .The pt's judgment remains  impaired with little insight into the nature and severity of her presumptive schizoaffective disorder and reported h/o ETOH use d/o and CBD  . As above, the  pt remains vehemently opposed to any trials of any antipsychotic or mood stabilizing medications .    The pt was again encouraged to follow up with her outpatient psychiatric and cardiology appointments in [place] when she is discharged home . The pt will be followed for therapy by [facility]. .  The pt was also encouraged to DC ETOH and to cut back on her overall use of medical THC in place of recommended antipsychotic medications.    09/17/2021: Patient was seen today AM, chart reviewed and discussed in team. She endorses that she is doing  well, was calm, cooperative and endorses that she is not agreeing with Bipolar D/O diagnosis. She endorses that she refuses to take meds, not S/h and never tried to commit suicide. She smokes Medical THC daily and in-spite of addressing it she endorses that she feels calmer from using it.  She refused other drug abuse. She has normal sleep/appetite, she was given 30 days supply of medical meds for HTN; HLD and sleep. To be discharged today and F/U as per  referral.

## 2021-09-20 NOTE — PROGRESS NOTE BEHAVIORAL HEALTH - SECONDARY DX1
Polysubstance (excluding opioids) dependence

## 2021-09-20 NOTE — PROGRESS NOTE BEHAVIORAL HEALTH - AFFECT QUALITY
Elevated
Elevated/Irritable
Elevated/Irritable
Elevated
Elevated/Irritable
Irritable
Irritable

## 2021-09-20 NOTE — PROGRESS NOTE BEHAVIORAL HEALTH - ESTIMATED DISCHARGE DATE
22-Sep-2021
17-Sep-2021
22-Sep-2021
17-Sep-2021
17-Sep-2021

## 2021-09-24 DIAGNOSIS — Z91.14 PATIENT'S OTHER NONCOMPLIANCE WITH MEDICATION REGIMEN: ICD-10-CM

## 2021-09-24 DIAGNOSIS — Z91.018 ALLERGY TO OTHER FOODS: ICD-10-CM

## 2021-09-24 DIAGNOSIS — F25.0 SCHIZOAFFECTIVE DISORDER, BIPOLAR TYPE: ICD-10-CM

## 2021-09-24 DIAGNOSIS — Z56.0 UNEMPLOYMENT, UNSPECIFIED: ICD-10-CM

## 2021-09-24 DIAGNOSIS — F41.9 ANXIETY DISORDER, UNSPECIFIED: ICD-10-CM

## 2021-09-24 DIAGNOSIS — Z95.5 PRESENCE OF CORONARY ANGIOPLASTY IMPLANT AND GRAFT: ICD-10-CM

## 2021-09-24 DIAGNOSIS — Z88.6 ALLERGY STATUS TO ANALGESIC AGENT: ICD-10-CM

## 2021-09-24 DIAGNOSIS — F10.11 ALCOHOL ABUSE, IN REMISSION: ICD-10-CM

## 2021-09-24 DIAGNOSIS — G89.29 OTHER CHRONIC PAIN: ICD-10-CM

## 2021-09-24 DIAGNOSIS — I11.9 HYPERTENSIVE HEART DISEASE WITHOUT HEART FAILURE: ICD-10-CM

## 2021-09-24 DIAGNOSIS — I25.10 ATHEROSCLEROTIC HEART DISEASE OF NATIVE CORONARY ARTERY WITHOUT ANGINA PECTORIS: ICD-10-CM

## 2021-09-24 DIAGNOSIS — R73.9 HYPERGLYCEMIA, UNSPECIFIED: ICD-10-CM

## 2021-09-24 DIAGNOSIS — E78.5 HYPERLIPIDEMIA, UNSPECIFIED: ICD-10-CM

## 2021-09-24 DIAGNOSIS — F43.10 POST-TRAUMATIC STRESS DISORDER, UNSPECIFIED: ICD-10-CM

## 2021-09-24 DIAGNOSIS — F19.20 OTHER PSYCHOACTIVE SUBSTANCE DEPENDENCE, UNCOMPLICATED: ICD-10-CM

## 2021-09-24 DIAGNOSIS — Z91.010 ALLERGY TO PEANUTS: ICD-10-CM

## 2021-09-24 DIAGNOSIS — M54.9 DORSALGIA, UNSPECIFIED: ICD-10-CM

## 2021-09-24 DIAGNOSIS — Z59.0 HOMELESSNESS: ICD-10-CM

## 2021-09-24 SDOH — ECONOMIC STABILITY - HOUSING INSECURITY: HOMELESSNESS: Z59.0

## 2021-09-24 SDOH — ECONOMIC STABILITY - INCOME SECURITY: UNEMPLOYMENT, UNSPECIFIED: Z56.0

## 2021-11-17 ENCOUNTER — APPOINTMENT (OUTPATIENT)
Dept: CARDIOLOGY | Facility: CLINIC | Age: 61
End: 2021-11-17

## 2021-12-01 ENCOUNTER — OUTPATIENT (OUTPATIENT)
Dept: OUTPATIENT SERVICES | Facility: HOSPITAL | Age: 61
LOS: 1 days | End: 2021-12-01
Payer: MEDICAID

## 2021-12-01 DIAGNOSIS — Z87.42 PERSONAL HISTORY OF OTHER DISEASES OF THE FEMALE GENITAL TRACT: Chronic | ICD-10-CM

## 2021-12-01 DIAGNOSIS — K27.5 CHRONIC OR UNSPECIFIED PEPTIC ULCER, SITE UNSPECIFIED, WITH PERFORATION: Chronic | ICD-10-CM

## 2021-12-01 DIAGNOSIS — Z98.890 OTHER SPECIFIED POSTPROCEDURAL STATES: Chronic | ICD-10-CM

## 2021-12-01 DIAGNOSIS — Z98.62 PERIPHERAL VASCULAR ANGIOPLASTY STATUS: Chronic | ICD-10-CM

## 2021-12-01 DIAGNOSIS — Z90.49 ACQUIRED ABSENCE OF OTHER SPECIFIED PARTS OF DIGESTIVE TRACT: Chronic | ICD-10-CM

## 2021-12-01 PROCEDURE — G9005: CPT

## 2021-12-01 NOTE — PATIENT PROFILE ADULT - DOES PATIENT HAVE ADVANCE DIRECTIVE
no As certified below, I, or a nurse practitioner or physician assistant working with me, had a face-to-face encounter that meets the physician face-to-face encounter requirements.

## 2021-12-30 DIAGNOSIS — Z71.89 OTHER SPECIFIED COUNSELING: ICD-10-CM

## 2022-01-18 NOTE — H&P ADULT - NS ABD PE RECTAL EXAM
Patient Education     Established High Blood Pressure    High blood pressure (hypertension) is a long-term (chronic) disease. Often healthcare providers don’t know what causes it. But it can be caused by certain health conditions and medicines.  If you have high blood pressure, you may not have any symptoms. If you do have symptoms, they may include:  · Headache  · Dizziness  · Changes in your vision  · Chest pain  · Shortness of breath  But even without symptoms, high blood pressure that’s not treated raises your risk for heart attack, heart failure, kidney disease, and stroke. High blood pressure is a serious health risk and shouldn’t be ignored.  Blood pressure measurements are given as 2 numbers. Systolic blood pressure is the upper number. This is the pressure when the heart contracts. Diastolic blood pressure is the lower number. This is the pressure when the heart relaxes between beats. You will see your blood pressure readings written together. For example, a person with a systolic pressure of 118 and a diastolic pressure of 78 will have 118/78 written in the medical record.  Blood pressure is classified as normal, raised (elevated) or stage 1 or stage 2 high blood pressure:  · Normal blood pressure. Systolic of less than 120 and diastolic of less than 80 (120/80).  · Elevated blood pressure. Systolic of 120 to 129 and diastolic less than 80.  · Stage 1 high blood pressure. Systolic is 130 to 139 or diastolic between 80 to 89.  · Stage 2 high blood pressure. Systolic is 140 or higher or the diastolic is 90 or higher.  Home care  If you have high blood pressure, follow these home care guidelines to help lower your blood pressure. If you are taking medicines for high blood pressure, these methods may reduce or end your need for medicines in the future.  · Start a weight-loss program if you are overweight.  · Cut back on how much salt you get in your diet. Here’s how to do this:  ? Don’t eat foods that have a  lot of salt. These include olives, pickles, smoked meats, and salted potato chips.  ? Don’t add salt to your food at the table.  ? Use only small amounts of salt when cooking.  · Start an exercise program. Talk with your healthcare provider about the type of exercise program that would be best for you. It doesn't have to be hard. Even brisk walking for 20 minutes 3 times a week is a good form of exercise.  · Don’t take medicines that stimulate the heart. This includes many over-the-counter cold and sinus decongestant pills and sprays, as well as diet pills. Check the warnings about high blood pressure on the label. Before buying any over-the-counter medicines or supplements, always ask the pharmacist about the product's possible interaction with your high blood pressure and your high blood pressure medicines.  · Stimulants such as amphetamine or cocaine could be deadly for someone with high blood pressure. Never take these.  · Limit how much caffeine you get in your diet. Switch to caffeine-free products.  · Stop smoking. If you are a long-time smoker, this can be hard. Talk with your healthcare provider about medicines and nicotine replacement options to help you. Also join a stop-smoking program . This makes it more likely that you will quit for good.  · Learn how to handle stress. This is an important part of any program to lower blood pressure. Learn about relaxation methods such as meditation, yoga, or biofeedback.  · If your provider prescribed medicines, take them exactly as directed. Missing doses may cause your blood pressure get out of control.  · If you miss a dose, check with your healthcare provider or pharmacist about what to do.  · Think about buying an automatic blood pressure machine to check your blood pressure at home. Ask your provider for a recommendation. You can get one of these at most pharmacies.  Using a home blood pressure monitor  The American Heart Association advises the following  guidelines for home blood pressure monitoring:  · Don't smoke or drink coffee for 30 minutes before taking your blood pressure.  · Go to the bathroom before the test.  · Relax for 5 minutes before taking the measurement.  · Sit with your back supported (don't sit on a couch or soft chair). Keep your feet on the floor uncrossed. Place your arm on a solid flat surface (such as a table) with the upper part of the arm at heart level. Place the middle of the cuff directly above the bend of the elbow. Check the monitor's instruction manual for an illustration.  · Take multiple readings. When you measure, take 2 to 3 readings one minute apart and record all of the results.  · Take your blood pressure at the same time every day, or as your healthcare provider advises.  · Record the date, time, and blood pressure reading.  · Take the record with you to your next healthcare appointment. If your blood pressure monitor has a built-in memory, just take the monitor with you to your next appointment.  · Call your provider if you have several high readings. Don't be frightened by one high blood pressure reading. But if you get a few high readings, check in with your healthcare provider.  Follow-up care  You will need to see your healthcare provider regularly. This is to check your blood pressure and to make changes to your medicines. Make a follow-up appointment as directed. Bring the record of your home blood pressure readings to the appointment.  Call 911  Call 911 if you have any of these:  · Blood pressure of 180/120 or higher  · Chest pain or shortness of breath  · Weakness of an arm or leg or one side of the face  · Problems speaking or seeing     When to get medical advice  Call your healthcare provider right away if any of these occur:  · Severe headache  · Throbbing or rushing sound in the ears  · Nosebleed  · Sudden severe pain in your belly (abdomen)  · Extreme drowsiness, confusion, or fainting  · Dizziness or spinning  feeling (vertigo)  [place] last reviewed this educational content on 7/1/2019  © 4228-0284 [employer]. All rights reserved. This information is not intended as a substitute for professional medical care. Always follow your healthcare professional's instructions.           Patient Education     Diabetes: Activity Tips    Being more active can help you manage your diabetes. The tips on this sheet can help you get the most from your exercise. They can also help you stay safe.   Staying active   It’s important for adults to spend less time sitting and being inactive. This is especially true if you have type 2 diabetes. When you are sitting for long periods of time, get up for short sessions of light activity every 30 minutes.   Aim for at least 150 minutes a week of exercise or physical activity. That's about 30 minutes a day 5 to 7 days a week. Don’t let more than 2 days go by without being active. Break up your daily activity into 10-minute blocks. Or choose a daily schedule that works for you. Make your goal 30 minutes of daily physical activity.   Benefit from briskness  Brisk activity gets your heart beating faster. This can help make you more fit, lose extra weight, and manage your blood sugar level. Try brisk walking. Or try swimming or bike riding if you have foot or leg problems. You can break up your exercise into chunks throughout the day. Work up to at least 30 minutes of steady, brisk exercise on most days.   Warm up and cool down  Warming up and cooling down reduce your risk for injury. This also helps limit muscle soreness. Do a mild version of your activity for 5 minutes before and after your routine. You can also learn stretches that will help keep your muscles loose. Your healthcare provider may show you good ways to warm up and stretch.   Do the talk-sing test  The talk-sing test is a simple way to tell how hard you’re exercising. If you can talk while exercising, you’re in a safe  range. If you’re out of breath, slow down. If you can carry a tune, it’s time to  the pace. Walk up a hill. Add more resistance on your stationary bike. Or swim faster.   What about eating?  You may be told to plan your exercise for 1 to 2 hours after a meal. In most cases, you don’t need to eat while being active. Test your blood sugar before exercising if you take insulin or medicine that can cause low blood sugar. And carry a fast-acting sugar that will raise your blood sugar level quickly. This includes glucose tablets or glucose gel in a tube. Use it if you feel low blood sugar symptoms.   Safety tips  These tips can help you stay safe as you become fit:  · Exercise with a friend or carry a cell phone if you have one.  · Carry or wear ID such as a necklace or bracelet that says you have diabetes.  · Use the correct footwear and safety equipment for your activity.  · Drink water before, during, and after exercise.  · Dress for the weather.  · Don’t exercise in very hot or very cold weather.  · Don’t exercise if you are sick.  · Test your blood sugar before and after you exercise if you are told to do so. Have a small carbohydrate snack if your blood sugar is low before you start exercising.   When to stop exercising and call your healthcare provider  Stop exercising and call your healthcare provider right away if you notice any of the following:   · Pain, pressure, tightness, or heaviness in the chest  · Pain or heaviness in the neck, shoulders, back, arms, legs, or feet  · Unusual shortness of breath  · Dizziness or lightheadedness  · Unusually rapid or slow pulse  · Increased joint or muscle pain  · Nausea or vomiting  David last reviewed this educational content on 11/1/2018 © 2000-2020 [employer]. [address] [place] [address], [place] [address]. All rights reserved. This information is not intended as a substitute for professional medical care. Always follow your healthcare professional's  instructions.           Patient Education     Controlling Your Cholesterol  Cholesterol is a waxy substance. It travels in your blood through the blood vessels. When you have high cholesterol, it can build up along the walls of the blood vessels. This makes the vessels narrower and decreases blood flow. You are then at greater risk of having a heart attack or a stroke.  Good and bad cholesterol  Lipids are fats, and blood is mostly water. Fat and water don't mix. So our bodies need lipoproteins (lipids inside a protein shell) to carry the lipids. The protein shell carries its lipids through the bloodstream. There are two main kinds of lipoproteins:  · LDL (low-density lipoprotein) is known as \"bad cholesterol.\" It mainly carries cholesterol. It delivers this cholesterol to body cells. Excess LDL cholesterol will build up in artery walls. This increases your risk for heart disease and stroke.  · HDL (high-density lipoprotein) is known as \"good cholesterol.\" This protein shell collects excess cholesterol that LDLs have left behind on blood vessel walls. That's why high levels of HDL cholesterol can decrease your risk of heart disease and stroke.  Controlling cholesterol levels  Total cholesterol includes LDL and HDL cholesterol, as well as other fats in the bloodstream. If your total cholesterol is high, follow the steps below to help lower your total cholesterol level:  Eat less unhealthy fat  · Cut back on saturated fats and trans fats (also called hydrogenated) by selecting lean cuts of meat, low-fat dairy, and using oils instead of solid fats. Limit baked goods, processed meats, and fried foods. A diet that’s high in these fats increases your bad cholesterol. It's not enough to just cut back on foods containing cholesterol.  · Eat about two, 3.5 ounce servings of non-fried fish such as salmon, herring, sardines or mackerel per week . Most fish contain omega-3 fatty acids. These help lower total blood cholesterol.  Omega-3 fatty acids lowers triglyceride levels, another form of fat in the blood. If you are pregnant or thinking of becoming pregnant or are breastfeeding, talk with your healthcare provider for advice about the best fish choices and how much to eat.  · Eat more whole grains and soluble fiber (such as oat bran). These lower overall cholesterol.  Be active  · Choose an activity you enjoy. Walking, swimming, and riding a bike are some good ways to be active.  · Start at a level where you feel comfortable. Increase your time and pace a little each week.  · Work up to 30 to 40 minutes of moderate to high intensity physical activity at least 3 to 4 days per week.  · Remember, some activity is better than none.  · If you haven't been exercising regularly, start slowly. Check with your healthcare provider to make sure the exercise plan is right for you.  Quit smoking  Quitting smoking can improve your lipid levels. It also lowers your risk for heart disease and stroke.  Manage your weight  If you are overweight or obese, your healthcare provider will work with you to lose weight and lower your BMI (body mass index) to a normal or near-normal level. Making diet changes and increasing physical activity can help.  Take medicine as directed  Many people need medicine to get their LDL levels to a safe level. Medicine to lower cholesterol levels is effective and safe. Taking medicine is not a substitute for exercise or watching your diet! Your healthcare provider can tell you whether you might benefit from a cholesterol-lowering medicine.  David last reviewed this educational content on 6/1/2019 © 2000-2020 [employer]. [address] [place] [address], [place] [address]. All rights reserved. This information is not intended as a substitute for professional medical care. Always follow your healthcare professional's instructions.              not examined

## 2022-07-07 ENCOUNTER — RESULT REVIEW (OUTPATIENT)
Age: 62
End: 2022-07-07

## 2022-09-26 ENCOUNTER — INPATIENT (INPATIENT)
Facility: HOSPITAL | Age: 62
LOS: 0 days | Discharge: HOME | End: 2022-09-27
Attending: INTERNAL MEDICINE | Admitting: INTERNAL MEDICINE

## 2022-09-26 VITALS
RESPIRATION RATE: 18 BRPM | TEMPERATURE: 98 F | SYSTOLIC BLOOD PRESSURE: 173 MMHG | HEART RATE: 61 BPM | DIASTOLIC BLOOD PRESSURE: 88 MMHG | OXYGEN SATURATION: 98 %

## 2022-09-26 DIAGNOSIS — Z87.42 PERSONAL HISTORY OF OTHER DISEASES OF THE FEMALE GENITAL TRACT: Chronic | ICD-10-CM

## 2022-09-26 DIAGNOSIS — Z98.62 PERIPHERAL VASCULAR ANGIOPLASTY STATUS: Chronic | ICD-10-CM

## 2022-09-26 DIAGNOSIS — D21.9 BENIGN NEOPLASM OF CONNECTIVE AND OTHER SOFT TISSUE, UNSPECIFIED: Chronic | ICD-10-CM

## 2022-09-26 DIAGNOSIS — Z90.49 ACQUIRED ABSENCE OF OTHER SPECIFIED PARTS OF DIGESTIVE TRACT: Chronic | ICD-10-CM

## 2022-09-26 DIAGNOSIS — I21.4 NON-ST ELEVATION (NSTEMI) MYOCARDIAL INFARCTION: ICD-10-CM

## 2022-09-26 DIAGNOSIS — K27.5 CHRONIC OR UNSPECIFIED PEPTIC ULCER, SITE UNSPECIFIED, WITH PERFORATION: Chronic | ICD-10-CM

## 2022-09-26 DIAGNOSIS — Z98.890 OTHER SPECIFIED POSTPROCEDURAL STATES: Chronic | ICD-10-CM

## 2022-09-26 LAB — SARS-COV-2 RNA SPEC QL NAA+PROBE: SIGNIFICANT CHANGE UP

## 2022-09-26 PROCEDURE — 93454 CORONARY ARTERY ANGIO S&I: CPT | Mod: 26

## 2022-09-26 PROCEDURE — 93010 ELECTROCARDIOGRAM REPORT: CPT

## 2022-09-26 RX ORDER — POLYETHYLENE GLYCOL 3350 17 G/17G
17 POWDER, FOR SOLUTION ORAL AT BEDTIME
Refills: 0 | Status: DISCONTINUED | OUTPATIENT
Start: 2022-09-26 | End: 2022-09-27

## 2022-09-26 RX ORDER — SODIUM CHLORIDE 9 MG/ML
3 INJECTION INTRAMUSCULAR; INTRAVENOUS; SUBCUTANEOUS EVERY 8 HOURS
Refills: 0 | Status: DISCONTINUED | OUTPATIENT
Start: 2022-09-26 | End: 2022-09-27

## 2022-09-26 RX ORDER — METOPROLOL TARTRATE 50 MG
12.5 TABLET ORAL
Refills: 0 | Status: DISCONTINUED | OUTPATIENT
Start: 2022-09-26 | End: 2022-09-27

## 2022-09-26 RX ORDER — SENNA PLUS 8.6 MG/1
2 TABLET ORAL AT BEDTIME
Refills: 0 | Status: DISCONTINUED | OUTPATIENT
Start: 2022-09-26 | End: 2022-09-27

## 2022-09-26 RX ORDER — LANOLIN ALCOHOL/MO/W.PET/CERES
6 CREAM (GRAM) TOPICAL AT BEDTIME
Refills: 0 | Status: DISCONTINUED | OUTPATIENT
Start: 2022-09-26 | End: 2022-09-27

## 2022-09-26 RX ORDER — PANTOPRAZOLE SODIUM 20 MG/1
40 TABLET, DELAYED RELEASE ORAL
Refills: 0 | Status: DISCONTINUED | OUTPATIENT
Start: 2022-09-26 | End: 2022-09-27

## 2022-09-26 RX ORDER — INFLUENZA VIRUS VACCINE 15; 15; 15; 15 UG/.5ML; UG/.5ML; UG/.5ML; UG/.5ML
0.5 SUSPENSION INTRAMUSCULAR ONCE
Refills: 0 | Status: DISCONTINUED | OUTPATIENT
Start: 2022-09-26 | End: 2022-09-27

## 2022-09-26 RX ORDER — ASPIRIN/CALCIUM CARB/MAGNESIUM 324 MG
81 TABLET ORAL DAILY
Refills: 0 | Status: DISCONTINUED | OUTPATIENT
Start: 2022-09-27 | End: 2022-09-27

## 2022-09-26 RX ORDER — ATORVASTATIN CALCIUM 80 MG/1
80 TABLET, FILM COATED ORAL AT BEDTIME
Refills: 0 | Status: DISCONTINUED | OUTPATIENT
Start: 2022-09-26 | End: 2022-09-27

## 2022-09-26 RX ORDER — ACETAMINOPHEN 500 MG
650 TABLET ORAL ONCE
Refills: 0 | Status: COMPLETED | OUTPATIENT
Start: 2022-09-26 | End: 2022-09-26

## 2022-09-26 RX ORDER — NICOTINE POLACRILEX 2 MG
1 GUM BUCCAL DAILY
Refills: 0 | Status: DISCONTINUED | OUTPATIENT
Start: 2022-09-26 | End: 2022-09-27

## 2022-09-26 RX ADMIN — SODIUM CHLORIDE 3 MILLILITER(S): 9 INJECTION INTRAMUSCULAR; INTRAVENOUS; SUBCUTANEOUS at 18:17

## 2022-09-26 RX ADMIN — PANTOPRAZOLE SODIUM 40 MILLIGRAM(S): 20 TABLET, DELAYED RELEASE ORAL at 18:31

## 2022-09-26 RX ADMIN — Medication 12.5 MILLIGRAM(S): at 18:31

## 2022-09-26 RX ADMIN — ATORVASTATIN CALCIUM 80 MILLIGRAM(S): 80 TABLET, FILM COATED ORAL at 22:02

## 2022-09-26 RX ADMIN — POLYETHYLENE GLYCOL 3350 17 GRAM(S): 17 POWDER, FOR SOLUTION ORAL at 22:02

## 2022-09-26 RX ADMIN — SODIUM CHLORIDE 3 MILLILITER(S): 9 INJECTION INTRAMUSCULAR; INTRAVENOUS; SUBCUTANEOUS at 22:00

## 2022-09-26 RX ADMIN — SENNA PLUS 2 TABLET(S): 8.6 TABLET ORAL at 22:02

## 2022-09-26 NOTE — H&P CARDIOLOGY - NSICDXPASTMEDICALHX_GEN_ALL_CORE_FT
PAST MEDICAL HISTORY:  Anxiety     Bipolar 1 disorder     CAD (coronary artery disease)     Heart disease     Hyperlipidemia     PTSD (post-traumatic stress disorder)     Secondary hypertension      PAST MEDICAL HISTORY:  Anxiety     Bipolar 1 disorder resolved per patient    CAD (coronary artery disease)     CAD (coronary artery disease)     Heart disease     History of herniated intervertebral disc     Hyperlipidemia     PTSD (post-traumatic stress disorder)     Secondary hypertension

## 2022-09-26 NOTE — H&P CARDIOLOGY - NSICDXFAMILYHX_GEN_ALL_CORE_FT
FAMILY HISTORY:  Family history of breast cancer in mother, Mother  at age 55 of breast cancer  FH: lung cancer, Father  of Lung Cancer @ age 55; Brother currently has lung cancer  FHx: diabetes mellitus, sister     FAMILY HISTORY:  Family history of breast cancer in mother, Mother  at age 55 of breast cancer  FH: lung cancer, Father  of Lung Cancer @ age 55; Brother currently has lung cancer  FHx: diabetes mellitus, sister    Grandparent  Still living? No  FH: heart attack, Age at diagnosis: Age Unknown

## 2022-09-26 NOTE — H&P CARDIOLOGY - COMMENTS
no signs of ETOH withdrawal (drinks 2-3xper week)  daily marijuana for chronic pain - pt admits to being comfortable at this time   tele  NPO for cath  continue nicotine patch started at [facility] as current smoker  monitor BP control

## 2022-09-26 NOTE — H&P CARDIOLOGY - NSICDXPASTSURGICALHX_GEN_ALL_CORE_FT
PAST SURGICAL HISTORY:  H/O angioplasty stents placed in 2007 & 5/2019    History of appendectomy     History of bilateral breast reduction surgery     History of lumpectomy     History of ovarian cyst     Perforated ulcer      PAST SURGICAL HISTORY:  Fibroid laproscopic removal    H/O angioplasty stents placed in 2007 & 5/2019    History of appendectomy     History of bilateral breast reduction surgery     History of lumpectomy     History of ovarian cyst benign    Perforated ulcer treated with emergent surgery

## 2022-09-26 NOTE — PATIENT PROFILE ADULT - VISION (WITH CORRECTIVE LENSES IF THE PATIENT USUALLY WEARS THEM):
DM (diabetes mellitus)    HLD (hyperlipidemia)    HTN (hypertension)
Normal vision: sees adequately in most situations; can see medication labels, newsprint

## 2022-09-26 NOTE — H&P CARDIOLOGY - HISTORY OF PRESENT ILLNESS
61 year old male with HTN, hyperlipidemia, anxiety/bipolar, PTSD who presented to [facility] ED complaining of     In light of patients cardiac risk factors, symptoms and abnormal noninvasive test findings there is high suspicion for CAD. Patient is now referred to [facility] for a cardiac catheterization with possible PTCA/stent.     Referring MD:     Denies fever, cough, chills, headache, flu like symptoms, sick contact or recent travel  COVID PCR ____ on ____ 61 year old male with HTN, hyperlipidemia, chronic pain/anxiety/bipolar - takes medical marijuana, PTSD, known CAD with PTCA/stent 2005/2019 at [facility] (patent stents LAD and RCA mild-moderate spasm of mid and distal RCA around the old stent, this improved with NTG) at who presented to [facility] ED 9/23/22 complaining of chest pain and SOB for the past 1 week. Admits to an increase in fatigue and decrease in exercise tolerance.  Patient describes symptoms as chest pain radiating to the neck and SOB with associated nausea and vomiting that increased in frequency and intensity over the week prior to admission. When overnight her symptoms increased, she activated EMS and was BIBA to [facility].  Denies syncope, edema, orthopnea, PND.   R/I NSTEMI, peak 0.016, started on ASA and Plavix therapy.   In light of patients known CAD history, symptoms and abnormal noninvasive test findings there is high suspicion for CAD progression. Patient is now transferred to [facility] 9/26/22 for a cardiac catheterization with possible PTCA/stent.   Patient explains that she follows with her PMD, has been unable to get an appt with a cardiologist due to multiple factors that she does not offer details. Patient explains that she does take her medications daily as prescribed, was stopped on her Aspirin therapy 2months ago by her PMD for history of perforated ulcer, denies any recent GI issue and no recent melena, BRBPR. Explains that she was prescribed Brilinta therapy in 2019 for which she explains she did not tolerated, "I was very sick and short of breath.     Denies fever, cough, chills, headache, flu like symptoms, sick contact or recent travel  COVID PCR ____ on ____ 61 year old female, current smoker, ETOH use 2-3times per week, daily medical marijuana for chronic pain/anxiety, Bipolar disorder, PTSD, known CAD with PTCA/stent 2005/2019 at [facility] (patent stents LAD and RCA mild-moderate spasm of mid and distal RCA around the old stent, this improved with NTG) at who presented to [facility] ED 9/23/22 complaining of chest pain and SOB for the past 1 week. Admits to an increase in fatigue and decrease in exercise tolerance.  Patient describes symptoms as chest pain radiating to the neck and SOB with associated nausea and vomiting that increased in frequency and intensity over the week prior to admission. When overnight her symptoms increased and she noted a blood pressure of 190/90, she activated EMS and was BIBA to [facility].  Denies syncope, edema, orthopnea, PND.   R/I NSTEMI, peak 0.016, started on ASA and Plavix therapy.   In light of patients known CAD history, symptoms and abnormal noninvasive test findings there is high suspicion for CAD progression. Patient is now transferred to [facility] 9/26/22 for a cardiac catheterization with possible PTCA/stent.   Patient explains that she follows with her PMD, has been unable to get an appt with a cardiologist due to multiple factors that she does not offer details. Patient explains that she does take her medications daily as prescribed, was stopped on her Aspirin therapy 2months ago by her PMD for history of perforated ulcer, denies any recent GI issue and no recent melena, BRBPR. Explains that she was prescribed Brilinta therapy in 2019 for which she explains she did not tolerated, "I was very sick and short of breath.   OF NOTE: Patient advised for need for strict daily compliance with long term dual antiplatelet therapy if coronary stent placement needed. If not risks not limited to MI/death.  Patient verbalized understanding and agrees to take if needed  MEDS at [facility]: ASA 81mg po daily, Plavix 75mg po daily, Colace 100mg po BID, Lovenox 40mg subcutan daily, Famotidine 20mg po daily, Metoprolol 12.5mg po BID, Nicotine 21mg patch, NTG 0.2mg patch, Crestor 20mg po daily, Senna daily    Denies fever, cough, chills, headache, flu like symptoms, sick contact or recent travel  COVID PCR not detected on 9/23/2022

## 2022-09-27 ENCOUNTER — TRANSCRIPTION ENCOUNTER (OUTPATIENT)
Age: 62
End: 2022-09-27

## 2022-09-27 VITALS
SYSTOLIC BLOOD PRESSURE: 143 MMHG | TEMPERATURE: 98 F | HEART RATE: 70 BPM | RESPIRATION RATE: 18 BRPM | DIASTOLIC BLOOD PRESSURE: 70 MMHG | OXYGEN SATURATION: 98 %

## 2022-09-27 LAB
ANION GAP SERPL CALC-SCNC: 12 MMOL/L — SIGNIFICANT CHANGE UP (ref 7–14)
BUN SERPL-MCNC: 20 MG/DL — SIGNIFICANT CHANGE UP (ref 7–23)
CALCIUM SERPL-MCNC: 9.5 MG/DL — SIGNIFICANT CHANGE UP (ref 8.4–10.5)
CHLORIDE SERPL-SCNC: 105 MMOL/L — SIGNIFICANT CHANGE UP (ref 98–107)
CO2 SERPL-SCNC: 24 MMOL/L — SIGNIFICANT CHANGE UP (ref 22–31)
CREAT SERPL-MCNC: 1.04 MG/DL — SIGNIFICANT CHANGE UP (ref 0.5–1.3)
EGFR: 62 ML/MIN/1.73M2 — SIGNIFICANT CHANGE UP
GLUCOSE SERPL-MCNC: 94 MG/DL — SIGNIFICANT CHANGE UP (ref 70–99)
HCT VFR BLD CALC: 46.2 % — HIGH (ref 34.5–45)
HGB BLD-MCNC: 15.4 G/DL — SIGNIFICANT CHANGE UP (ref 11.5–15.5)
MCHC RBC-ENTMCNC: 32.8 PG — SIGNIFICANT CHANGE UP (ref 27–34)
MCHC RBC-ENTMCNC: 33.3 GM/DL — SIGNIFICANT CHANGE UP (ref 32–36)
MCV RBC AUTO: 98.5 FL — SIGNIFICANT CHANGE UP (ref 80–100)
NRBC # BLD: 0 /100 WBCS — SIGNIFICANT CHANGE UP (ref 0–0)
NRBC # FLD: 0 K/UL — SIGNIFICANT CHANGE UP (ref 0–0)
PLATELET # BLD AUTO: 102 K/UL — LOW (ref 150–400)
POTASSIUM SERPL-MCNC: 4.2 MMOL/L — SIGNIFICANT CHANGE UP (ref 3.5–5.3)
POTASSIUM SERPL-SCNC: 4.2 MMOL/L — SIGNIFICANT CHANGE UP (ref 3.5–5.3)
RBC # BLD: 4.69 M/UL — SIGNIFICANT CHANGE UP (ref 3.8–5.2)
RBC # FLD: 12.8 % — SIGNIFICANT CHANGE UP (ref 10.3–14.5)
SODIUM SERPL-SCNC: 141 MMOL/L — SIGNIFICANT CHANGE UP (ref 135–145)
WBC # BLD: 7.81 K/UL — SIGNIFICANT CHANGE UP (ref 3.8–10.5)
WBC # FLD AUTO: 7.81 K/UL — SIGNIFICANT CHANGE UP (ref 3.8–10.5)

## 2022-09-27 RX ORDER — LISINOPRIL 2.5 MG/1
1 TABLET ORAL
Qty: 30 | Refills: 0
Start: 2022-09-27 | End: 2022-10-26

## 2022-09-27 RX ORDER — PANTOPRAZOLE SODIUM 20 MG/1
1 TABLET, DELAYED RELEASE ORAL
Qty: 30 | Refills: 0
Start: 2022-09-27 | End: 2022-10-26

## 2022-09-27 RX ORDER — ASPIRIN/CALCIUM CARB/MAGNESIUM 324 MG
1 TABLET ORAL
Qty: 30 | Refills: 0
Start: 2022-09-27 | End: 2022-10-26

## 2022-09-27 RX ORDER — METOPROLOL TARTRATE 50 MG
1 TABLET ORAL
Qty: 30 | Refills: 0
Start: 2022-09-27 | End: 2022-10-26

## 2022-09-27 RX ORDER — ATORVASTATIN CALCIUM 80 MG/1
1 TABLET, FILM COATED ORAL
Qty: 30 | Refills: 0
Start: 2022-09-27 | End: 2022-10-26

## 2022-09-27 RX ORDER — LISINOPRIL 2.5 MG/1
1 TABLET ORAL
Qty: 0 | Refills: 0 | DISCHARGE

## 2022-09-27 RX ADMIN — PANTOPRAZOLE SODIUM 40 MILLIGRAM(S): 20 TABLET, DELAYED RELEASE ORAL at 06:08

## 2022-09-27 RX ADMIN — Medication 650 MILLIGRAM(S): at 01:00

## 2022-09-27 RX ADMIN — Medication 81 MILLIGRAM(S): at 11:41

## 2022-09-27 RX ADMIN — SODIUM CHLORIDE 3 MILLILITER(S): 9 INJECTION INTRAMUSCULAR; INTRAVENOUS; SUBCUTANEOUS at 06:11

## 2022-09-27 RX ADMIN — Medication 6 MILLIGRAM(S): at 00:45

## 2022-09-27 RX ADMIN — Medication 650 MILLIGRAM(S): at 00:45

## 2022-09-27 RX ADMIN — Medication 12.5 MILLIGRAM(S): at 06:07

## 2022-09-27 RX ADMIN — Medication 1 PATCH: at 11:41

## 2022-09-27 NOTE — DISCHARGE NOTE NURSING/CASE MANAGEMENT/SOCIAL WORK - PATIENT PORTAL LINK FT
You can access the FollowMyHealth Patient Portal offered by [facility] by registering at the following website: [email or web address]. By joining [employer]’s FollowMyHealth portal, you will also be able to view your health information using other applications (apps) compatible with our system.

## 2022-09-27 NOTE — DISCHARGE NOTE NURSING/CASE MANAGEMENT/SOCIAL WORK - NSDCPEEMAIL_GEN_ALL_CORE
[facility] for Tobacco Control email [email or web address]

## 2022-09-27 NOTE — DISCHARGE NOTE PROVIDER - CARE PROVIDER_API CALL
Sushila Okeefe)  Cardiology; Internal Medicine  [address] [place] [address], [address]  [place], [place] [id number]  Phone: [phone]  Fax: [phone]  Follow Up Time:

## 2022-09-27 NOTE — DISCHARGE NOTE NURSING/CASE MANAGEMENT/SOCIAL WORK - NSDCPEWEB_GEN_ALL_CORE
[facility] for Tobacco Control website --- [email or web address] website --- [email or web address]

## 2022-09-27 NOTE — DISCHARGE NOTE PROVIDER - NSFOLLOWUPCLINICS_GEN_ALL_ED_FT
[facility]  Cardiology  [address] [place] [address]  Phone: [phone]  Fax:

## 2022-09-27 NOTE — DISCHARGE NOTE PROVIDER - NSDCCPCAREPLAN_GEN_ALL_CORE_FT
PRINCIPAL DISCHARGE DIAGNOSIS  Diagnosis: CAD (coronary artery disease)  Assessment and Plan of Treatment: You were transfered here for a cath procedure which did not show any progression of your coronary artery disease. We did change your medicaitons while you were inpatient and should be taking : metoprolol 25mg once a day, lisinopril 20 mg daily, atorvastatin 80mg at night, and aspirin. We stopped your PLAVIX medication while here. Continue to follow up with cardiology outpatient in 2-3 weeks.      SECONDARY DISCHARGE DIAGNOSES  Diagnosis: Hypertension  Assessment and Plan of Treatment: Continue blood pressure medication regimen as directed. Monitor for any visual changes, headaches or dizziness.  Monitor blood pressure regularly.  Follow up with your primary care provider for further management for high blood pressure.      Diagnosis: GERD (gastroesophageal reflux disease)  Assessment and Plan of Treatment: During your stay we started you on a medication called pantoprazole, this will help your chest pain. Please follow up with your primary care doctor for further monitoring.    Diagnosis: Tobacco dependence  Assessment and Plan of Treatment: Please talk to your PCP about smoking cessation medications/therapies. Smoking tobacco products increases your risks of coronary artery disease and strokes.

## 2022-09-27 NOTE — PROGRESS NOTE ADULT - ASSESSMENT
This is a 60yo female with PMHx of CAD s/p PCI to LAD and RCA, active tobacco use, HTN, HLD, Bipolar disorder, who presented as a transfer from [facility] for chest pain, NSTEMI. She presented with one week of chest pain, shortness of breath, fatigue- sxs present at rest and exertion. Troponin peaked at 0.016 at OSH, was started on ASA and Plavix. BP at OSH was 190s/80s.  Underwent [facility] 9/26 via RRA- showed patent stents in LAD, minor luminar irregularities in LM, mild prox RCA ISR. Recommend medical management. Plavix was stopped 9/26.    1. CAD with hx of PCI to LAD and RCA, patent stents on [facility] 9/26  - Continue Aspirin 81mg, Atorvastatin 80m qHS, Metoprolol Tartrate 12.5mg BID   - Lisinopril 20mg   - Encouraged smoking cessation   - Discussed with patient RRA access site precautions  - Follow up with Cardiology 2-3 weeks from discharge     2. HTN  - Lisinopril as above    3. GERD  - Continue PPI    4. Tobacco use  - Smoking cessation     No further Cardiac workup needed inpatient.       Suresh Page MD  Cardiology Fellow - PGY 5     This is a 62yo female with PMHx of CAD s/p PCI to LAD and RCA, active tobacco use, HTN, HLD, Bipolar disorder, who presented as a transfer from [facility] for chest pain, NSTEMI. She presented with one week of chest pain, shortness of breath, fatigue- sxs present at rest and exertion. Troponin peaked at 0.016 at OSH, was started on ASA and Plavix. BP at OSH was 190s/80s.  Underwent [facility] 9/26 via RRA- showed patent stents in LAD, minor luminar irregularities in LM, mild prox RCA ISR. Recommend medical management. Plavix was stopped 9/26.    1. CAD with hx of PCI to LAD and RCA, patent stents on [facility] 9/26  - Continue Aspirin 81mg, Atorvastatin 80m qHS, Metoprolol Tartrate 12.5mg BID   - Increase Lisinopril to 20mg daily  - Encouraged smoking cessation   - Discussed with patient RRA access site precautions  - Follow up with Cardiology 2-3 weeks from discharge     2. HTN  - Lisinopril as above    3. GERD  - Continue PPI    4. Tobacco use  - Smoking cessation     No further Cardiac workup needed inpatient. Ok to discharge from a Cardiac standpoint.       Suresh Page MD  Cardiology Fellow - PGY 5     This is a 60yo female with PMHx of CAD s/p PCI to LAD and RCA, active tobacco use, HTN, HLD, Bipolar disorder, who presented as a transfer from [facility] for chest pain, NSTEMI. She presented with one week of chest pain, shortness of breath, fatigue- sxs present at rest and exertion. Troponin peaked at 0.016 at OSH, was started on ASA and Plavix. BP at OSH was 190s/80s.  Underwent [facility] 9/26 via RRA- moderate ISR of mid RCA and distal LAD (small caliber). Patent distal RCA/RPLD stent. Recommend medical management and smoking cessation.    1. CAD with hx of PCI to LAD and RCA, patent stents on [facility] 9/26  - Continue Aspirin 81mg, Atorvastatin 80m qHS, Metoprolol Tartrate 12.5mg BID   - Increase Lisinopril to 20mg daily  - Encouraged smoking cessation   - Discussed with patient RRA access site precautions  - Follow up with Cardiology 2-3 weeks from discharge     2. HTN  - Lisinopril as above    3. GERD  - Continue PPI    4. Tobacco use  - Smoking cessation     No further Cardiac workup needed inpatient. Ok to discharge from a Cardiac standpoint.       Suresh Page MD  Cardiology Fellow - PGY 5     This is a 62yo female with PMHx of CAD s/p PCI to LAD and RCA, active tobacco use, HTN, HLD, Bipolar disorder, who presented as a transfer from [facility] for chest pain, NSTEMI. She presented with one week of chest pain, shortness of breath, fatigue- sxs present at rest and exertion. Troponin peaked at 0.016 at OSH, was started on ASA and Plavix. BP at OSH was 190s/80s.  Underwent [facility] 9/26 via RRA- moderate ISR of mid RCA and distal LAD (small caliber). Patent distal RCA/RPLD stent. Recommend medical management and smoking cessation.    1. Atypical Chest Pain  2. CAD with hx of PCI to LAD and RCA, patent stents on [facility] 9/26  - Continue Aspirin 81mg, Atorvastatin 80m qHS  - Change Metoprolol Tartrate 12.5mg BID to Metoprolol Succinate 25mg daily   - Increase Lisinopril to 20mg daily  - Encouraged smoking cessation   - Discussed with patient RRA access site precautions  - Follow up with Cardiology 2-3 weeks from discharge     2. HTN  - Lisinopril as above    3. GERD  - Continue PPI    4. Tobacco use  - Smoking cessation     No further Cardiac workup needed inpatient. Ok to discharge from a Cardiac standpoint.       Suresh Page MD  Cardiology Fellow - PGY 5

## 2022-09-27 NOTE — PROGRESS NOTE ADULT - SUBJECTIVE AND OBJECTIVE BOX
Patient seen and examined at bedside.    Overnight Events: No acute events.     Review Of Systems: No chest pain, shortness of breath, or palpitations            Current Meds:  aspirin enteric coated 81 milliGRAM(s) Oral daily  atorvastatin 80 milliGRAM(s) Oral at bedtime  influenza   Vaccine 0.5 milliLiter(s) IntraMuscular once  melatonin 6 milliGRAM(s) Oral at bedtime  metoprolol tartrate 12.5 milliGRAM(s) Oral two times a day  nicotine - 21 mG/24Hr(s) Patch 1 Patch Transdermal daily  pantoprazole    Tablet 40 milliGRAM(s) Oral before breakfast  polyethylene glycol 3350 17 Gram(s) Oral at bedtime  senna 2 Tablet(s) Oral at bedtime  sodium chloride 0.9% lock flush 3 milliLiter(s) IV Push every 8 hours      Vitals:  T(F): 97.7 (09-27), Max: 98.3 (09-26)  HR: 61 (09-27) (59 - 80)  BP: 155/74 (09-27) (151/81 - 173/88)  RR: 17 (09-27)  SpO2: 97% (09-27)  I&O's Summary      Physical Exam:  Appearance: No acute distress; well appearing  Eyes: EOMI, pink conjunctiva  HEENT: Normal oral mucosa  Cardiovascular: RRR, S1, S2, no murmurs, rubs, or gallops; no edema; no JVD  Respiratory: Clear to auscultation bilaterally  Gastrointestinal: soft, non-tender, non-distended with normal bowel sounds  Musculoskeletal: No clubbing; no joint deformity   Neurologic: Non-focal  Psychiatry: AAOx3, mood & affect appropriate  Skin: No rashes, ecchymoses, or cyanosis      09-27    141  |  105  |  20  ----------------------------<  94  4.2   |  24  |  1.04    Ca    9.5      27 Sep 2022 05:53     Patient seen and examined at bedside.    Overnight Events: No acute events. Seen this morning, she feels well. Plans to use nicotine gum to help aid with smoking cessation.     Review Of Systems: No chest pain, shortness of breath, or palpitations            Current Meds:  aspirin enteric coated 81 milliGRAM(s) Oral daily  atorvastatin 80 milliGRAM(s) Oral at bedtime  influenza   Vaccine 0.5 milliLiter(s) IntraMuscular once  melatonin 6 milliGRAM(s) Oral at bedtime  metoprolol tartrate 12.5 milliGRAM(s) Oral two times a day  nicotine - 21 mG/24Hr(s) Patch 1 Patch Transdermal daily  pantoprazole    Tablet 40 milliGRAM(s) Oral before breakfast  polyethylene glycol 3350 17 Gram(s) Oral at bedtime  senna 2 Tablet(s) Oral at bedtime  sodium chloride 0.9% lock flush 3 milliLiter(s) IV Push every 8 hours      Vitals:  T(F): 97.7 (09-27), Max: 98.3 (09-26)  HR: 61 (09-27) (59 - 80)  BP: 155/74 (09-27) (151/81 - 173/88)  RR: 17 (09-27)  SpO2: 97% (09-27)  I&O's Summary      Physical Exam:  Appearance: No acute distress; well appearing  Eyes: EOMI, pink conjunctiva  HEENT: Normal oral mucosa  Cardiovascular: RRR, S1, S2, no murmurs, rubs, or gallops; no edema; no JVD  Respiratory: Clear to auscultation bilaterally  Gastrointestinal: soft, non-tender, non-distended with normal bowel sounds  Musculoskeletal: No clubbing; no joint deformity. RRA access site with 2+ pulse, no hematoma, small bruise   Neurologic: Non-focal  Psychiatry: AAOx3, mood & affect appropriate  Skin: No rashes, ecchymoses, or cyanosis      09-27    141  |  105  |  20  ----------------------------<  94  4.2   |  24  |  1.04    Ca    9.5      27 Sep 2022 05:53

## 2022-09-27 NOTE — CHART NOTE - NSCHARTNOTEFT_GEN_A_CORE
ACP MEDICINE NIGHT COVERAGE    Patient seen at bedside status post cath via RRA. Site clean, dry and intact. No bleeding, underlying hematoma, or erythema. Patient denies chest pain, SOB, numbness/weakness/tingling. Pulses present, capillary refill appropriate. Patient educated and understands if any of the above symptoms were to arise, to notify RN. Will continue to monitor closely.    T(C): 36.8 (09-26-22 @ 20:12), Max: 36.8 (09-26-22 @ 18:13)  HR: 59 (09-26-22 @ 20:12) (59 - 61)  BP: 151/81 (09-26-22 @ 20:12) (151/81 - 173/88)  RR: 18 (09-26-22 @ 20:12) (18 - 18)  SpO2: 99% (09-26-22 @ 20:12) (98% - 99%)    Gurvinder Wilhelm PA-C  [facility]  [id number]
s/p diagnostic cardiac catheterization, discussed with Dr AWA Stover, continue ASA, d/c Plavix and start PPI

## 2022-11-09 NOTE — PROGRESS NOTE BEHAVIORAL HEALTH - THOUGHT PROCESS
Overinclusive/Tangential/Perseverative/Disorganized
Overinclusive/Tangential/Perseverative/Disorganized
No
Overinclusive/Tangential/Perseverative/Disorganized

## 2022-12-02 ENCOUNTER — APPOINTMENT (OUTPATIENT)
Dept: VASCULAR SURGERY | Facility: CLINIC | Age: 62
End: 2022-12-02

## 2022-12-02 ENCOUNTER — NON-APPOINTMENT (OUTPATIENT)
Age: 62
End: 2022-12-02

## 2022-12-02 VITALS
SYSTOLIC BLOOD PRESSURE: 166 MMHG | WEIGHT: 156 LBS | HEART RATE: 66 BPM | BODY MASS INDEX: 26.63 KG/M2 | HEIGHT: 64 IN | TEMPERATURE: 98 F | DIASTOLIC BLOOD PRESSURE: 83 MMHG

## 2022-12-02 PROCEDURE — 99204 OFFICE O/P NEW MOD 45 MIN: CPT

## 2022-12-02 PROCEDURE — 93880 EXTRACRANIAL BILAT STUDY: CPT

## 2022-12-12 ENCOUNTER — APPOINTMENT (OUTPATIENT)
Dept: CT IMAGING | Facility: IMAGING CENTER | Age: 62
End: 2022-12-12

## 2022-12-14 ENCOUNTER — APPOINTMENT (OUTPATIENT)
Dept: VASCULAR SURGERY | Facility: CLINIC | Age: 62
End: 2022-12-14

## 2022-12-14 PROCEDURE — 99441: CPT

## 2022-12-16 NOTE — HISTORY OF PRESENT ILLNESS
[Home] : at home, [unfilled] , at the time of the visit. [Medical Office: ([facility])___] : at the medical office located in  [Verbal consent obtained from patient] : the patient, [unfilled] [FreeTextEntry1] : ARABELLA BOWDEN (: Dec 31 1960) is a 61 year old female with carotid artery disease. \par \par No prior history of stroke or TIA. No focal neurologic deficits including amaurosis fugax, slurred speech, and weakness in the arms/legs. She is on aspirin 81 mg and rosuvastatin. \par \par PMH: HTN, HLD, CAD s/p multiple cardiac stents, occasional KOEHLER, current every day smoker (4-5 cigarettes, trying to quit)\par \par Carotid duplex - R ICA (361/157), L ICA (199/74), bilaterally antegrade vertebrals\par \par CTA (Z) 2022 - >80% stenosis of the R ICA (1cm distal to origin), moderate stenosis of R cavernous and supraclinoid ICA, 60% stenosis of L ICA\par \par CTA images reviewed. R CCA and ICA appear to be anatomically suitable for TCAR. \par \par Discussed R TCAR with patient. R/B/A explained. All questions/concerns addressed at this time. Patient agrees to surgical plan. Will have office call patient to schedule. She will start Plavix 75 mg today (script sent to pharmacy).\par \par Patient reports multiple medication/food allergies and intolerances, noted below:\par Brilinta - was taking it for CAD s/p multiple coronary artery stents, discontinued due to dyspnea after several days of taking the medication\par Morphine - nausea (intolerance) but does well when given anti-emetic\par gluten - h/o celiac disease\par peanuts - anaphylaxis\par \par Given history of severe dyspnea with Brilinta, will hold off on P2Y12 testing. To be discussed with Dr. Alarcon.

## 2023-01-17 ENCOUNTER — OUTPATIENT (OUTPATIENT)
Dept: OUTPATIENT SERVICES | Facility: HOSPITAL | Age: 63
LOS: 1 days | End: 2023-01-17

## 2023-01-17 VITALS
HEIGHT: 64 IN | DIASTOLIC BLOOD PRESSURE: 82 MMHG | OXYGEN SATURATION: 98 % | SYSTOLIC BLOOD PRESSURE: 157 MMHG | HEART RATE: 60 BPM | RESPIRATION RATE: 18 BRPM | TEMPERATURE: 98 F | WEIGHT: 154.1 LBS

## 2023-01-17 DIAGNOSIS — D21.9 BENIGN NEOPLASM OF CONNECTIVE AND OTHER SOFT TISSUE, UNSPECIFIED: Chronic | ICD-10-CM

## 2023-01-17 DIAGNOSIS — I65.23 OCCLUSION AND STENOSIS OF BILATERAL CAROTID ARTERIES: ICD-10-CM

## 2023-01-17 DIAGNOSIS — Z87.42 PERSONAL HISTORY OF OTHER DISEASES OF THE FEMALE GENITAL TRACT: Chronic | ICD-10-CM

## 2023-01-17 DIAGNOSIS — I65.21 OCCLUSION AND STENOSIS OF RIGHT CAROTID ARTERY: ICD-10-CM

## 2023-01-17 DIAGNOSIS — K27.5 CHRONIC OR UNSPECIFIED PEPTIC ULCER, SITE UNSPECIFIED, WITH PERFORATION: Chronic | ICD-10-CM

## 2023-01-17 DIAGNOSIS — Z98.62 PERIPHERAL VASCULAR ANGIOPLASTY STATUS: Chronic | ICD-10-CM

## 2023-01-17 DIAGNOSIS — Z98.890 OTHER SPECIFIED POSTPROCEDURAL STATES: Chronic | ICD-10-CM

## 2023-01-17 DIAGNOSIS — I10 ESSENTIAL (PRIMARY) HYPERTENSION: ICD-10-CM

## 2023-01-17 DIAGNOSIS — Z90.49 ACQUIRED ABSENCE OF OTHER SPECIFIED PARTS OF DIGESTIVE TRACT: Chronic | ICD-10-CM

## 2023-01-17 DIAGNOSIS — Z95.5 PRESENCE OF CORONARY ANGIOPLASTY IMPLANT AND GRAFT: Chronic | ICD-10-CM

## 2023-01-17 DIAGNOSIS — Z95.5 PRESENCE OF CORONARY ANGIOPLASTY IMPLANT AND GRAFT: ICD-10-CM

## 2023-01-17 LAB
ALBUMIN SERPL ELPH-MCNC: 4.3 G/DL — SIGNIFICANT CHANGE UP (ref 3.3–5)
ALP SERPL-CCNC: 75 U/L — SIGNIFICANT CHANGE UP (ref 40–120)
ALT FLD-CCNC: 11 U/L — SIGNIFICANT CHANGE UP (ref 4–33)
ANION GAP SERPL CALC-SCNC: 12 MMOL/L — SIGNIFICANT CHANGE UP (ref 7–14)
AST SERPL-CCNC: 18 U/L — SIGNIFICANT CHANGE UP (ref 4–32)
BILIRUB SERPL-MCNC: <0.2 MG/DL — SIGNIFICANT CHANGE UP (ref 0.2–1.2)
BLD GP AB SCN SERPL QL: NEGATIVE — SIGNIFICANT CHANGE UP
BUN SERPL-MCNC: 14 MG/DL — SIGNIFICANT CHANGE UP (ref 7–23)
CALCIUM SERPL-MCNC: 9.4 MG/DL — SIGNIFICANT CHANGE UP (ref 8.4–10.5)
CHLORIDE SERPL-SCNC: 110 MMOL/L — HIGH (ref 98–107)
CO2 SERPL-SCNC: 23 MMOL/L — SIGNIFICANT CHANGE UP (ref 22–31)
CREAT SERPL-MCNC: 0.87 MG/DL — SIGNIFICANT CHANGE UP (ref 0.5–1.3)
EGFR: 76 ML/MIN/1.73M2 — SIGNIFICANT CHANGE UP
GLUCOSE SERPL-MCNC: 61 MG/DL — LOW (ref 70–99)
HCT VFR BLD CALC: 41.6 % — SIGNIFICANT CHANGE UP (ref 34.5–45)
HGB BLD-MCNC: 13.6 G/DL — SIGNIFICANT CHANGE UP (ref 11.5–15.5)
MCHC RBC-ENTMCNC: 32.7 GM/DL — SIGNIFICANT CHANGE UP (ref 32–36)
MCHC RBC-ENTMCNC: 33.1 PG — SIGNIFICANT CHANGE UP (ref 27–34)
MCV RBC AUTO: 101.2 FL — HIGH (ref 80–100)
NRBC # BLD: 0 /100 WBCS — SIGNIFICANT CHANGE UP (ref 0–0)
NRBC # FLD: 0 K/UL — SIGNIFICANT CHANGE UP (ref 0–0)
PLATELET # BLD AUTO: 138 K/UL — LOW (ref 150–400)
POTASSIUM SERPL-MCNC: 3.5 MMOL/L — SIGNIFICANT CHANGE UP (ref 3.5–5.3)
POTASSIUM SERPL-SCNC: 3.5 MMOL/L — SIGNIFICANT CHANGE UP (ref 3.5–5.3)
PROT SERPL-MCNC: 7.1 G/DL — SIGNIFICANT CHANGE UP (ref 6–8.3)
RBC # BLD: 4.11 M/UL — SIGNIFICANT CHANGE UP (ref 3.8–5.2)
RBC # FLD: 13.6 % — SIGNIFICANT CHANGE UP (ref 10.3–14.5)
RH IG SCN BLD-IMP: NEGATIVE — SIGNIFICANT CHANGE UP
SODIUM SERPL-SCNC: 145 MMOL/L — SIGNIFICANT CHANGE UP (ref 135–145)
WBC # BLD: 8.12 K/UL — SIGNIFICANT CHANGE UP (ref 3.8–10.5)
WBC # FLD AUTO: 8.12 K/UL — SIGNIFICANT CHANGE UP (ref 3.8–10.5)

## 2023-01-17 NOTE — H&P PST ADULT - PROBLEM SELECTOR PLAN 3
Pt. instructed to continue baby ASA and Plavix.  Pt. to have cardiac clearance as requested by the Surgeon.  Will request it be faxed to PST.  ECHO is in the chart.  Will request the stress test be faxed to PST.

## 2023-01-17 NOTE — H&P PST ADULT - PROBLEM SELECTOR PLAN 1
After Visit Summary   2/22/2017    Maryrcuz Tran    MRN: [id number]           Patient Information     Date Of Birth          1959        Visit Information        Provider Department      2/22/2017 10:10 AM Radha Mccann PT [facility] Physical [facility]        Today's Diagnoses     Left hip pain           Follow-ups after your visit        Your next 10 appointments already scheduled     Feb 28, 2017  4:00 PM CST   AN[facility]BAL Chiropractor with Forest Barroso DC   [facility] [place] Chi[facility]ractor ([facility] [place])    [address] [place] [address]  [address]  [place] [address]   [phone]            Mar 08, 2017  9:45 AM CST   ANÍBAL Chiropractor with Forest Barroso DC   [facility] [place] [facility] ([facility] [place]  )    [address]  [place] [address]   [phone]            Mar 14, 2017  4:45 PM CDT   ANÍBAL Chiropractor with Forest Barroso DC   [facility] [place] Chiropractor ([facility] [place])    [address] [place] [address]  [address]  [place] [address]   [phone]              Who to contact     If you have questions or need follow up information about today's clinic visit or your schedule please contact [facility] PH[facility] directly at [phone].  Normal or non-critical lab and imaging results will be communicated to you by [employer], letter or phone within 4 business days after the clinic has received the results. If you do not hear from us within 7 days, please contact the clinic through nprogresst or phone. If you have a critical or abnormal lab result, we will notify you by phone as soon as possible.  Submit refill requests through [employer] or call your pharmacy and they will forward the refill request to us. Please allow 3 business days for your refill to be completed.          Additional Information About Your Visit        [employer] Information     [employer] gives you  secure access to your electronic health record. If you see a primary care provider, you can also send messages to your care team and make appointments. If you have questions, please call your primary care clinic.  If you do not have a primary care provider, please call [phone] and they will assist you.        Care EveryWhere ID     This is your Care EveryWhere ID. This could be used by other organizations to access your [facility] medical records  [id number]         Blood Pressure from Last 3 Encounters:   12/06/16 120/80   10/03/16 104/75   01/08/16 113/81    Weight from Last 3 Encounters:   12/06/16 68.4 kg (150 lb 12.8 oz)   10/03/16 63.5 kg (140 lb)   01/08/16 66.8 kg (147 lb 3.2 oz)              We Performed the Following     ANÍBAL PROGRESS NOTES REPORT     MANUAL THER TECH,1+REGIONS,EA 15 MIN     THERAPEUTIC EXERCISES     ULTRASOUND THERAPY        Primary Care Provider Office Phone # Fax #    Fabienne Haley [phone]       [facility] WOMEN [address] [place] [address] [address]  [facility] [address]        Thank you!     Thank you for choosing [facility]  for your care. Our goal is always to provide you with excellent care. Hearing back from our patients is one way we can continue to improve our services. Please take a few minutes to complete the written survey that you may receive in the mail after your visit with us. Thank you!             Your Updated Medication List - Protect others around you: Learn how to safely use, store and throw away your medicines at www.disposemymeds.org.          This list is accurate as of: 2/22/17 10:52 AM.  Always use your most recent med list.                   Brand Name Dispense Instructions for use    calcium carb-cholecalciferol 600-500 MG-UNIT Caps      Take 1 tablet by mouth 2 times daily       estradiol 0.1 MG/GM cream    ESTRACE VAGINAL    42.5 g    Place 1 g vaginally twice a week Place 1g vaginally nightly  x 2 weeks and then twice weely for maintenance       IBUPROFEN PO      Take 400 mg by mouth every 4 hours as needed for moderate pain       Multi-vitamin Tabs tablet      Take 1 tablet by mouth daily          Pt. is scheduled for a right...transcarotid artery revascularization...1/24/23.  Pt. verbalized understanding of instructions and that Chlorhexidine is for external use. Pt. is scheduled for a right...transcarotid artery revascularization...1/24/23.  Pt. verbalized understanding of instructions and that Chlorhexidine is for external use.    Pt. has 4 coronary stents.

## 2023-01-17 NOTE — H&P PST ADULT - NSICDXPASTSURGICALHX_GEN_ALL_CORE_FT
PAST SURGICAL HISTORY:  Fibroid laproscopic removal    H/O angioplasty stents placed in 2007 & 5/2019    History of appendectomy     History of bilateral breast reduction surgery     History of lumpectomy     History of ovarian cyst benign    Perforated ulcer treated with emergent surgery    Stented coronary artery

## 2023-01-17 NOTE — H&P PST ADULT - NSICDXPASTMEDICALHX_GEN_ALL_CORE_FT
PAST MEDICAL HISTORY:  Anxiety     Bipolar 1 disorder resolved per patient    CAD (coronary artery disease)     Heart disease     History of breast cancer     History of herniated intervertebral disc     Hyperlipidemia     PTSD (post-traumatic stress disorder)     Secondary hypertension      PAST MEDICAL HISTORY:  Anxiety     Bipolar 1 disorder resolved per patient    CAD (coronary artery disease)     Heart disease     History of breast cancer     History of herniated intervertebral disc     Hyperlipidemia     PTSD (post-traumatic stress disorder)     Right carotid artery occlusion     Secondary hypertension      PAST MEDICAL HISTORY:  Anxiety     Bipolar 1 disorder resolved per patient    CAD (coronary artery disease)     Heart disease     History of breast cancer     History of herniated intervertebral disc     Hyperlipidemia     Psoriasis     PTSD (post-traumatic stress disorder)     Right carotid artery occlusion     Secondary hypertension

## 2023-01-17 NOTE — H&P PST ADULT - FUNCTIONAL STATUS
"housework and boyfriend, I used to be a  so every now and then some push ups", recently has had SOB and palpitations/less than 4 METS

## 2023-01-17 NOTE — H&P PST ADULT - NSICDXFAMILYHX_GEN_ALL_CORE_FT
FAMILY HISTORY:  Family history of breast cancer in mother, Mother  at age 55 of breast cancer  FH: lung cancer, Father  of Lung Cancer @ age 55; Brother currently has lung cancer  FHx: diabetes mellitus, sister    Grandparent  Still living? No  FH: heart attack, Age at diagnosis: Age Unknown

## 2023-01-17 NOTE — H&P PST ADULT - LAST CARDIAC ANGIOGRAM/IMAGING
[facility] no [facility] were [facility] at that time, however pt. states she has 4 stents from prior angiograms

## 2023-01-17 NOTE — H&P PST ADULT - NSANTHOSAYNRD_GEN_A_CORE
No. ROBLES screening performed.  STOP BANG Legend: 0-2 = LOW Risk; 3-4 = INTERMEDIATE Risk; 5-8 = HIGH Risk

## 2023-01-23 ENCOUNTER — TRANSCRIPTION ENCOUNTER (OUTPATIENT)
Age: 63
End: 2023-01-23

## 2023-01-23 NOTE — ASU PATIENT PROFILE, ADULT - NSICDXPASTMEDICALHX_GEN_ALL_CORE_FT
PAST MEDICAL HISTORY:  Anxiety     Bipolar 1 disorder resolved per patient    CAD (coronary artery disease)     Heart disease     History of breast cancer     History of herniated intervertebral disc     Hyperlipidemia     Psoriasis     PTSD (post-traumatic stress disorder)     Right carotid artery occlusion     Secondary hypertension

## 2023-01-24 ENCOUNTER — INPATIENT (INPATIENT)
Facility: HOSPITAL | Age: 63
LOS: 1 days | Discharge: HOME | End: 2023-01-26
Attending: SURGERY | Admitting: SURGERY
Payer: MEDICAID

## 2023-01-24 ENCOUNTER — APPOINTMENT (OUTPATIENT)
Dept: VASCULAR SURGERY | Facility: HOSPITAL | Age: 63
End: 2023-01-24

## 2023-01-24 ENCOUNTER — TRANSCRIPTION ENCOUNTER (OUTPATIENT)
Age: 63
End: 2023-01-24

## 2023-01-24 VITALS
SYSTOLIC BLOOD PRESSURE: 122 MMHG | RESPIRATION RATE: 16 BRPM | WEIGHT: 154.1 LBS | HEART RATE: 54 BPM | HEIGHT: 64 IN | OXYGEN SATURATION: 100 % | DIASTOLIC BLOOD PRESSURE: 61 MMHG | TEMPERATURE: 98 F

## 2023-01-24 DIAGNOSIS — Z98.62 PERIPHERAL VASCULAR ANGIOPLASTY STATUS: Chronic | ICD-10-CM

## 2023-01-24 DIAGNOSIS — Z87.42 PERSONAL HISTORY OF OTHER DISEASES OF THE FEMALE GENITAL TRACT: Chronic | ICD-10-CM

## 2023-01-24 DIAGNOSIS — Z90.49 ACQUIRED ABSENCE OF OTHER SPECIFIED PARTS OF DIGESTIVE TRACT: Chronic | ICD-10-CM

## 2023-01-24 DIAGNOSIS — Z98.890 OTHER SPECIFIED POSTPROCEDURAL STATES: Chronic | ICD-10-CM

## 2023-01-24 DIAGNOSIS — Z95.5 PRESENCE OF CORONARY ANGIOPLASTY IMPLANT AND GRAFT: Chronic | ICD-10-CM

## 2023-01-24 DIAGNOSIS — I65.23 OCCLUSION AND STENOSIS OF BILATERAL CAROTID ARTERIES: ICD-10-CM

## 2023-01-24 DIAGNOSIS — K27.5 CHRONIC OR UNSPECIFIED PEPTIC ULCER, SITE UNSPECIFIED, WITH PERFORATION: Chronic | ICD-10-CM

## 2023-01-24 DIAGNOSIS — D21.9 BENIGN NEOPLASM OF CONNECTIVE AND OTHER SOFT TISSUE, UNSPECIFIED: Chronic | ICD-10-CM

## 2023-01-24 LAB
GAS PNL BLDA: SIGNIFICANT CHANGE UP
RH IG SCN BLD-IMP: NEGATIVE — SIGNIFICANT CHANGE UP

## 2023-01-24 PROCEDURE — 37215 TRANSCATH STENT CCA W/EPS: CPT | Mod: RT

## 2023-01-24 PROCEDURE — 76937 US GUIDE VASCULAR ACCESS: CPT | Mod: 26

## 2023-01-24 DEVICE — KIT A-LINE 1LUM 20G X 12CM SAFE KIT: Type: IMPLANTABLE DEVICE | Site: RIGHT | Status: FUNCTIONAL

## 2023-01-24 DEVICE — GWIRE VASC ENTRY MINISTICK MAX 4FRX10CM: Type: IMPLANTABLE DEVICE | Site: RIGHT | Status: FUNCTIONAL

## 2023-01-24 DEVICE — STENT TRANSCAROTID ENROUTE 9X40: Type: IMPLANTABLE DEVICE | Site: RIGHT | Status: FUNCTIONAL

## 2023-01-24 DEVICE — BLLN STERLING MR 5X30MMX80CM: Type: IMPLANTABLE DEVICE | Site: RIGHT | Status: FUNCTIONAL

## 2023-01-24 DEVICE — KIT INTRODUCER MICRO 21GAX7CM 7FR: Type: IMPLANTABLE DEVICE | Site: RIGHT | Status: FUNCTIONAL

## 2023-01-24 DEVICE — GWIRE ENROUTE 0.014X95CM: Type: IMPLANTABLE DEVICE | Site: RIGHT | Status: FUNCTIONAL

## 2023-01-24 DEVICE — IMPLANTABLE DEVICE: Type: IMPLANTABLE DEVICE | Site: RIGHT | Status: FUNCTIONAL

## 2023-01-24 DEVICE — SYS TRANSCAROTID NEUROPROTECTION: Type: IMPLANTABLE DEVICE | Site: RIGHT | Status: FUNCTIONAL

## 2023-01-24 RX ORDER — PANTOPRAZOLE SODIUM 20 MG/1
40 TABLET, DELAYED RELEASE ORAL
Refills: 0 | Status: DISCONTINUED | OUTPATIENT
Start: 2023-01-24 | End: 2023-01-26

## 2023-01-24 RX ORDER — SODIUM CHLORIDE 9 MG/ML
1000 INJECTION, SOLUTION INTRAVENOUS
Refills: 0 | Status: DISCONTINUED | OUTPATIENT
Start: 2023-01-24 | End: 2023-01-25

## 2023-01-24 RX ORDER — LORATADINE 10 MG/1
10 TABLET ORAL DAILY
Refills: 0 | Status: DISCONTINUED | OUTPATIENT
Start: 2023-01-25 | End: 2023-01-26

## 2023-01-24 RX ORDER — LANOLIN ALCOHOL/MO/W.PET/CERES
3 CREAM (GRAM) TOPICAL AT BEDTIME
Refills: 0 | Status: DISCONTINUED | OUTPATIENT
Start: 2023-01-24 | End: 2023-01-26

## 2023-01-24 RX ORDER — ASPIRIN/CALCIUM CARB/MAGNESIUM 324 MG
81 TABLET ORAL DAILY
Refills: 0 | Status: DISCONTINUED | OUTPATIENT
Start: 2023-01-25 | End: 2023-01-26

## 2023-01-24 RX ORDER — HYDROMORPHONE HYDROCHLORIDE 2 MG/ML
1 INJECTION INTRAMUSCULAR; INTRAVENOUS; SUBCUTANEOUS
Refills: 0 | Status: DISCONTINUED | OUTPATIENT
Start: 2023-01-24 | End: 2023-01-24

## 2023-01-24 RX ORDER — ISOSORBIDE MONONITRATE 60 MG/1
30 TABLET, EXTENDED RELEASE ORAL DAILY
Refills: 0 | Status: DISCONTINUED | OUTPATIENT
Start: 2023-01-25 | End: 2023-01-26

## 2023-01-24 RX ORDER — OXYCODONE HYDROCHLORIDE 5 MG/1
5 TABLET ORAL ONCE
Refills: 0 | Status: DISCONTINUED | OUTPATIENT
Start: 2023-01-24 | End: 2023-01-24

## 2023-01-24 RX ORDER — ONDANSETRON 8 MG/1
4 TABLET, FILM COATED ORAL ONCE
Refills: 0 | Status: COMPLETED | OUTPATIENT
Start: 2023-01-24 | End: 2023-01-24

## 2023-01-24 RX ORDER — ACETAMINOPHEN 500 MG
1000 TABLET ORAL ONCE
Refills: 0 | Status: COMPLETED | OUTPATIENT
Start: 2023-01-24 | End: 2023-01-24

## 2023-01-24 RX ORDER — PSEUDOEPHEDRINE HCL 30 MG
60 TABLET ORAL ONCE
Refills: 0 | Status: COMPLETED | OUTPATIENT
Start: 2023-01-24 | End: 2023-01-24

## 2023-01-24 RX ORDER — HYDROMORPHONE HYDROCHLORIDE 2 MG/ML
0.5 INJECTION INTRAMUSCULAR; INTRAVENOUS; SUBCUTANEOUS
Refills: 0 | Status: DISCONTINUED | OUTPATIENT
Start: 2023-01-24 | End: 2023-01-24

## 2023-01-24 RX ORDER — ATORVASTATIN CALCIUM 80 MG/1
80 TABLET, FILM COATED ORAL AT BEDTIME
Refills: 0 | Status: DISCONTINUED | OUTPATIENT
Start: 2023-01-24 | End: 2023-01-26

## 2023-01-24 RX ORDER — SODIUM CHLORIDE 9 MG/ML
1000 INJECTION, SOLUTION INTRAVENOUS
Refills: 0 | Status: DISCONTINUED | OUTPATIENT
Start: 2023-01-24 | End: 2023-01-24

## 2023-01-24 RX ORDER — METOPROLOL TARTRATE 50 MG
25 TABLET ORAL DAILY
Refills: 0 | Status: DISCONTINUED | OUTPATIENT
Start: 2023-01-24 | End: 2023-01-26

## 2023-01-24 RX ORDER — LISINOPRIL 2.5 MG/1
30 TABLET ORAL DAILY
Refills: 0 | Status: DISCONTINUED | OUTPATIENT
Start: 2023-01-25 | End: 2023-01-26

## 2023-01-24 RX ORDER — CLOPIDOGREL BISULFATE 75 MG/1
75 TABLET, FILM COATED ORAL DAILY
Refills: 0 | Status: DISCONTINUED | OUTPATIENT
Start: 2023-01-25 | End: 2023-01-26

## 2023-01-24 RX ORDER — PANTOPRAZOLE SODIUM 20 MG/1
20 TABLET, DELAYED RELEASE ORAL
Refills: 0 | Status: DISCONTINUED | OUTPATIENT
Start: 2023-01-24 | End: 2023-01-24

## 2023-01-24 RX ADMIN — Medication 1000 MILLIGRAM(S): at 13:45

## 2023-01-24 RX ADMIN — Medication 3 MILLIGRAM(S): at 23:14

## 2023-01-24 RX ADMIN — ONDANSETRON 4 MILLIGRAM(S): 8 TABLET, FILM COATED ORAL at 12:44

## 2023-01-24 RX ADMIN — HYDROMORPHONE HYDROCHLORIDE 1 MILLIGRAM(S): 2 INJECTION INTRAMUSCULAR; INTRAVENOUS; SUBCUTANEOUS at 14:45

## 2023-01-24 RX ADMIN — Medication 400 MILLIGRAM(S): at 13:15

## 2023-01-24 RX ADMIN — Medication 60 MILLIGRAM(S): at 13:15

## 2023-01-24 RX ADMIN — SODIUM CHLORIDE 100 MILLILITER(S): 9 INJECTION, SOLUTION INTRAVENOUS at 10:55

## 2023-01-24 RX ADMIN — HYDROMORPHONE HYDROCHLORIDE 1 MILLIGRAM(S): 2 INJECTION INTRAMUSCULAR; INTRAVENOUS; SUBCUTANEOUS at 14:08

## 2023-01-24 RX ADMIN — Medication 1000 MILLIGRAM(S): at 23:44

## 2023-01-24 RX ADMIN — HYDROMORPHONE HYDROCHLORIDE 0.5 MILLIGRAM(S): 2 INJECTION INTRAMUSCULAR; INTRAVENOUS; SUBCUTANEOUS at 12:14

## 2023-01-24 RX ADMIN — HYDROMORPHONE HYDROCHLORIDE 0.5 MILLIGRAM(S): 2 INJECTION INTRAMUSCULAR; INTRAVENOUS; SUBCUTANEOUS at 11:15

## 2023-01-24 RX ADMIN — ONDANSETRON 4 MILLIGRAM(S): 8 TABLET, FILM COATED ORAL at 15:08

## 2023-01-24 RX ADMIN — OXYCODONE HYDROCHLORIDE 5 MILLIGRAM(S): 5 TABLET ORAL at 17:30

## 2023-01-24 RX ADMIN — HYDROMORPHONE HYDROCHLORIDE 0.5 MILLIGRAM(S): 2 INJECTION INTRAMUSCULAR; INTRAVENOUS; SUBCUTANEOUS at 11:50

## 2023-01-24 RX ADMIN — HYDROMORPHONE HYDROCHLORIDE 0.5 MILLIGRAM(S): 2 INJECTION INTRAMUSCULAR; INTRAVENOUS; SUBCUTANEOUS at 11:45

## 2023-01-24 RX ADMIN — ATORVASTATIN CALCIUM 80 MILLIGRAM(S): 80 TABLET, FILM COATED ORAL at 23:46

## 2023-01-24 RX ADMIN — OXYCODONE HYDROCHLORIDE 5 MILLIGRAM(S): 5 TABLET ORAL at 16:20

## 2023-01-24 RX ADMIN — Medication 400 MILLIGRAM(S): at 23:14

## 2023-01-24 NOTE — ASU PREOP CHECKLIST - COMMENTS
sip of water with metoprolol, imdur, protonix, claritin, @ 4AM sip of water with metoprolol, imdur, protonix, claritin, asa, plavix @ 4AM

## 2023-01-24 NOTE — DISCHARGE NOTE PROVIDER - NSDCCPCAREPLAN_GEN_ALL_CORE_FT
PRINCIPAL DISCHARGE DIAGNOSIS  Diagnosis: Carotid occlusion, right  Assessment and Plan of Treatment:

## 2023-01-24 NOTE — CHART NOTE - NSCHARTNOTEFT_GEN_A_CORE
Post Operative Note    Procedure: S/P Right TCAR   Indication: right carotid stenosis   Surgeon: Dr. Alarcon     Subjective: Patient seen and examined bedside. Patient reports some pain but states it is tolerable. Denies leg pain, changes in vision, weakness, groin pain. Denies chest pain, SOB, N/V, fever, chills.     Objective:  Vitals: T(F): 97.6 (01-24-23 @ 13:00), Max: 98.1 (01-24-23 @ 06:06)  HR: 53 (01-24-23 @ 14:00)  BP: 128/77 (01-24-23 @ 14:00) (122/61 - 156/84)  RR: 16 (01-24-23 @ 14:00)  SpO2: 97% (01-24-23 @ 14:00)      In:   01-24-23 @ 07:01 - 01-24-23 @ 14:27  --------------------------------------------------------  IN: 500 mL      IV Fluids: lactated ringers. 1000 milliLiter(s) (100 mL/Hr) IV Continuous <Continuous>      Out:   01-24-23 @ 07:01 - 01-24-23 @ 14:27  --------------------------------------------------------  OUT: 0 mL      EBL:     Voided Urine:   01-24-23 @ 07:01 - 01-24-23 @ 14:27  --------------------------------------------------------  OUT: 0 mL        Physical Examination:  General Appearance: NAD, alert and cooperative. A&Ox4.   Neuro: CN grossly intact. Sensory intact in upper and lower extremities. Motor intact in upper and lower extremities. PERRLA. EOMI. Moving all extremities spontaneously with no restrictions.   Heart: S1 and S2. RRR on monitor. Extremities are warm and well perfused.   Lungs: Normal cw expansion, No signs of respiratory distress.  Abdomen:  soft, nontender, nondistended.   Vascular: Left groin soft without hematoma or ecchymosis, dressing in place with minimal SS drainage. Right carotid incision c/d/i, no drainage.         Medications: [Standing]  atorvastatin 80 milliGRAM(s) Oral at bedtime  HYDROmorphone  Injectable 0.5 milliGRAM(s) IV Push every 10 minutes PRN  HYDROmorphone  Injectable 1 milliGRAM(s) IV Push every 10 minutes PRN  lactated ringers. 1000 milliLiter(s) IV Continuous <Continuous>  metoprolol succinate ER 25 milliGRAM(s) Oral daily  pantoprazole    Tablet 20 milliGRAM(s) Oral before breakfast    Medications: [PRN]  atorvastatin 80 milliGRAM(s) Oral at bedtime  HYDROmorphone  Injectable 0.5 milliGRAM(s) IV Push every 10 minutes PRN  HYDROmorphone  Injectable 1 milliGRAM(s) IV Push every 10 minutes PRN  lactated ringers. 1000 milliLiter(s) IV Continuous <Continuous>  metoprolol succinate ER 25 milliGRAM(s) Oral daily  pantoprazole    Tablet 20 milliGRAM(s) Oral before breakfast      Imaging:  No post-op imaging studies    Assessment:  63y/o F/M now s/p above named procedure, POD 0.      PLAN:  - Diet: Regular   - pain control prn  - SBP goal 110-160  - monitor I&Os  - f/u AM labs, replete prn  - ASA/Plavix   - neurological checks   - dispo: transfer to surgical floor for further management    Vascular surgery  [id number]

## 2023-01-24 NOTE — DISCHARGE NOTE PROVIDER - CARE PROVIDER_API CALL
Dustin Alarcon)  Surgery; Vascular Surgery  [phone] [place] [address]  Phone: [phone]  Fax: [phone]  Follow Up Time: 1 week

## 2023-01-24 NOTE — DISCHARGE NOTE PROVIDER - NSDCACTIVITY_GEN_ALL_CORE
Bathing allowed/Showering allowed/No heavy lifting/straining/Follow Instructions Provided by your Surgical Team

## 2023-01-24 NOTE — BRIEF OPERATIVE NOTE - NSICDXBRIEFPOSTOP_GEN_ALL_CORE_FT
POST-OP DIAGNOSIS:  Stenosis of right carotid artery 24-Jan-2023 10:24:35  Amaury Rodriguez   Primary Care Provider,   Phone: (   )    -  Fax: (   )    - Primary Care Provider,   Phone: (   )    -  Fax: (   )    -    [facility],   Phone: [phone]  Fax: (   )    -

## 2023-01-24 NOTE — DISCHARGE NOTE PROVIDER - NSDCFUADDINST_GEN_ALL_CORE_FT
WOUND CARE:  Please keep incisions clean and dry. Please do not Scrub or rub incisions. Do not use lotion or powder on incisions.   BATHING: You may shower and/or sponge bathe. You may use warm soapy water in the shower and rinse, pat dry.   PAIN: You may take over-the-counter medications for pain. You make take Tylenol orally every 6 hours as needed. Do not excessed 4,000mg a day. You may take ibuprofen every 6 hours. You may alternate between the two for better pain control. If you have been prescribed narcotics for pain management, please take as prescribed on pill bottle, AS NEEDED, for breakthrough pain.   MEDICATIONS: Please continue home medications as prescribed. If you have any questions, please call your surgeon's office.   ACTIVITY: No heavy lifting or straining. Otherwise, you may return to your usual level of physical activity. If you are taking narcotic pain medication DO NOT drive a car, operate machinery or make important decisions.  DIET: Return to your usual diet.    NOTIFY YOUR SURGEON IF YOU HAVE: any bleeding that does not stop, any pus draining from your wound(s), any fever (over 100.4 F) persistent nausea/vomiting, inability to urinate, or if your pain is not controlled on your discharge pain medications.     FOLLOW UP:  1. Please follow up with your primary care physician in one week regarding your hospitalization, bring copies of your discharge paperwork.  2. Please follow up with your surgeon, Dr. Alarcon 1-2 weeks after discharge. Please call the office to schedule the appointment or if you have any questions. WOUND CARE:  Please keep incisions clean and dry. Please do not Scrub or rub incisions. Do not use lotion or powder on incisions.   BATHING: You may shower and/or sponge bathe. You may use warm soapy water in the shower and rinse, pat dry.   PAIN: You may take over-the-counter medications for pain. You make take Tylenol orally every 6 hours as needed. Do not excessed 4,000mg a day. You may take ibuprofen every 6 hours. You may alternate between the two for better pain control. If you have been prescribed narcotics for pain management, please take as prescribed on pill bottle, AS NEEDED, for breakthrough pain.   MEDICATIONS: Please continue home medications as prescribed. If you have any questions, please call your surgeon's office. You will be discharged on Aspirin and Plavix. If you have any questions please call your surgeon's office.   ACTIVITY: No heavy lifting or straining. Otherwise, you may return to your usual level of physical activity. If you are taking narcotic pain medication DO NOT drive a car, operate machinery or make important decisions.  DIET: Return to your usual diet.    NOTIFY YOUR SURGEON IF YOU HAVE: any bleeding that does not stop, any pus draining from your wound(s), any fever (over 100.4 F) persistent nausea/vomiting, inability to urinate, or if your pain is not controlled on your discharge pain medications.     FOLLOW UP:  1. Please follow up with your primary care physician in one week regarding your hospitalization, bring copies of your discharge paperwork.  2. Please follow up with your surgeon, Dr. Alarcon 1-2 weeks after discharge. Please call the office to schedule the appointment or if you have any questions. WOUND CARE:  Please keep incisions clean and dry. Please do not Scrub or rub incisions. Do not use lotion or powder on incisions.   BATHING: You may shower and/or sponge bathe. You may use warm soapy water in the shower and rinse, pat dry.   PAIN: You may take over-the-counter medications for pain. You make take Tylenol orally every 6 hours as needed. Do not excessed 4,000mg a day.  MEDICATIONS: Please continue home medications as prescribed. If you have any questions, please call your surgeon's office. You will be discharged on Aspirin and Plavix. If you have any questions please call your surgeon's office.   ACTIVITY: No heavy lifting or straining. Otherwise, you may return to your usual level of physical activity. If you are taking narcotic pain medication DO NOT drive a car, operate machinery or make important decisions.  DIET: Return to your usual diet.    NOTIFY YOUR SURGEON IF YOU HAVE: any bleeding that does not stop, any pus draining from your wound(s), any fever (over 100.4 F) persistent nausea/vomiting, inability to urinate, or if your pain is not controlled on your discharge pain medications.     FOLLOW UP:  1. Please follow up with your primary care physician in one week regarding your hospitalization, bring copies of your discharge paperwork.  2. Please follow up with your surgeon, Dr. Alarcon 1-2 weeks after discharge. Please call the office to schedule the appointment or if you have any questions.

## 2023-01-24 NOTE — ASU PREOP CHECKLIST - NOTHING BY MOUTH SINCE
Pt states that he was just discharged from the [facility] yesterday and would like to know if the doctor can give him orders for physical therapy. 23-Jan-2023 22:00

## 2023-01-24 NOTE — DISCHARGE NOTE PROVIDER - HOSPITAL COURSE
Patient is a female with PMHx HTN, PTSD, bipolar, anxiety, CAD with stents, HLD, breast CA, and carotid stenosis. Patient presented to [facility] for scheduled procedure. She is now s/p Right TCAR on 1/24/2023 with Dr. Alarcon.     Patient tolerated operation well and there were no post-operative complications identified. Patient remained hemodynamically stable in the PACU and was transferred to the surgical floor for further monitoring. Patient neurological status was monitored post-operatively. Diet was restarted and advanced as tolerated. Pain control was transitioned from IV to PO pain meds.     At this time, patient is currently ambulating, voiding, tolerating a regular diet, and is neurologically at baseline. Pain well controlled on PO pain meds. Patient is hemodynamically stable and felt safe with being discharged. Patient understood and agreed with plan. Per Dr. Alarcon, patient is stable for discharge to home with outpatient follow up within 1-2 weeks of discharge.      ****INCOMPLETE      Patient is a female with PMHx HTN, PTSD, bipolar, anxiety, CAD with stents, HLD, breast CA, and carotid stenosis. Patient presented to [facility] for scheduled procedure. She is now s/p Right TCAR on 1/24/2023 with Dr. Alarcon.     Patient tolerated operation well and there were no post-operative complications identified. Patient remained hemodynamically stable in the PACU and was transferred to the surgical floor for further monitoring. Patient neurological status was monitored post-operatively. Diet was restarted and advanced as tolerated. Pain control was transitioned from IV to PO pain meds. Patient blood pressure was monitored on POD1.      At this time, patient is currently ambulating, voiding, tolerating a regular diet, and is neurologically at baseline. Pain well controlled on PO pain meds. Patient is hemodynamically stable and felt safe with being discharged. Patient understood and agreed with plan. Discussed with patient she will be discharged on ASA/Plavix. Per Dr. Alarcon, patient is stable for discharge to home with outpatient follow up within 1-2 weeks of discharge.      ****INCOMPLETE      Patient is a female with PMHx HTN, PTSD, bipolar, anxiety, CAD with stents, HLD, breast CA, and carotid stenosis. Patient presented to [facility] for scheduled procedure. She is now s/p Right TCAR on 1/24/2023 with Dr. Alarcon.     Patient tolerated operation well and there were no post-operative complications identified. Patient remained hemodynamically stable in the PACU and was transferred to the surgical floor for further monitoring. Patient neurological status was monitored post-operatively. Diet was restarted and advanced as tolerated. Pain control was transitioned from IV to PO pain meds. Patient blood pressure was monitored on POD1.      At this time, patient is currently ambulating, voiding, tolerating a regular diet, and is neurologically at baseline. Pain well controlled on PO pain meds. Patient is hemodynamically stable and felt safe with being discharged. Patient understood and agreed with plan. Discussed with patient she will be discharged on ASA/Plavix. Per Dr. Alarcon, patient is stable for discharge to home with outpatient follow up within 1-2 weeks of discharge.

## 2023-01-24 NOTE — DISCHARGE NOTE PROVIDER - NSDCMRMEDTOKEN_GEN_ALL_CORE_FT
aspirin 81 mg oral delayed release tablet: 1 tab(s) orally once a day am  Claritin 10 mg oral tablet: 1 tab(s) orally once a day am  Crestor 20 mg oral tablet: 1 tab(s) orally once a day am  Imdur 30 mg oral tablet, extended release: 1 tab(s) orally once a day (in the morning) am  Metoprolol Succinate ER 25 mg oral tablet, extended release: 1 tab(s) orally once a day am  nicotine 21 mg/24 hr transdermal film, extended release: 1 patch transdermal once a day  Plavix 75 mg oral tablet: 1 tab(s) orally once a day am  Protonix 20 mg oral delayed release tablet: 1 tab(s) orally once a day am  Zestril 30 mg oral tablet: 1 tab(s) orally once a day am   acetaminophen 325 mg oral tablet: 2 tab(s) orally every 6 hours, As Needed - 3)  aspirin 81 mg oral delayed release tablet: 1 tab(s) orally once a day am  Claritin 10 mg oral tablet: 1 tab(s) orally once a day am  Crestor 20 mg oral tablet: 1 tab(s) orally once a day am  Imdur 30 mg oral tablet, extended release: 1 tab(s) orally once a day (in the morning) am  Metoprolol Succinate ER 25 mg oral tablet, extended release: 1 tab(s) orally once a day am  nicotine 21 mg/24 hr transdermal film, extended release: 1 patch transdermal once a day  Plavix 75 mg oral tablet: 1 tab(s) orally once a day am  Protonix 20 mg oral delayed release tablet: 1 tab(s) orally once a day am  Zestril 30 mg oral tablet: 1 tab(s) orally once a day am

## 2023-01-25 LAB
ANION GAP SERPL CALC-SCNC: 11 MMOL/L — SIGNIFICANT CHANGE UP (ref 7–14)
BLD GP AB SCN SERPL QL: NEGATIVE — SIGNIFICANT CHANGE UP
BUN SERPL-MCNC: 9 MG/DL — SIGNIFICANT CHANGE UP (ref 7–23)
CALCIUM SERPL-MCNC: 8.9 MG/DL — SIGNIFICANT CHANGE UP (ref 8.4–10.5)
CHLORIDE SERPL-SCNC: 107 MMOL/L — SIGNIFICANT CHANGE UP (ref 98–107)
CO2 SERPL-SCNC: 22 MMOL/L — SIGNIFICANT CHANGE UP (ref 22–31)
CREAT SERPL-MCNC: 0.75 MG/DL — SIGNIFICANT CHANGE UP (ref 0.5–1.3)
EGFR: 91 ML/MIN/1.73M2 — SIGNIFICANT CHANGE UP
GIANT PLATELETS BLD QL SMEAR: PRESENT — SIGNIFICANT CHANGE UP
GLUCOSE SERPL-MCNC: 89 MG/DL — SIGNIFICANT CHANGE UP (ref 70–99)
HCT VFR BLD CALC: 37.1 % — SIGNIFICANT CHANGE UP (ref 34.5–45)
HGB BLD-MCNC: 12.2 G/DL — SIGNIFICANT CHANGE UP (ref 11.5–15.5)
MAGNESIUM SERPL-MCNC: 1.9 MG/DL — SIGNIFICANT CHANGE UP (ref 1.6–2.6)
MANUAL SMEAR VERIFICATION: SIGNIFICANT CHANGE UP
MCHC RBC-ENTMCNC: 32.9 GM/DL — SIGNIFICANT CHANGE UP (ref 32–36)
MCHC RBC-ENTMCNC: 33.2 PG — SIGNIFICANT CHANGE UP (ref 27–34)
MCV RBC AUTO: 101.1 FL — HIGH (ref 80–100)
NRBC # BLD: 0 /100 WBCS — SIGNIFICANT CHANGE UP (ref 0–0)
NRBC # FLD: 0 K/UL — SIGNIFICANT CHANGE UP (ref 0–0)
PHOSPHATE SERPL-MCNC: 4 MG/DL — SIGNIFICANT CHANGE UP (ref 2.5–4.5)
PLAT MORPH BLD: ABNORMAL
PLATELET # BLD AUTO: 95 K/UL — LOW (ref 150–400)
PLATELET COUNT - ESTIMATE: ABNORMAL
POTASSIUM SERPL-MCNC: 3.8 MMOL/L — SIGNIFICANT CHANGE UP (ref 3.5–5.3)
POTASSIUM SERPL-SCNC: 3.8 MMOL/L — SIGNIFICANT CHANGE UP (ref 3.5–5.3)
RBC # BLD: 3.67 M/UL — LOW (ref 3.8–5.2)
RBC # FLD: 13.4 % — SIGNIFICANT CHANGE UP (ref 10.3–14.5)
RBC BLD AUTO: NORMAL — SIGNIFICANT CHANGE UP
RH IG SCN BLD-IMP: NEGATIVE — SIGNIFICANT CHANGE UP
SODIUM SERPL-SCNC: 140 MMOL/L — SIGNIFICANT CHANGE UP (ref 135–145)
WBC # BLD: 8.46 K/UL — SIGNIFICANT CHANGE UP (ref 3.8–10.5)
WBC # FLD AUTO: 8.46 K/UL — SIGNIFICANT CHANGE UP (ref 3.8–10.5)

## 2023-01-25 RX ORDER — NICOTINE POLACRILEX 2 MG
21 GUM BUCCAL EVERY 24 HOURS
Refills: 0 | Status: DISCONTINUED | OUTPATIENT
Start: 2023-01-25 | End: 2023-01-26

## 2023-01-25 RX ORDER — INFLUENZA VIRUS VACCINE 15; 15; 15; 15 UG/.5ML; UG/.5ML; UG/.5ML; UG/.5ML
0.5 SUSPENSION INTRAMUSCULAR ONCE
Refills: 0 | Status: DISCONTINUED | OUTPATIENT
Start: 2023-01-25 | End: 2023-01-26

## 2023-01-25 RX ORDER — NICOTINE POLACRILEX 2 MG
4 GUM BUCCAL DAILY
Refills: 0 | Status: DISCONTINUED | OUTPATIENT
Start: 2023-01-25 | End: 2023-01-26

## 2023-01-25 RX ORDER — NICOTINE POLACRILEX 2 MG
7 GUM BUCCAL ONCE
Refills: 0 | Status: DISCONTINUED | OUTPATIENT
Start: 2023-01-25 | End: 2023-01-25

## 2023-01-25 RX ORDER — ACETAMINOPHEN 500 MG
975 TABLET ORAL EVERY 6 HOURS
Refills: 0 | Status: DISCONTINUED | OUTPATIENT
Start: 2023-01-25 | End: 2023-01-26

## 2023-01-25 RX ORDER — NICOTINE POLACRILEX 2 MG
1 GUM BUCCAL DAILY
Refills: 0 | Status: DISCONTINUED | OUTPATIENT
Start: 2023-01-25 | End: 2023-01-25

## 2023-01-25 RX ORDER — PSEUDOEPHEDRINE HCL 30 MG
60 TABLET ORAL ONCE
Refills: 0 | Status: COMPLETED | OUTPATIENT
Start: 2023-01-25 | End: 2023-01-25

## 2023-01-25 RX ADMIN — Medication 975 MILLIGRAM(S): at 22:35

## 2023-01-25 RX ADMIN — Medication 975 MILLIGRAM(S): at 21:35

## 2023-01-25 RX ADMIN — Medication 975 MILLIGRAM(S): at 17:03

## 2023-01-25 RX ADMIN — Medication 4 MILLIGRAM(S): at 17:50

## 2023-01-25 RX ADMIN — LORATADINE 10 MILLIGRAM(S): 10 TABLET ORAL at 12:35

## 2023-01-25 RX ADMIN — Medication 60 MILLIGRAM(S): at 12:35

## 2023-01-25 RX ADMIN — Medication 975 MILLIGRAM(S): at 08:07

## 2023-01-25 RX ADMIN — Medication 975 MILLIGRAM(S): at 16:03

## 2023-01-25 RX ADMIN — Medication 81 MILLIGRAM(S): at 12:34

## 2023-01-25 RX ADMIN — ATORVASTATIN CALCIUM 80 MILLIGRAM(S): 80 TABLET, FILM COATED ORAL at 21:34

## 2023-01-25 RX ADMIN — Medication 21 PATCH: at 17:52

## 2023-01-25 RX ADMIN — PANTOPRAZOLE SODIUM 40 MILLIGRAM(S): 20 TABLET, DELAYED RELEASE ORAL at 05:36

## 2023-01-25 RX ADMIN — Medication 3 MILLIGRAM(S): at 21:33

## 2023-01-25 RX ADMIN — Medication 21 PATCH: at 19:33

## 2023-01-25 RX ADMIN — Medication 1 PATCH: at 12:35

## 2023-01-25 RX ADMIN — Medication 975 MILLIGRAM(S): at 09:07

## 2023-01-25 RX ADMIN — CLOPIDOGREL BISULFATE 75 MILLIGRAM(S): 75 TABLET, FILM COATED ORAL at 12:35

## 2023-01-25 NOTE — PATIENT PROFILE ADULT - FALL HARM RISK - HARM RISK INTERVENTIONS

## 2023-01-25 NOTE — PATIENT PROFILE ADULT - NSPRESCRUSEDDRG_GEN_A_NUR
Nikki Morales   is seen today for a follow-up visit.     Pt recent trip to [facility] and then developed diarrhea, cramps and now bloody diarrhea.  Some issues with perianal sores when pregnant and HSV neg.  No FH IBD.
br
br  PT's sister had HRA in her 20's.  Pt also with prior celiac labs and abnormal bx in [place].  Doing well on gluten free diet and f/u tTG normal on gluten free diet.  Prior colon [place] around 2017, 4/22 normal  Dr White had been doing well but has violent GI symptoms if accidental gluten prior to  trip to [place] No

## 2023-01-25 NOTE — PATIENT PROFILE ADULT - NSPROMEDSBROUGHTTOHOSP_GEN_A_NUR
SUBJECTIVE:   Fran Ellison is a 55 year old who returns today for evaluation of breast pain and postmenopausal bleeding.     Pt was experiencing diffuse breast pain for the past one week, mostly in the outer lateral areas of breast. Her left hurt the worse.  Then all of a sudden, her period came this week.  She is postmenopausal so this bleeding is a surprise    Last period around 44-46yo,   off of OCP at 50yo, was taking OCPs for birth control and for PMS symptoms    Period started on Monday- Tuesday  No bleeding today    Social History:   Social History     Socioeconomic History   • Marital status: Single     Spouse name: Not on file   • Number of children: Not on file   • Years of education: Not on file   • Highest education level: Not on file   Occupational History   • Occupation:    Tobacco Use   • Smoking status: Never Smoker   • Smokeless tobacco: Never Used   Vaping Use   • Vaping Use: never used   Substance and Sexual Activity   • Alcohol use: Yes     Comment: social    • Drug use: Never   • Sexual activity: Not Currently   Other Topics Concern   • Not on file   Social History Narrative   • Not on file     Social Determinants of Health     Financial Resource Strain:    • Social Determinants: Financial Resource Strain: Not on file   Food Insecurity:    • Social Determinants: Food Insecurity: Not on file   Transportation Needs:    • Lack of Transportation (Medical): Not on file   • Lack of Transportation (Non-Medical): Not on file   Physical Activity:    • Days of Exercise per Week: Not on file   • Minutes of Exercise per Session: Not on file   Stress:    • Social Determinants: Stress: Not on file   Social Connections:    • Social Determinants: Social Connections: Not on file   Intimate Partner Violence:    • Social Determinants: Intimate Partner Violence Past Fear: Not on file   • Social Determinants: Intimate Partner Violence Current Fear: Not on file     Past Medical History:   Diagnosis Date    • Chronic gout 11/17/2018   • Essential hypertension 08/16/2017   • History of Papanicolaou smear of cervix 1999    colpo done   • Multinodular goiter (nontoxic) 10/23/2018   • Obesity, morbid, BMI 50 or higher (CMS/HCC) 08/06/2019   • Obstructive sleep apnea syndrome 11/06/2017     Past Surgical History:   Procedure Laterality Date   • Achilles tendon surgery      12/08/2021   • Foot fracture surgery Left 2013   • Foot/toes surgery proc unlisted Left 2017    laproscopic scar tissue removal   • Open access colonoscopy  11/01/2019    normal, repeat 10 yrs     Family History:   Family History   Problem Relation Age of Onset   • Patient is unaware of any medical problems Mother    • Diabetes Father    • Hypertension Father    • Diabetes Sister    • Patient is unaware of any medical problems Brother        Allergies:  ALLERGIES:   Allergen Reactions   • Seasonal Other (See Comments)     Itchy eyes, runny nose   Itchy eyes, runny nose    • Sulfa Antibiotics Other (See Comments)     unknown  unknown       Current Outpatient Medications   Medication Sig Dispense Refill   • meloxicam (MOBIC) 15 MG tablet Take 15 mg by mouth daily as needed.     • lisinopril-hydroCHLOROthiazide (ZESTORETIC) 20-12.5 MG per tablet TAKE 1 TABLET BY MOUTH DAILY 30 tablet 3   • allopurinol (ZYLOPRIM) 100 MG tablet TAKE 1 TABLET BY MOUTH EVERY MORNING 30 tablet 2   • VITAMIN D PO      • valACYclovir (VALTREX) 500 MG tablet Take 1 tablet by mouth daily. 30 tablet 0   • Lactobacillus (PROBIOTIC ACIDOPHILUS PO)      • Multiple Vitamins-Minerals (MULTIVITAMIN ADULT PO)        No current facility-administered medications for this visit.       REVIEW OF SYSTEMS  See HPI>  No headaches, no unexplained chest pain, dyspnea, SOB, abdominal pain, bladder or  bowel complaints. Denies depression.   All other systems reviewed are negative.    OBJECTIVE  Physical Exam  /84   Pulse 88   Resp 16   Ht 5' 1\" (1.549 m)   Wt 117.3 kg (258 lb 8 oz)   LMP  11/15/2021 (Exact Date)   BMI 48.84 kg/m²   BSA 2.11 m²     CONSTITUTIONAL:    Appearance: well-nourished, well developed, alert, in no acute distress    HENT   Head: normocephalic, atraumatic   Face: face within normal limits,    Ears: external ears within normal limits   Nose: external nose normal in appearance, nares patent, nasal discharge absent   Mouth: appearance normal    CHEST   Respiratory effort: breathing unlabored      GASTROINTESTINAL   Abdominal Examination: abdomen nontender to palpation, normal bowel sounds, tone normal without rigidity   BREASTS   Inspection of Breasts: breasts symmetrical, no skin changes, no discharge present   Palpation of Breasts and Axillae:   Left breast ==> + breast tenderness more on this. mostly on the left breast in the upper outer quadrant, laterally around 1 or 2 o'clock position. + some nodular density palpated in this area as well that is tender  right breast ==>no masses present on palpation, + breast tenderness   Axillary Lymph Nodes: no lymphadenopathy present      SKIN   General Inspection: no rashes present, no lesions present, no areas of discoloration   Head and Face: no rashes present, no lesions present, no areas of discoloration, no masses present    NEUROLOGIC/PSYCHIATRIC   Orientation: grossly oriented to person, place and time   Mood and Affect: mood normal, affect appropriate   Coordination: coordination normal   Gait and Station: normal gait    ASSESSMENT  Breast pain/ left breast density==> likely breast pain is more cyclical due to onset of menses/postmenopausal bleeding. However, she does have more dense, firm areas in upper outer left breast. Plan for diagnostic neal + US left breast diagnostic   Postmenopausal bleeding==> Discussed possible etiologies of postmenopausal  Discussed evaluation and possible management options  rec labs to r/o hormone complications  rec pelvic US, sono EMB to r/o ovarian cysts, intrauterine pathology and tissue  sampling.    obesity  PLAN  As above  Pt is going to get achilles surgery soon. So eval may be after that  All questions answered      Fran repeated all of the instructions and states she understands the plan of care.   no

## 2023-01-25 NOTE — PROGRESS NOTE ADULT - ASSESSMENT
61y/o F now s/p R TCAR, CAD, HTN, h/o perforated ulcer requiring "emergent surgery", bipolar      PLAN:  - Diet: Regular   - pain control prn  - SBP goal 110-160  - monitor I&Os  - f/u AM labs, replete prn  - ASA/Plavix   - neurological checks   - dispo: dc home    Vascular surgery  [id number].   63y/o F now s/p R TCAR, CAD, HTN, h/o perforated ulcer requiring "emergent surgery", bipolar now s/p TCAR 1/24.       PLAN:  - Diet: Regular   - pain control prn  - SBP goal 110-160  - monitor I&Os  - BP soft, will give dose of sudafed per Dr. Alarcon   - f/u AM labs, replete prn  - ASA/Plavix   - neurological checks   - dispo: dc home tomorrow     Vascular surgery  [id number].   63y/o F now s/p R TCAR, CAD, HTN, h/o perforated ulcer requiring "emergent surgery", bipolar now s/p TCAR 1/24.       PLAN:  - Diet: Regular   - pain control prn  - SBP goal 110-160  - monitor I&Os  - BP soft, will give dose of sudafed per Dr. Alarcon   - f/u AM labs  - ASA/Plavix   - neurological checks   - dispo: dc home tomorrow if BP okay    Vascular surgery  [id number].

## 2023-01-26 ENCOUNTER — TRANSCRIPTION ENCOUNTER (OUTPATIENT)
Age: 63
End: 2023-01-26

## 2023-01-26 VITALS
HEART RATE: 51 BPM | TEMPERATURE: 98 F | RESPIRATION RATE: 18 BRPM | OXYGEN SATURATION: 100 % | DIASTOLIC BLOOD PRESSURE: 80 MMHG | SYSTOLIC BLOOD PRESSURE: 134 MMHG

## 2023-01-26 LAB
HCT VFR BLD CALC: 39 % — SIGNIFICANT CHANGE UP (ref 34.5–45)
HGB BLD-MCNC: 12.9 G/DL — SIGNIFICANT CHANGE UP (ref 11.5–15.5)
MCHC RBC-ENTMCNC: 32.9 PG — SIGNIFICANT CHANGE UP (ref 27–34)
MCHC RBC-ENTMCNC: 33.1 GM/DL — SIGNIFICANT CHANGE UP (ref 32–36)
MCV RBC AUTO: 99.5 FL — SIGNIFICANT CHANGE UP (ref 80–100)
NRBC # BLD: 0 /100 WBCS — SIGNIFICANT CHANGE UP (ref 0–0)
NRBC # FLD: 0 K/UL — SIGNIFICANT CHANGE UP (ref 0–0)
PLATELET # BLD AUTO: 88 K/UL — LOW (ref 150–400)
RBC # BLD: 3.92 M/UL — SIGNIFICANT CHANGE UP (ref 3.8–5.2)
RBC # FLD: 13.1 % — SIGNIFICANT CHANGE UP (ref 10.3–14.5)
WBC # BLD: 6.94 K/UL — SIGNIFICANT CHANGE UP (ref 3.8–10.5)
WBC # FLD AUTO: 6.94 K/UL — SIGNIFICANT CHANGE UP (ref 3.8–10.5)

## 2023-01-26 RX ORDER — ACETAMINOPHEN 500 MG
2 TABLET ORAL
Qty: 0 | Refills: 0 | DISCHARGE
Start: 2023-01-26

## 2023-01-26 RX ADMIN — LISINOPRIL 30 MILLIGRAM(S): 2.5 TABLET ORAL at 05:44

## 2023-01-26 RX ADMIN — CLOPIDOGREL BISULFATE 75 MILLIGRAM(S): 75 TABLET, FILM COATED ORAL at 12:49

## 2023-01-26 RX ADMIN — Medication 81 MILLIGRAM(S): at 12:49

## 2023-01-26 RX ADMIN — Medication 975 MILLIGRAM(S): at 07:20

## 2023-01-26 RX ADMIN — Medication 25 MILLIGRAM(S): at 05:44

## 2023-01-26 RX ADMIN — Medication 4 MILLIGRAM(S): at 12:49

## 2023-01-26 RX ADMIN — LORATADINE 10 MILLIGRAM(S): 10 TABLET ORAL at 12:49

## 2023-01-26 RX ADMIN — PANTOPRAZOLE SODIUM 40 MILLIGRAM(S): 20 TABLET, DELAYED RELEASE ORAL at 05:44

## 2023-01-26 RX ADMIN — Medication 21 PATCH: at 07:14

## 2023-01-26 RX ADMIN — ISOSORBIDE MONONITRATE 30 MILLIGRAM(S): 60 TABLET, EXTENDED RELEASE ORAL at 12:48

## 2023-01-26 NOTE — PROGRESS NOTE ADULT - ASSESSMENT
63y/o F now s/p R TCAR, CAD, HTN, h/o perforated ulcer requiring "emergent surgery", bipolar now s/p TCAR 1/24.     PLAN:  - Diet: Regular   - pain control prn  - SBP goal 110-160  - monitor I&Os  - ASA/Plavix   - Discharge home today     Vascular surgery  [id number].

## 2023-01-26 NOTE — DISCHARGE NOTE NURSING/CASE MANAGEMENT/SOCIAL WORK - NSDCPEFALRISK_GEN_ALL_CORE
For information on Fall & Injury Prevention, visit: [email or web address] OR  [email or web address] OR  https://www.cdc.gov/steadi/patient.html

## 2023-01-26 NOTE — DISCHARGE NOTE NURSING/CASE MANAGEMENT/SOCIAL WORK - PATIENT PORTAL LINK FT
You can access the FollowMyHealth Patient Portal offered by [facility] by registering at the following website: [email or web address]. By joining [facility]’s FollowMyHealth portal, you will also be able to view your health information using other applications (apps) compatible with our system.

## 2023-01-26 NOTE — PROGRESS NOTE ADULT - SUBJECTIVE AND OBJECTIVE BOX
Vascular Surgery Daily Progress Note  =====================================================    SUBJECTIVE: Patient seen and examined at bedside on AM rounds. Patient reports that they're feeling well. Tolerating diet, denies nausea, vomiting. OOB/Amublating as tolerated. Denies fever, chills.    --------------------------------------------------------------------------------------    MEDICATIONS:    Neurologic Medications  acetaminophen     Tablet .. 975 milliGRAM(s) Oral every 6 hours PRN Mild Pain (1 - 3)  melatonin 3 milliGRAM(s) Oral at bedtime PRN Sleep    Respiratory Medications  loratadine 10 milliGRAM(s) Oral daily    Cardiovascular Medications  isosorbide   mononitrate ER Tablet (IMDUR) 30 milliGRAM(s) Oral daily  lisinopril 30 milliGRAM(s) Oral daily  metoprolol succinate ER 25 milliGRAM(s) Oral daily    Gastrointestinal Medications  pantoprazole    Tablet 40 milliGRAM(s) Oral before breakfast    Genitourinary Medications    Hematologic/Oncologic Medications  aspirin enteric coated 81 milliGRAM(s) Oral daily  clopidogrel Tablet 75 milliGRAM(s) Oral daily  influenza   Vaccine 0.5 milliLiter(s) IntraMuscular once    Antimicrobial/Immunologic Medications    Endocrine/Metabolic Medications  atorvastatin 80 milliGRAM(s) Oral at bedtime    Topical/Other Medications  nicotine -   7 mG/24Hr(s) Patch 1 Patch Transdermal daily    --------------------------------------------------------------------------------------    VITAL SIGNS:  T(C): 36.1 (01-25-23 @ 05:08), Max: 36.6 (01-24-23 @ 23:00)  HR: 66 (01-25-23 @ 05:08) (50 - 66)  BP: 108/65 (01-25-23 @ 05:08) (105/63 - 156/84)  RR: 17 (01-25-23 @ 05:08) (12 - 22)  SpO2: 97% (01-25-23 @ 05:08) (93% - 100%)  --------------------------------------------------------------------------------------    EXAM    General: NAD, resting in bed comfortably.  HEENT: right neck incision c/d/i  Cardiac: regular rate, warm and well perfused  Respiratory: Nonlabored respirations, normal cw expansion.  Abdomen: soft, nontender, nondistended.   Extremities: normal strength, left groin site c/d/i    --------------------------------------------------------------------------------------
TEAM [ C ] Surgery Daily Progress Note  =====================================================    SUBJECTIVE: Patient seen and examined at bedside on AM rounds. Patient reports that they're feeling well. Denies fever, chills, N/V, chest pain, SOB    ALLERGIES:  Brilinta (Short breath)  Gluten (Vomiting)  morphine (Nausea)  morphine (Vomiting)  Nuts (Unknown)  peanuts (Hives; Anaphylaxis)  peanuts (Rash)      --------------------------------------------------------------------------------------    MEDICATIONS:    Neurologic Medications  acetaminophen     Tablet .. 975 milliGRAM(s) Oral every 6 hours PRN Mild Pain (1 - 3)  melatonin 3 milliGRAM(s) Oral at bedtime PRN Sleep    Respiratory Medications  loratadine 10 milliGRAM(s) Oral daily    Cardiovascular Medications  isosorbide   mononitrate ER Tablet (IMDUR) 30 milliGRAM(s) Oral daily  lisinopril 30 milliGRAM(s) Oral daily  metoprolol succinate ER 25 milliGRAM(s) Oral daily    Gastrointestinal Medications  pantoprazole    Tablet 40 milliGRAM(s) Oral before breakfast    Genitourinary Medications    Hematologic/Oncologic Medications  aspirin enteric coated 81 milliGRAM(s) Oral daily  clopidogrel Tablet 75 milliGRAM(s) Oral daily  influenza   Vaccine 0.5 milliLiter(s) IntraMuscular once    Antimicrobial/Immunologic Medications    Endocrine/Metabolic Medications  atorvastatin 80 milliGRAM(s) Oral at bedtime    Topical/Other Medications  nicotine  Polacrilex Gum 4 milliGRAM(s) Oral daily  nicotine - 21 mG/24Hr(s) Patch 21 Patch Transdermal every 24 hours    --------------------------------------------------------------------------------------    VITAL SIGNS:  ICU Vital Signs Last 24 Hrs  T(C): 36.4 (26 Jan 2023 04:17), Max: 37.1 (25 Jan 2023 10:34)  T(F): 97.5 (26 Jan 2023 04:17), Max: 98.7 (25 Jan 2023 10:34)  HR: 51 (26 Jan 2023 04:17) (51 - 65)  BP: 134/80 (26 Jan 2023 04:17) (106/59 - 143/75)  BP(mean): --  ABP: --  ABP(mean): --  RR: 18 (26 Jan 2023 04:17) (16 - 18)  SpO2: 100% (26 Jan 2023 04:17) (100% - 100%)    O2 Parameters below as of 26 Jan 2023 04:17  Patient On (Oxygen Delivery Method): room air  --------------------------------------------------------------------------------------    EXAM    General: NAD, resting in bed comfortably.  Cardiac: regular rate, warm and well perfused  Respiratory: Nonlabored respirations, normal cw expansion.  Abdomen: soft, nontender, nondistended  Extremities: No L groin hematoma  --------------------------------------------------------------------------------------    LABS                        12.2   8.46  )-----------( 95       ( 25 Jan 2023 05:51 )             37.1   01-25    140  |  107  |  9   ----------------------------<  89  3.8   |  22  |  0.75    Ca    8.9      25 Jan 2023 05:51  Phos  4.0     01-25  Mg     1.90     01-25    --------------------------------------------------------------------------------------    INS AND OUTS:  I&O's Detail    25 Jan 2023 07:01  -  26 Jan 2023 07:00  --------------------------------------------------------  IN:    Oral Fluid: 720 mL  Total IN: 720 mL    OUT:  Total OUT: 0 mL    Total NET: 720 mL  --------------------------------------------------------------------------------------

## 2023-01-31 PROBLEM — L40.9 PSORIASIS, UNSPECIFIED: Chronic | Status: ACTIVE | Noted: 2023-01-17

## 2023-02-08 NOTE — HISTORY OF PRESENT ILLNESS
[FreeTextEntry1] : ARABELLA BOWDEN (: Dec 31 1960) is a 62 year old female with carotid artery disease. She is s/p R TCAR on 2023 for asymptomatic high grade stenosis. Uncomplicated post-op course, patient was discharged on POD 2.\par \par No prior history of stroke or TIA. No focal neurologic deficits including amaurosis fugax, slurred speech, and weakness in the arms/legs. She is on aspirin 81 mg, plavix and rosuvastatin. \par \par PMH: HTN, HLD, CAD s/p multiple cardiac stents, occasional KOEHLER, current every day smoker (4-5 cigarettes, trying to quit)\par \par Carotid duplex - R ICA (361/157), L ICA (199/74), bilaterally antegrade vertebrals\par \par \par \par \par Patient reports multiple medication/food allergies and intolerances, noted below:\par Brilinta - was taking it for CAD s/p multiple coronary artery stents, discontinued due to dyspnea after several days of taking the medication\par Morphine - nausea (intolerance) but does well when given anti-emetic\par gluten - h/o celiac disease\par peanuts - anaphylaxis\par \par

## 2023-02-08 NOTE — ASSESSMENT
[FreeTextEntry1] : A/P \par \par Patient with history of bilateral carotid artery disease. She is doing well s/p R TCAR. Continue with aspirin, plavix and atorvastatin. Return to office in 1 month for bilateral carotid duplex and office visit.

## 2023-02-09 ENCOUNTER — APPOINTMENT (OUTPATIENT)
Dept: VASCULAR SURGERY | Facility: CLINIC | Age: 63
End: 2023-02-09

## 2023-02-14 NOTE — PATIENT PROFILE ADULT - DO YOU LACK THE NECESSARY SUPPORT TO HELP YOU COPE WITH LIFE CHALLENGES?
Physical Therapy    Visit Type: initial evaluation and treatment  Precautions:  Medical precautions:  fall risk;. 2/12/23: patient admitted from outside hospital with periprosthetic femur fracture left leg after fall.  2/13: Open treatment left distal femoral supracondylar periprosthetic fracture     During treatment session therapist and PT student was wearing mask and patient was wearing mask    Lines:     Basic: IV and continuous pulse oximetry (PCA, capnography)      Lines in chart and on patient reviewed, precautions maintained throughout session.  Lower Extremity:    Left:  weight bearing: partial (75%).    TROM brace- unlimited range of motion OK, ok for pt to lock to assist in WB  Hearing: no hearing deficits  Vision:     Current vision: no visual deficits  Safety Measures: bed alarm and bed rails  SUBJECTIVE  Patient agreed to participate in therapy this date.  Patient verbally agrees to allow the following to be present during session: student and spouse  \"My pain is an 8\"    Prior Living Situation  Information Provided By:: pt  Type of Home: Apartment  Home Layout: One level  # Steps to Enter: 0  # Steps in the Home: 0  Lives With: Spouse (spouse retired)  Transportation: Drives  Bathroom Shower/Tub: Tub/Shower unit  Bathroom Toilet: Standard  Bathroom Equipment: Tub transfer bench  Home Equipment: Cane (knee brace)  Additional Comments: drives self to HD    Prior Communication/Cognition  Prior Cognition: Intact    Prior Function  Prior ADL: Modified Independent  Eating: Independent  Grooming: Independent  Bathing: Independent  Upper Body Dressing: Independent  Lower Body Dressing: Independent  Toileting: Independent  Bed Mobility: Independent  Transfers: Modified Independent  Toilet Transfers: Modified Independent  Tub/Shower Transfers: Modified Independent  Ambulation in the Home: Modified  Independent  Ambulation in the Community: Modified Independent  Steps into the Home: Modified Independent  Steps  within the Home: Modified Independent  Car Transfers: Modified Independent  Hearing: Hard of hearing  Vocational: On disability    Patient / Family Goal: return to previous functional status, maximize function and return home    Pain     Location: L knee    At onset of session (out of 10): 8  RN informed on pain level     OBJECTIVE     Cognitive Status   Level of Consciousness   - alert  Affect/Behavior    - calm and cooperative  Orientation    - Oriented to: person, place, time and situation  Functional Communication   - Overall Status: within functional limits     Range of Motion (ROM)   (degrees unless noted; active unless noted; norms in ( ); negative=lacking to 0, positive=beyond 0)  WFL: LLE, RLE, except as noted  Comments: TROM brace limiting motion on left lower extremity, at least 20 degrees flexion when unlocked.     Strength  (out of 5 unless noted, standard test position unless noted)   WFL: right knee, except as noted  Comments / Details: left knee and hip not tested due to immobilizer, ankle WFL      Sitting Balance  (TAMAR = base of support)  Static      - Trial 1 details: stand by assist, with back unsupported, with double UE support and with double LE support    Standing Balance  (TAMAR = base of support)  Firm Surface: Double Leg      - Static, Eyes Open       - Trial 1 details: with double UE support and contact guard      Sensation/Dermatome Testing:    Intact: RLE light touch, LLE light touch    Bed Mobility  - Supine to sit: stand by assist, with verbal cues  - Sit to supine: with verbal cues, stand by assist  Transfers  Assistive devices: 2-wheeled walker, gait belt  - Sit to stand: with verbal cues, contact guard/touching/steadying assist, with tactile cues  - Stand to sit: with verbal cues, contact guard/touching/steadying assist, with tactile cues    Ambulation / Gait  - Assistive device: gait belt and two-wheeled walker  - Distance (feet unless otherwise indicated): 40, 10  - Assist Level:  with verbal cues  - Surface: even  - Description: decreased daya/pace, step through, decreased step length left and decreased stance time LLE     Interventions     Training provided: activity tolerance, balance retraining, bed mobility training, body mechanics, breathing/relaxation, compensatory techniques, gait training, positioning, safety training, transfer training, prosthetic/orthotic training and use of assistive device    Skilled input: Verbal instruction/cues, tactile instruction/cues, posture correction and facilitation  Verbal Consent: Writer verbally educated and received verbal consent for hand placement, positioning of patient, and techniques to be performed today from patient for clothing adjustments for techniques, hand placement and palpation for techniques and therapist position for techniques as described above and how they are pertinent to the patient's plan of care.         ASSESSMENT   Impairments: balance, activity tolerance, strength and pain  Functional Limitations: stair climbing and ambulation  Pt tolerated session fairly well. Pt complained of 8/10 however did not increase during mobility. Pt was able to state weightbearing status and was able to respond well with cues. Pt still is at a contact guard level during mobility with use of wheeled walker and at below baseline status as he used a cane prior to surgery. He is aware of need for walker at this time due to weight bearing status. Pt will continue acute PT to address functional deficits for safe return home as planned.    Discharge Recommendations  Recommendation for Discharge: PT IL: Patient requires 24 HOUR assistance to perform mobility and/or ADLs safely, Patient is appropriate for Physical Therapy 1-3 times per week  PT/OT Mobility Equipment for Discharge: RW  PT/OT ADL Equipment for Discharge: RTS, LH AE    Therapy Diagnosis:  Other Abnormalities of Gait and Mobility      • Skilled therapy is required to address these  limitations in attempt to maximize the patient's independence.     • Predicted patient presentation: Moderate (evolving) - Patient comorbidities and complexities, as defined above, may have varying impact on steady progress for prescribed plan of care.    Education:   - Present and ready to learn: patient  Education provided during session:  - Results of above outlined education: Verbalizes understanding and Demonstrates understanding    Patient at End of Session:   Location: in chair  Safety measures: alarm system in place/re-engaged  Handoff to: nurse      I was in the immediate presence of the student and directed the student’s performance of the services. I am responsible for all treatment, assessment, documentation, and billing rendered for this patient.  Rosibel Munoz, PT        PLAN   Suggestions for next session as indicated: Continue PT to address functional mobility deficits    PT Frequency: 3-5 x per week  A minimum of 8 minutes per session x 1 week in the acute setting.    Interventions: strengthening, safety education, gait training, energy conservation, balance, body mechanics, bed mobility and functional transfer training  Agreement to plan and goals: patient agrees with goals and treatment plan        GOALS  Long Term Goals: (to be met by time of discharge from hospital)  Sit to supine: Patient will complete sit to supine supervision.  Supine to sit: Patient will complete supine to sit supervision.  Sit to stand: Patient will complete sit to stand transfer with gait belt and 2-wheeled walker, supervision.   Stand to sit: Patient will complete stand to sit transfer with gait belt and 2-wheeled walker, supervision.   Ambulation (even): Patient will ambulate on even surface for 50 feet with gait belt and 2-wheeled walker, supervision.     Documented in the chart in the following areas: Prior Level of Function. Assessment.      Therapy procedure time and total treatment time can be found documented  on the Time Entry flowsheet   no
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
## 2023-03-28 LAB — PA ADP PRP-ACNC: 54 PRU

## 2023-03-30 ENCOUNTER — NON-APPOINTMENT (OUTPATIENT)
Age: 63
End: 2023-03-30

## 2023-04-11 NOTE — DISCHARGE NOTE PROVIDER - NSDCMRMEDTOKEN_GEN_ALL_CORE_FT
aspirin 81 mg oral delayed release tablet: 1 tab(s) orally once a day  atorvastatin 80 mg oral tablet: 1 tab(s) orally once a day (at bedtime)  lisinopril 20 mg oral tablet: 1 tab(s) orally once a day   Metoprolol Succinate ER 25 mg oral tablet, extended release: 1 tab(s) orally once a day   pantoprazole 40 mg oral delayed release tablet: 1 tab(s) orally once a day (before a meal)  
coffee

## 2023-04-14 ENCOUNTER — INPATIENT (INPATIENT)
Facility: HOSPITAL | Age: 63
LOS: 3 days | Discharge: HOME | End: 2023-04-18
Attending: HOSPITALIST | Admitting: HOSPITALIST
Payer: MEDICAID

## 2023-04-14 VITALS
OXYGEN SATURATION: 99 % | DIASTOLIC BLOOD PRESSURE: 77 MMHG | TEMPERATURE: 98 F | RESPIRATION RATE: 18 BRPM | HEIGHT: 64 IN | SYSTOLIC BLOOD PRESSURE: 126 MMHG | WEIGHT: 141.98 LBS | HEART RATE: 56 BPM

## 2023-04-14 DIAGNOSIS — K27.5 CHRONIC OR UNSPECIFIED PEPTIC ULCER, SITE UNSPECIFIED, WITH PERFORATION: Chronic | ICD-10-CM

## 2023-04-14 DIAGNOSIS — Z95.5 PRESENCE OF CORONARY ANGIOPLASTY IMPLANT AND GRAFT: Chronic | ICD-10-CM

## 2023-04-14 DIAGNOSIS — Z98.890 OTHER SPECIFIED POSTPROCEDURAL STATES: Chronic | ICD-10-CM

## 2023-04-14 DIAGNOSIS — Z98.62 PERIPHERAL VASCULAR ANGIOPLASTY STATUS: Chronic | ICD-10-CM

## 2023-04-14 DIAGNOSIS — Z87.42 PERSONAL HISTORY OF OTHER DISEASES OF THE FEMALE GENITAL TRACT: Chronic | ICD-10-CM

## 2023-04-14 DIAGNOSIS — Z90.49 ACQUIRED ABSENCE OF OTHER SPECIFIED PARTS OF DIGESTIVE TRACT: Chronic | ICD-10-CM

## 2023-04-14 DIAGNOSIS — D21.9 BENIGN NEOPLASM OF CONNECTIVE AND OTHER SOFT TISSUE, UNSPECIFIED: Chronic | ICD-10-CM

## 2023-04-14 LAB
ALBUMIN SERPL ELPH-MCNC: 3.8 G/DL — SIGNIFICANT CHANGE UP (ref 3.3–5)
ALP SERPL-CCNC: 83 U/L — SIGNIFICANT CHANGE UP (ref 40–120)
ALT FLD-CCNC: 21 U/L — SIGNIFICANT CHANGE UP (ref 12–78)
ANION GAP SERPL CALC-SCNC: 7 MMOL/L — SIGNIFICANT CHANGE UP (ref 5–17)
AST SERPL-CCNC: 18 U/L — SIGNIFICANT CHANGE UP (ref 15–37)
BASOPHILS # BLD AUTO: 0.05 K/UL — SIGNIFICANT CHANGE UP (ref 0–0.2)
BASOPHILS NFR BLD AUTO: 0.3 % — SIGNIFICANT CHANGE UP (ref 0–2)
BILIRUB SERPL-MCNC: 0.2 MG/DL — SIGNIFICANT CHANGE UP (ref 0.2–1.2)
BUN SERPL-MCNC: 25 MG/DL — HIGH (ref 7–23)
CALCIUM SERPL-MCNC: 9.1 MG/DL — SIGNIFICANT CHANGE UP (ref 8.5–10.1)
CHLORIDE SERPL-SCNC: 107 MMOL/L — SIGNIFICANT CHANGE UP (ref 96–108)
CO2 SERPL-SCNC: 23 MMOL/L — SIGNIFICANT CHANGE UP (ref 22–31)
CREAT SERPL-MCNC: 1.15 MG/DL — SIGNIFICANT CHANGE UP (ref 0.5–1.3)
D DIMER BLD IA.RAPID-MCNC: 453 NG/ML DDU — HIGH
EGFR: 54 ML/MIN/1.73M2 — LOW
EOSINOPHIL # BLD AUTO: 0.15 K/UL — SIGNIFICANT CHANGE UP (ref 0–0.5)
EOSINOPHIL NFR BLD AUTO: 1 % — SIGNIFICANT CHANGE UP (ref 0–6)
GLUCOSE SERPL-MCNC: 119 MG/DL — HIGH (ref 70–99)
HCT VFR BLD CALC: 40.5 % — SIGNIFICANT CHANGE UP (ref 34.5–45)
HGB BLD-MCNC: 13.9 G/DL — SIGNIFICANT CHANGE UP (ref 11.5–15.5)
IMM GRANULOCYTES NFR BLD AUTO: 0.3 % — SIGNIFICANT CHANGE UP (ref 0–0.9)
LYMPHOCYTES # BLD AUTO: 2.89 K/UL — SIGNIFICANT CHANGE UP (ref 1–3.3)
LYMPHOCYTES # BLD AUTO: 20 % — SIGNIFICANT CHANGE UP (ref 13–44)
MCHC RBC-ENTMCNC: 33 PG — SIGNIFICANT CHANGE UP (ref 27–34)
MCHC RBC-ENTMCNC: 34.3 G/DL — SIGNIFICANT CHANGE UP (ref 32–36)
MCV RBC AUTO: 96.2 FL — SIGNIFICANT CHANGE UP (ref 80–100)
MONOCYTES # BLD AUTO: 0.75 K/UL — SIGNIFICANT CHANGE UP (ref 0–0.9)
MONOCYTES NFR BLD AUTO: 5.2 % — SIGNIFICANT CHANGE UP (ref 2–14)
NEUTROPHILS # BLD AUTO: 10.57 K/UL — HIGH (ref 1.8–7.4)
NEUTROPHILS NFR BLD AUTO: 73.2 % — SIGNIFICANT CHANGE UP (ref 43–77)
NRBC # BLD: 0 /100 WBCS — SIGNIFICANT CHANGE UP (ref 0–0)
NT-PROBNP SERPL-SCNC: 84 PG/ML — SIGNIFICANT CHANGE UP (ref 0–125)
PLATELET # BLD AUTO: 143 K/UL — LOW (ref 150–400)
POTASSIUM SERPL-MCNC: 3.9 MMOL/L — SIGNIFICANT CHANGE UP (ref 3.5–5.3)
POTASSIUM SERPL-SCNC: 3.9 MMOL/L — SIGNIFICANT CHANGE UP (ref 3.5–5.3)
PROT SERPL-MCNC: 7 GM/DL — SIGNIFICANT CHANGE UP (ref 6–8.3)
RBC # BLD: 4.21 M/UL — SIGNIFICANT CHANGE UP (ref 3.8–5.2)
RBC # FLD: 12.5 % — SIGNIFICANT CHANGE UP (ref 10.3–14.5)
SODIUM SERPL-SCNC: 137 MMOL/L — SIGNIFICANT CHANGE UP (ref 135–145)
TROPONIN I, HIGH SENSITIVITY RESULT: 17.6 NG/L — SIGNIFICANT CHANGE UP
WBC # BLD: 14.45 K/UL — HIGH (ref 3.8–10.5)
WBC # FLD AUTO: 14.45 K/UL — HIGH (ref 3.8–10.5)

## 2023-04-14 PROCEDURE — 93010 ELECTROCARDIOGRAM REPORT: CPT

## 2023-04-14 PROCEDURE — 99285 EMERGENCY DEPT VISIT HI MDM: CPT

## 2023-04-14 PROCEDURE — 71045 X-RAY EXAM CHEST 1 VIEW: CPT | Mod: 26

## 2023-04-14 RX ADMIN — Medication 1 MILLIGRAM(S): at 21:48

## 2023-04-14 NOTE — ED PROVIDER NOTE - PHYSICAL EXAMINATION
GENERAL: Awake, alert, NAD  HEENT: NC/AT, moist mucous membranes  LUNGS: CTAB, no wheezes or crackles  CARDIAC: RRR, no m/r/g  ABDOMEN: Soft, normal BS, non tender, non distended, no rebound, no guarding  BACK: No midline spinal tenderness, no CVA tenderness  EXT: No edema, no calf tenderness, 2+ DP pulses bilaterally, no deformities.  NEURO: A&Ox3. Moving all extremities.  SKIN: Warm and dry. No rash.  PSYCH: +mildly anxious

## 2023-04-14 NOTE — ED PROVIDER NOTE - OBJECTIVE STATEMENT
61 y/o F with PMH HTN, HLD, CAD s/p multiple stents, carotid stenosis s/p R TCAR, PTSD, bipolar disorder, anxiety, presents to the ED s/p syncopal episode. Patient states she had a bowel movement and afterwards had syncopal episode where she woke up on the floor. She does not remember how the episode happened. She does endorse having some chest discomfort over the past week, states it could be MSK related because she was doing gardening and thinks she over exerted herself. She denies difficulty breathing, N/V, fever, or chills.

## 2023-04-14 NOTE — ED ADULT TRIAGE NOTE - CHIEF COMPLAINT QUOTE
Patient BIB [facility] c/o mid sternal chest pain x 1 week. Denies n/v. Patient has herniated disc and was given 6.5mg morphine and 6.5mg zofran due to the pain and transporting to ER. PMH 4 cardiac stents, htn. EMS placed 20g left hand

## 2023-04-14 NOTE — ED PROVIDER NOTE - CCCP TRG CHIEF CMPLNT
TVP rate lowered from VVI 50 to VVI 40 bpm.  Currently in NSR 70. Sensing appropriately.  
chest pain

## 2023-04-14 NOTE — ED PROVIDER NOTE - CLINICAL SUMMARY MEDICAL DECISION MAKING FREE TEXT BOX
63 y/o F with PMH HTN, HLD, CAD s/p multiple stents, carotid stenosis s/p R TCAR, PTSD, bipolar disorder, anxiety, presents to the ED s/p syncopal episode.  Vitals stable.  Her EKG has new ST depression in the inferior-lateral leads.  She reports intermittent chest pain, non currently.  In setting of syncope and significant cardiac history, obtain labs, cxr  Will likely require admission given EKG changes

## 2023-04-14 NOTE — ED ADULT NURSE NOTE - OBJECTIVE STATEMENT
AAOx3 Patient BIB[facility] c/o mid sternal chest pain, generalized weakness, dizziness, SOB and syncopal episode after a "tough" bowel movement. Denies unknown head trauma, N/V. Pt speech clear. No unilateral weakness. Pt breathing spontaneous and unlabored. Pt speaks in full sentences. PMH: 4 cardiac stents, recent carotid stent placed (January 2023), pt takes Plavix,

## 2023-04-14 NOTE — ED ADULT NURSE NOTE - NSIMPLEMENTINTERV_GEN_ALL_ED
Implemented All Fall with Harm Risk Interventions:  [place] to call system. Call bell, personal items and telephone within reach. Instruct patient to call for assistance. Room bathroom lighting operational. Non-slip footwear when patient is off stretcher. Physically safe environment: no spills, clutter or unnecessary equipment. Stretcher in lowest position, wheels locked, appropriate side rails in place. Provide visual cue, wrist band, yellow gown, etc. Monitor gait and stability. Monitor for mental status changes and reorient to person, place, and time. Review medications for side effects contributing to fall risk. Reinforce activity limits and safety measures with patient and family. Provide visual clues: red socks.

## 2023-04-15 LAB
CHOLEST SERPL-MCNC: 167 MG/DL — SIGNIFICANT CHANGE UP
FLUAV AG NPH QL: SIGNIFICANT CHANGE UP
FLUBV AG NPH QL: SIGNIFICANT CHANGE UP
HCT VFR BLD CALC: 42 % — SIGNIFICANT CHANGE UP (ref 34.5–45)
HDLC SERPL-MCNC: 52 MG/DL — SIGNIFICANT CHANGE UP
HGB BLD-MCNC: 13.6 G/DL — SIGNIFICANT CHANGE UP (ref 11.5–15.5)
LIPID PNL WITH DIRECT LDL SERPL: 97 MG/DL — SIGNIFICANT CHANGE UP
MCHC RBC-ENTMCNC: 32.4 G/DL — SIGNIFICANT CHANGE UP (ref 32–36)
MCHC RBC-ENTMCNC: 32.6 PG — SIGNIFICANT CHANGE UP (ref 27–34)
MCV RBC AUTO: 100.7 FL — HIGH (ref 80–100)
NON HDL CHOLESTEROL: 115 MG/DL — SIGNIFICANT CHANGE UP
NRBC # BLD: 0 /100 WBCS — SIGNIFICANT CHANGE UP (ref 0–0)
PLATELET # BLD AUTO: 119 K/UL — LOW (ref 150–400)
RBC # BLD: 4.17 M/UL — SIGNIFICANT CHANGE UP (ref 3.8–5.2)
RBC # FLD: 12.5 % — SIGNIFICANT CHANGE UP (ref 10.3–14.5)
SARS-COV-2 RNA SPEC QL NAA+PROBE: SIGNIFICANT CHANGE UP
TRIGL SERPL-MCNC: 88 MG/DL — SIGNIFICANT CHANGE UP
TROPONIN I, HIGH SENSITIVITY RESULT: 14.5 NG/L — SIGNIFICANT CHANGE UP
TROPONIN I, HIGH SENSITIVITY RESULT: 17.1 NG/L — SIGNIFICANT CHANGE UP
WBC # BLD: 9.2 K/UL — SIGNIFICANT CHANGE UP (ref 3.8–10.5)
WBC # FLD AUTO: 9.2 K/UL — SIGNIFICANT CHANGE UP (ref 3.8–10.5)

## 2023-04-15 PROCEDURE — 93880 EXTRACRANIAL BILAT STUDY: CPT | Mod: 26

## 2023-04-15 PROCEDURE — 99223 1ST HOSP IP/OBS HIGH 75: CPT

## 2023-04-15 PROCEDURE — 71275 CT ANGIOGRAPHY CHEST: CPT | Mod: 26,MA

## 2023-04-15 PROCEDURE — 93306 TTE W/DOPPLER COMPLETE: CPT | Mod: 26

## 2023-04-15 PROCEDURE — 99233 SBSQ HOSP IP/OBS HIGH 50: CPT

## 2023-04-15 RX ORDER — ASPIRIN/CALCIUM CARB/MAGNESIUM 324 MG
81 TABLET ORAL DAILY
Refills: 0 | Status: DISCONTINUED | OUTPATIENT
Start: 2023-04-15 | End: 2023-04-18

## 2023-04-15 RX ORDER — NICOTINE POLACRILEX 2 MG
1 GUM BUCCAL DAILY
Refills: 0 | Status: DISCONTINUED | OUTPATIENT
Start: 2023-04-15 | End: 2023-04-18

## 2023-04-15 RX ORDER — METOPROLOL TARTRATE 50 MG
25 TABLET ORAL DAILY
Refills: 0 | Status: DISCONTINUED | OUTPATIENT
Start: 2023-04-15 | End: 2023-04-18

## 2023-04-15 RX ORDER — ACETAMINOPHEN 500 MG
650 TABLET ORAL EVERY 6 HOURS
Refills: 0 | Status: DISCONTINUED | OUTPATIENT
Start: 2023-04-15 | End: 2023-04-18

## 2023-04-15 RX ORDER — ONDANSETRON 8 MG/1
4 TABLET, FILM COATED ORAL EVERY 8 HOURS
Refills: 0 | Status: DISCONTINUED | OUTPATIENT
Start: 2023-04-15 | End: 2023-04-18

## 2023-04-15 RX ORDER — PANTOPRAZOLE SODIUM 20 MG/1
40 TABLET, DELAYED RELEASE ORAL
Refills: 0 | Status: DISCONTINUED | OUTPATIENT
Start: 2023-04-15 | End: 2023-04-18

## 2023-04-15 RX ORDER — ASPIRIN/CALCIUM CARB/MAGNESIUM 324 MG
324 TABLET ORAL ONCE
Refills: 0 | Status: COMPLETED | OUTPATIENT
Start: 2023-04-15 | End: 2023-04-15

## 2023-04-15 RX ORDER — NITROGLYCERIN 6.5 MG
0.4 CAPSULE, EXTENDED RELEASE ORAL
Refills: 0 | Status: DISCONTINUED | OUTPATIENT
Start: 2023-04-15 | End: 2023-04-18

## 2023-04-15 RX ORDER — ENOXAPARIN SODIUM 100 MG/ML
40 INJECTION SUBCUTANEOUS EVERY 24 HOURS
Refills: 0 | Status: DISCONTINUED | OUTPATIENT
Start: 2023-04-15 | End: 2023-04-18

## 2023-04-15 RX ORDER — ATORVASTATIN CALCIUM 80 MG/1
80 TABLET, FILM COATED ORAL AT BEDTIME
Refills: 0 | Status: DISCONTINUED | OUTPATIENT
Start: 2023-04-15 | End: 2023-04-18

## 2023-04-15 RX ORDER — CLOPIDOGREL BISULFATE 75 MG/1
75 TABLET, FILM COATED ORAL DAILY
Refills: 0 | Status: DISCONTINUED | OUTPATIENT
Start: 2023-04-15 | End: 2023-04-18

## 2023-04-15 RX ORDER — IBUPROFEN 200 MG
400 TABLET ORAL ONCE
Refills: 0 | Status: COMPLETED | OUTPATIENT
Start: 2023-04-15 | End: 2023-04-15

## 2023-04-15 RX ORDER — ISOSORBIDE MONONITRATE 60 MG/1
30 TABLET, EXTENDED RELEASE ORAL DAILY
Refills: 0 | Status: DISCONTINUED | OUTPATIENT
Start: 2023-04-15 | End: 2023-04-18

## 2023-04-15 RX ORDER — LANOLIN ALCOHOL/MO/W.PET/CERES
3 CREAM (GRAM) TOPICAL AT BEDTIME
Refills: 0 | Status: DISCONTINUED | OUTPATIENT
Start: 2023-04-15 | End: 2023-04-18

## 2023-04-15 RX ORDER — LISINOPRIL 2.5 MG/1
20 TABLET ORAL DAILY
Refills: 0 | Status: DISCONTINUED | OUTPATIENT
Start: 2023-04-15 | End: 2023-04-18

## 2023-04-15 RX ADMIN — PANTOPRAZOLE SODIUM 40 MILLIGRAM(S): 20 TABLET, DELAYED RELEASE ORAL at 07:37

## 2023-04-15 RX ADMIN — Medication 324 MILLIGRAM(S): at 02:36

## 2023-04-15 RX ADMIN — LISINOPRIL 20 MILLIGRAM(S): 2.5 TABLET ORAL at 07:36

## 2023-04-15 RX ADMIN — ISOSORBIDE MONONITRATE 30 MILLIGRAM(S): 60 TABLET, EXTENDED RELEASE ORAL at 12:16

## 2023-04-15 RX ADMIN — Medication 1 PATCH: at 19:50

## 2023-04-15 RX ADMIN — ENOXAPARIN SODIUM 40 MILLIGRAM(S): 100 INJECTION SUBCUTANEOUS at 07:02

## 2023-04-15 RX ADMIN — CLOPIDOGREL BISULFATE 75 MILLIGRAM(S): 75 TABLET, FILM COATED ORAL at 12:16

## 2023-04-15 RX ADMIN — Medication 3 MILLIGRAM(S): at 20:18

## 2023-04-15 RX ADMIN — Medication 400 MILLIGRAM(S): at 20:17

## 2023-04-15 RX ADMIN — Medication 1 PATCH: at 12:17

## 2023-04-15 RX ADMIN — Medication 400 MILLIGRAM(S): at 21:15

## 2023-04-15 RX ADMIN — Medication 81 MILLIGRAM(S): at 12:16

## 2023-04-15 RX ADMIN — ATORVASTATIN CALCIUM 80 MILLIGRAM(S): 80 TABLET, FILM COATED ORAL at 21:04

## 2023-04-15 RX ADMIN — Medication 25 MILLIGRAM(S): at 07:37

## 2023-04-15 NOTE — H&P ADULT - HISTORY OF PRESENT ILLNESS
Chief Complaint: chest pain.    The patient is a 62y Female with significant PMH of HTN, HLD, CAD s/p multiple stents, right carotid stenosis s/p R TCAR, PTSD, bipolar disorder, chronic thrombocytopenia, anxiety and others presented to the ED s/p syncopal episode and chest pain for further evaluation and management.  She has some chest pain with discomfort for last few days, but yesterday, she had a bowel movement and afterwards had syncopal episode where she woke up and found herself on the floor. She does not remember how the episode happened. Sometimes, she also feels pain to her left neck. She also has more burping, and thinks that it has been causing chest discomfort. Otherwise, she denies fever, chills, sob, palpitation, N/V, abdominal pain, diarrhea or dysuria.     Significant labs: WBC 14.45k, Hb 13.9, Platelets 145k,  Troponin 17.6->17.1, BNP 84  D-dimer elevated at 453, but CTA chest negative for PE.    EKD: 75 bmp in SR with some ST depression in II, III, aVF, V5-6 leads, and this ST depression is new compared to her prior EKG.     mg and Ativan 1 mg IV given in ED.   Chief Complaint: chest pain.    The patient is a 62y Female with significant PMH of HTN, HLD, CAD s/p multiple stents, right carotid stenosis s/p R TCAR, PTSD, bipolar disorder, chronic thrombocytopenia, anxiety and others presented to the ED s/p syncopal episode and chest pain for further evaluation and management.  She has some chest pain with discomfort for last few days, but yesterday, she had a bowel movement and afterwards had syncopal episode where she woke up and found herself on the floor. She does not remember how the episode happened. Sometimes, she also feels pain to her left neck. She also has more burping, and thinks that it has been causing chest discomfort. Otherwise, she denies fever, chills, sob, palpitation, N/V, abdominal pain, diarrhea or dysuria. She continues to smoke few cig daily, and drinks alcohol intermittently. She also uses medical marijuana.    Significant labs: WBC 14.45k, Hb 13.9, Platelets 145k,  Troponin 17.6->17.1, BNP 84  D-dimer elevated at 453, but CTA chest negative for PE.    EKD: 75 bmp in SR with some ST depression in II, III, aVF, V5-6 leads, and this ST depression is new compared to her prior EKG.     mg and Ativan 1 mg IV given in ED.

## 2023-04-15 NOTE — H&P ADULT - NSICDXPASTMEDICALHX_GEN_ALL_CORE_FT
PAST MEDICAL HISTORY:  Anxiety     Bipolar 1 disorder resolved per patient    CAD (coronary artery disease)     Heart disease     History of breast cancer     History of herniated intervertebral disc     Hyperlipidemia     Psoriasis     PTSD (post-traumatic stress disorder)     Right carotid artery occlusion     Secondary hypertension      PAST MEDICAL HISTORY:  Anxiety     Bipolar 1 disorder resolved per patient    CAD (coronary artery disease)     H/O thrombocytopenia     Heart disease     History of breast cancer     History of herniated intervertebral disc     Hyperlipidemia     Psoriasis     PTSD (post-traumatic stress disorder)     Right carotid artery occlusion     Secondary hypertension

## 2023-04-15 NOTE — H&P ADULT - ASSESSMENT
The patient is a 62y Female with significant PMH of HTN, HLD, CAD s/p multiple stents, right carotid stenosis s/p R TCAR, PTSD, bipolar disorder, chronic thrombocytopenia, anxiety and others presented to the ED s/p syncopal episode and chest pain for further evaluation and management.  She has some chest pain with discomfort for last few days, but yesterday, she had a bowel movement and afterwards had syncopal episode where she woke up and found herself on the floor. She does not remember how the episode happened. Sometimes, she also feels pain to her left neck. She also has more burping, and thinks that it has been causing chest discomfort. Otherwise, she denies fever, chills, sob, palpitation, N/V, abdominal pain, diarrhea or dysuria.     Significant labs: WBC 14.45k, Hb 13.9, Platelets 145k,  Troponin 17.6->17.1, BNP 84  D-dimer elevated at 453, but CTA chest negative for PE.    EKD: 75 bmp in SR with some ST depression in II, III, aVF, V5-6 leads, and this ST depression is new compared to her prior EKG.     mg and Ativan 1 mg IV given in ED.      # Chest pain and syncope with ST depression in inferolateral leads.  Troponin x2 negative. BNP 84  Admit to telemetry.  2D Echo and Carotid doppler in am.  SL nitro prn.  Continue her ASA, Plavix and Statin.  Consult cardiology in am.    # Elevated D-dimer.  CTA chest negative for PE or any infiltrates.    # Leukocytosis, likely reactive.  Afebrile.  Will repeat CBC in am.    # HTN, CAD, h/o cardiac stent, h/o right carotid stent and HPL.  Continue her ASA, Plavix, Toprol-XL, Zestril, Imdur and Crestor.   The patient is a 62y Female with significant PMH of HTN, HLD, CAD s/p multiple stents, right carotid stenosis s/p R TCAR, PTSD, bipolar disorder, chronic thrombocytopenia, anxiety and others presented to the ED s/p syncopal episode and chest pain for further evaluation and management.  She has some chest pain with discomfort for last few days, but yesterday, she had a bowel movement and afterwards had syncopal episode where she woke up and found herself on the floor. She does not remember how the episode happened. Sometimes, she also feels pain to her left neck. She also has more burping, and thinks that it has been causing chest discomfort. Otherwise, she denies fever, chills, sob, palpitation, N/V, abdominal pain, diarrhea or dysuria. She continues to smoke few cig daily, and drinks alcohol intermittently. She also uses medical marijuana.      Significant labs: WBC 14.45k, Hb 13.9, Platelets 145k,  Troponin 17.6->17.1, BNP 84  D-dimer elevated at 453, but CTA chest negative for PE.    EKD: 75 bmp in SR with some ST depression in II, III, aVF, V5-6 leads, and this ST depression is new compared to her prior EKG.     mg and Ativan 1 mg IV given in ED.      # Chest pain and syncope with ST depression in inferolateral leads.  Troponin x2 negative. BNP 84  Admit to telemetry.  2D Echo and Carotid doppler in am.  SL nitro prn.  Continue her ASA, Plavix and Statin.  Consult cardiology in am.    # Elevated D-dimer.  CTA chest negative for PE or any infiltrates.    # Leukocytosis, likely reactive.  Afebrile.  Will repeat CBC in am.    # HTN, CAD, h/o cardiac stent, h/o right carotid stent and HPL.  Continue her ASA, Plavix, Toprol-XL, Zestril, Imdur and Crestor.    # Chronic thrombocytopenia.  Platelet count is 145k, at her baseline.  Continue to monitor.    # Tobacco, alcohol and medical marijuana abuse.  Smoking cessation counseling.  Supportive care.    # DVT prophylaxis: Lovenox sq daily.    Plan of care d/w the patient in detail at bedside, and she verbalized understanding.

## 2023-04-15 NOTE — CHART NOTE - NSCHARTNOTEFT_GEN_A_CORE
seen and examined  feels better, suspect vasovagal ep  cardio eval appreciated  pending TTE/ Carotid US      Vital Signs Last 24 Hrs  T(C): 36.7 (15 Apr 2023 09:04), Max: 36.9 (14 Apr 2023 20:54)  T(F): 98.1 (15 Apr 2023 09:04), Max: 98.4 (14 Apr 2023 20:54)  HR: 80 (15 Apr 2023 09:04) (56 - 80)  BP: 104/68 (15 Apr 2023 09:04) (102/60 - 148/75)  BP(mean): --  RR: 17 (15 Apr 2023 09:04) (12 - 18)  SpO2: 98% (15 Apr 2023 09:04) (97% - 99%)    Parameters below as of 15 Apr 2023 09:04  Patient On (Oxygen Delivery Method): room air                          13.6   9.20  )-----------( 119      ( 15 Apr 2023 08:35 )             42.0     04-14    137  |  107  |  25<H>  ----------------------------<  119<H>  3.9   |  23  |  1.15    Ca    9.1      14 Apr 2023 21:30    TPro  7.0  /  Alb  3.8  /  TBili  0.2  /  DBili  x   /  AST  18  /  ALT  21  /  AlkPhos  83  04-14

## 2023-04-15 NOTE — PATIENT PROFILE ADULT - FALL HARM RISK - HARM RISK INTERVENTIONS
Assistance OOB with selected safe patient handling equipment/Communicate Risk of Fall with Harm to all staff/Reinforce activity limits and safety measures with patient and family/Tailored Fall Risk Interventions/Use of alarms - bed, chair and/or voice tab/Visual Cue: Yellow wristband and red socks/Bed in lowest position, wheels locked, appropriate side rails in place/Call bell, personal items and telephone in reach/Instruct patient to call for assistance before getting out of bed or chair/Non-slip footwear when patient is out of bed/[place] to call system/Physically safe environment - no spills, clutter or unnecessary equipment/Purposeful Proactive Rounding/Room/bathroom lighting operational, light cord in reach

## 2023-04-15 NOTE — CONSULT NOTE ADULT - ASSESSMENT
62y Female with significant PMH of HTN, HLD, CAD s/p multiple stents, right carotid stenosis s/p R TCAR, PTSD, bipolar disorder, chronic thrombocytopenia, anxiety.  admitted s/p syncope (reflex syncope after micturition?) and c/o persistent left sided chest wall discomfort.    No significant ECG changes, trops negative.  Cath in 9/22 shows no significant obstructive coronary disease.  Smoker (still smoking and using nicotine), +ETOh, marijuana;  symptoms appear more GI.  Can increase antianginal coverage for possible small vessel disease, but may benefit from GI evaluation.    62y Female with significant PMH of HTN, HLD, CAD s/p multiple stents, right carotid stenosis s/p R TCAR, PTSD, bipolar disorder, chronic thrombocytopenia, anxiety.  admitted s/p syncope (reflex syncope after bowel movement?) and c/o persistent left sided chest wall discomfort.    No significant ECG changes, trops negative. echo unremarkable  Cath in 9/22 shows no significant obstructive coronary disease.  Smoker (still smoking and using nicotine), +ETOh, marijuana;  symptoms appear more GI.  Can increase antianginal coverage for possible small vessel disease, but may benefit from GI evaluation.   Ambulate and assess for symptoms and orthostasis.    Suggest f/u as o/p with Dr. Darlin Banks, may require further riak stratification, but currently no evidence of any acute cardiac condition.  Will follow only as needed.

## 2023-04-15 NOTE — H&P ADULT - NSHPLABSRESULTS_GEN_ALL_CORE
LABS:                        13.9   14.45 )-----------( 143      ( 14 Apr 2023 21:30 )             40.5     04-14    137  |  107  |  25<H>  ----------------------------<  119<H>  3.9   |  23  |  1.15    Ca    9.1      14 Apr 2023 21:30    TPro  7.0  /  Alb  3.8  /  TBili  0.2  /  DBili  x   /  AST  18  /  ALT  21  /  AlkPhos  83  04-14            < from: CT Angio Chest PE Protocol w/ IV Cont (04.15.23 @ 01:30) >      IMPRESSION:  No pulmonary embolism.    No focal pulmonary opacity or pleural effusion.    --- End of Report ---    < end of copied text >

## 2023-04-15 NOTE — CONSULT NOTE ADULT - SUBJECTIVE AND OBJECTIVE BOX
CARDIOLOGY CONSULTATION NOTE                                                                             62y Female with significant PMH of HTN, HLD, CAD s/p multiple stents, right carotid stenosis s/p R TCAR, PTSD, bipolar disorder, chronic thrombocytopenia, anxiety.  She presented to the ED s/p syncopal episode and chest discomfort x 1 week.  As per patient, yesterday she had a bowel movement and then got up and passed out, found herself on the floor. She also has more burping, and thinks that it has been causing chest discomfort. Otherwise, she denies fever, chills, sob, palpitation, N/V, abdominal pain, diarrhea or dysuria. She continues to smoke few cig daily, and drinks alcohol intermittently. She also uses medical marijuana.  Significant labs: WBC 14.45k, Hb 13.9, Platelets 145k,  Troponin 17.6->17.1, BNP 84 D-dimer elevated at 453, but CTA chest negative for PE.  mg and Ativan 1 mg IV given in ED.    REVIEW OF SYSTEMS: -----------------------------  CONSTITUTIONAL: No fever, weight loss, or fatigue EYES: No eye pain, visual disturbances, or discharge ENMT:  No difficulty hearing, tinnitus, vertigo; No sinus or throat pain NECK: No pain or stiffness BREASTS: No pain, masses, or nipple discharge RESPIRATORY: No cough, wheezing, chills or hemoptysis; No shortness of breath CARDIOVASCULAR: See HPI GASTROINTESTINAL: No abdominal or epigastric pain. No nausea, vomiting, or hematemesis; No diarrhea or constipation. No melena or hematochezia. GENITOURINARY: No dysuria, frequency, hematuria, or incontinence NEUROLOGICAL: No headaches, memory loss, loss of strength, numbness, or tremors SKIN: No itching, burning, rashes, or lesions  LYMPH NODES: No enlarged glands ENDOCRINE: No heat or cold intolerance; No hair loss MUSCULOSKELETAL: No joint pain or swelling; No muscle, back, or extremity pain PSYCHIATRIC: No depression, anxiety, mood swings, or difficulty sleeping HEME/LYMPH: No easy bruising, or bleeding gums ALLERGY AND IMMUNOLOGIC: No hives or eczema  Home Medications: acetaminophen 325 mg oral tablet: 2 tab(s) orally every 6 hours, As Needed - 3) (2023 07:40) Claritin 10 mg oral tablet: 1 tab(s) orally once a day am (2023 06:23) Crestor 20 mg oral tablet: 1 tab(s) orally once a day am (:23) Imdur 30 mg oral tablet, extended release: 1 tab(s) orally once a day (in the morning) am (2023 06:23) nicotine 21 mg/24 hr transdermal film, extended release: 1 patch transdermal once a day (:23) Plavix 75 mg oral tablet: 1 tab(s) orally once a day am (2023 06:23) Protonix 20 mg oral delayed release tablet: 1 tab(s) orally once a day am (:23) Zestril 30 mg oral tablet: 1 tab(s) orally once a day am (2023 06:23)   MEDICATIONS  (STANDING): aspirin enteric coated 81 milliGRAM(s) Oral daily atorvastatin 80 milliGRAM(s) Oral at bedtime clopidogrel Tablet 75 milliGRAM(s) Oral daily enoxaparin Injectable 40 milliGRAM(s) SubCutaneous every 24 hours isosorbide   mononitrate ER Tablet (IMDUR) 30 milliGRAM(s) Oral daily lisinopril 20 milliGRAM(s) Oral daily metoprolol succinate ER 25 milliGRAM(s) Oral daily pantoprazole    Tablet 40 milliGRAM(s) Oral before breakfast   ALLERGIES: Brilinta (Short breath) Gluten (Vomiting) morphine (Nausea) morphine (Vomiting) Nuts (Unknown) peanuts (Hives; Anaphylaxis) peanuts (Rash)   FAMILY HISTORY: FHx: diabetes mellitus sister  Family history of breast cancer in mother Mother  at age 55 of breast cancer  FH: lung cancer Father  of Lung Cancer @ age 55; Brother currently has lung cancer  FH: heart attack (Grandparent) materanal grandma with MI 39yo,  60    PHYSICAL EXAMINATION: ----------------------------- T(C): 36.7 (04-15-23 @ 09:04), Max: 36.9 (23 @ 20:54) HR: 80 (04-15-23 @ 09:04) (56 - 80) BP: 104/68 (04-15-23 @ 09:04) (102/60 - 148/75) RR: 17 (04-15-23 @ 09:04) (12 - 18) SpO2: 98% (04-15-23 @ 09:04) (97% - 99%) Wt(kg): --  Height (cm): 162.6 ( @ 20:54) Weight (kg): 64.4 ( @ 20:54) BMI (kg/m2): 24.4 ( @ 20:54) BSA (m2): 1.69 ( @ 20:54)  Constitutional: well developed, normal appearance, well groomed, well nourished, no deformities and no acute distress.  Eyes: the conjunctiva exhibited no abnormalities and the eyelids demonstrated no xanthelasmas.  HEENT: normal oral mucosa, no oral pallor and no oral cyanosis.  Neck: normal jugular venous A waves present, normal jugular venous V waves present and no jugular venous roberto A waves.  Pulmonary: no respiratory distress, normal respiratory rhythm and effort, no accessory muscle use and lungs were clear to auscultation bilaterally.  Cardiovascular: heart rate and rhythm were normal, normal S1 and S2 and no murmur, gallop, rub, heave or thrill are present.  Abdomen: soft, non-tender, no hepato-splenomegaly and no abdominal mass palpated.  Musculoskeletal: the gait could not be assessed..  Extremities: no clubbing of the fingernails, no localized cyanosis, no petechial hemorrhages and no ischemic changes.  Skin: normal skin color and pigmentation, no rash, no venous stasis, no skin lesions, no skin ulcer and no xanthoma was observed.  Psychiatric: oriented to person, place, and time, the affect was normal, the mood was normal and not feeling anxious.   ECG: -------   < from: 12 Lead ECG (23 @ 22:00) >  Ventricular Rate 75 BPM  Atrial Rate 75 BPM  P-R Interval 138 ms  QRS Duration 88 ms  Q-T Interval 444 ms  QTC Calculation(Bazett) 495 ms  P Axis 75 degrees  R Axis 67 degrees  T Axis 76 degrees  Diagnosis Line Normal sinus rhythm   Confirmed by Maurice Callahan MD ([id number]) on 4/15/2023 9:26:37 AM  < end of copied text >  LABS:  --------   137  |  107  |  25<H> ----------------------------<  119<H> 3.9   |  23  |  1.15  Ca    9.1      2023 21:30  TPro  7.0  /  Alb  3.8  /  TBili  0.2  /  DBili  x   /  AST  18  /  ALT  21  /  AlkPhos  83                         13.6  9.20  )-----------( 119      ( 15 Apr 2023 08:35 )            42.0          RADIOLOGY REPORTS: ----------------------------- < from: CT Angio Chest PE Protocol w/ IV Cont (04.15.23 @ 01:30) >  ACC: [id number] EXAM:  CT ANGIO CHEST PULM ART [facility]   ORDERED BY: LUIS BERKOWITZ   PROCEDURE DATE:  04/15/2023      INTERPRETATION:  CLINICAL INFORMATION: Chest pain, syncope, evaluate for  pulmonary embolism  COMPARISON: None.  CONTRAST/COMPLICATIONS: IV Contrast: Omnipaque 350  70 cc administered   30 cc discarded Oral Contrast: NONE Complications: None reported at time of study completion  PROCEDURE: CT Angiography of the Chest. Sagittal and coronal reformats were performed as well as 3D (MIP)  reconstructions.  FINDINGS:  LUNGS AND AIRWAYS: Patent central airways.  Dependent atelectatic changes  at the lung bases. PLEURA: No pleural effusion. MEDIASTINUM AND BRANDON: No lymphadenopathy. VESSELS: Pulmonary arterial opacification is optimal. No pulmonary  embolism. Atherosclerotic calcification of the thoracic aorta and  arteries. HEART: Heart size is normal. No pericardial effusion. CHEST WALL AND LOWER NECK: Within normal limits. VISUALIZED UPPER ABDOMEN: Within normal limits. BONES: Within normal limits.  IMPRESSION: No pulmonary embolism.  No focal pulmonary opacity or pleural effusion.  --- End of Report ---      DARIAN THOMAS MD; Attending Radiologist This document has been electronically signed. Apr 15 2023  2:04AM  < end of copied text >    ECHOCARDIOGRAM: --------------------------- pending   CARDIAC CATHETERIZATION: ---------------------------------   < from: Cardiac Catheterization (22 @ 15:15) >   Study Date:     2022  Name:           ARABELLA BOWDEN  :            1960   (61 years)  Gender:         female  MR#:            [id number]  MPI#:           [id number]  Patient Class:  Inpatient   Cath Lab Report   Diagnostic Cardiologist:       Brady Stover MD  Fellow:                        Maryana Brantley  Fellow:                        Corinne Silvestre MD  Referring Physician:           Sushila Okeefe MD   Diagnostic Findings:   Coronary Angiography  The coronary circulation is right dominant. Cardiac catheterization was performed electively.  LM  Left main artery: Normal.    LAD  Proximal left anterior descending: There is a 40 % stenosis in the distal third portion of the segment at branch to D1. Mid left anterior descending: There is a 30 % stenosis in the distal third portion of the segment. Distal left anterior descending: There is a 20 % stenosis in the proximal third portion of the segment within the stented segment.    CX  Circumflex: Angiography shows minor irregularities.   Proximal circumflex: There is a 30 % stenosis in the distal third portion of the segment.  RCA  Proximal right coronary artery: There is a 20 % stenosis in the distal third portion of the segment. Mid right coronary artery: There is a 50 % stenosis in the middle third portion of the segment at the distal margin of the stented segment. MAGGIE Flow 3.   Distal right coronary artery: There is a 20 % stenosis in the middle third portion of the segment within the stented segment. Right posterior descending artery: There is a 50 % stenosis in the ostium portion of the segment. MAGGIE Flow 3.   Diagnostic Conclusions:  Moderate in-stent restenosis in mid RCA and distal LAD (small caliber). Patent distal RCA/RPLS stent.  Recommendations:  Aggressive risk factor modification. Optimize medical therapy for stable ischemic heart disease. Smoking cessation.   CARDIOLOGY CONSULTATION NOTE                                                                             62y Female with significant PMH of HTN, HLD, CAD s/p multiple stents, right carotid stenosis s/p R TCAR, PTSD, bipolar disorder, chronic thrombocytopenia, anxiety.  She presented to the ED s/p syncopal episode and chest discomfort x 1 week. Increased cough, pain is pleuritic and excerbated by position. As per patient, yesterday she had a bowel movement and then got up and passed out, found herself on the floor. She also has more burping, and thinks that it has been causing chest discomfort. Otherwise, she denies fever, chills, sob, palpitation, N/V, abdominal pain, diarrhea or dysuria. She continues to smoke few cigs daily, and drinks alcohol intermittently. She also uses medical marijuana.  Significant labs: WBC 14.45k, Hb 13.9, Platelets 145k,  Troponin 17.6->17.1, BNP 84 D-dimer elevated at 453, but CTA chest negative for PE.  mg and Ativan 1 mg IV given in ED.    REVIEW OF SYSTEMS: -----------------------------  CONSTITUTIONAL: No fever, weight loss, or fatigue EYES: No eye pain, visual disturbances, or discharge ENMT:  No difficulty hearing, tinnitus, vertigo; No sinus or throat pain NECK: No pain or stiffness BREASTS: No pain, masses, or nipple discharge RESPIRATORY: No cough, wheezing, chills or hemoptysis; No shortness of breath CARDIOVASCULAR: See HPI GASTROINTESTINAL: No abdominal or epigastric pain. No nausea, vomiting, or hematemesis; No diarrhea or constipation. No melena or hematochezia. GENITOURINARY: No dysuria, frequency, hematuria, or incontinence NEUROLOGICAL: No headaches, memory loss, loss of strength, numbness, or tremors SKIN: No itching, burning, rashes, or lesions  LYMPH NODES: No enlarged glands ENDOCRINE: No heat or cold intolerance; No hair loss MUSCULOSKELETAL: No joint pain or swelling; No muscle, back, or extremity pain PSYCHIATRIC: No depression, anxiety, mood swings, or difficulty sleeping HEME/LYMPH: No easy bruising, or bleeding gums ALLERGY AND IMMUNOLOGIC: No hives or eczema  Home Medications: acetaminophen 325 mg oral tablet: 2 tab(s) orally every 6 hours, As Needed - 3) (2023 07:40) Claritin 10 mg oral tablet: 1 tab(s) orally once a day am (2023 06:23) Crestor 20 mg oral tablet: 1 tab(s) orally once a day am (2023 06:23) Imdur 30 mg oral tablet, extended release: 1 tab(s) orally once a day (in the morning) am (2023 06:23) nicotine 21 mg/24 hr transdermal film, extended release: 1 patch transdermal once a day (:23) Plavix 75 mg oral tablet: 1 tab(s) orally once a day am (2023 06:23) Protonix 20 mg oral delayed release tablet: 1 tab(s) orally once a day am (2023 06:23) Zestril 30 mg oral tablet: 1 tab(s) orally once a day am (2023 06:23)   MEDICATIONS  (STANDING): aspirin enteric coated 81 milliGRAM(s) Oral daily atorvastatin 80 milliGRAM(s) Oral at bedtime clopidogrel Tablet 75 milliGRAM(s) Oral daily enoxaparin Injectable 40 milliGRAM(s) SubCutaneous every 24 hours isosorbide   mononitrate ER Tablet (IMDUR) 30 milliGRAM(s) Oral daily lisinopril 20 milliGRAM(s) Oral daily metoprolol succinate ER 25 milliGRAM(s) Oral daily pantoprazole    Tablet 40 milliGRAM(s) Oral before breakfast   ALLERGIES: Brilinta (Short breath) Gluten (Vomiting) morphine (Nausea) morphine (Vomiting) Nuts (Unknown) peanuts (Hives; Anaphylaxis) peanuts (Rash)   FAMILY HISTORY: FHx: diabetes mellitus sister  Family history of breast cancer in mother Mother  at age 55 of breast cancer  FH: lung cancer Father  of Lung Cancer @ age 55; Brother currently has lung cancer  FH: heart attack (Grandparent) materanal grandma with MI 39yo,  60    PHYSICAL EXAMINATION: ----------------------------- T(C): 36.7 (04-15-23 @ 09:04), Max: 36.9 (23 @ 20:54) HR: 80 (04-15-23 @ 09:04) (56 - 80) BP: 104/68 (04-15-23 @ 09:04) (102/60 - 148/75) RR: 17 (04-15-23 @ 09:04) (12 - 18) SpO2: 98% (04-15-23 @ 09:04) (97% - 99%) Wt(kg): --  Height (cm): 162.6 (:54) Weight (kg): 64.4 (:54) BMI (kg/m2): 24.4 (:54) BSA (m2): 1.69 (:54)  Constitutional: well developed, normal appearance, well groomed, well nourished, no deformities and no acute distress.  Eyes: the conjunctiva exhibited no abnormalities and the eyelids demonstrated no xanthelasmas.  HEENT: normal oral mucosa, no oral pallor and no oral cyanosis.  Neck: normal jugular venous A waves present, normal jugular venous V waves present and no jugular venous roberto A waves.  Pulmonary: no respiratory distress, normal respiratory rhythm and effort, no accessory muscle use , scattered rhonchi appreciated. Cardiovascular: heart rate and rhythm were normal, normal S1 and S2 and no murmur, gallop, rub, heave or thrill are present.  Abdomen: soft, non-tender, no hepato-splenomegaly and no abdominal mass palpated.  Musculoskeletal: the gait could not be assessed..  Extremities: no clubbing of the fingernails, no localized cyanosis, no petechial hemorrhages and no ischemic changes.  Skin: normal skin color and pigmentation, no rash, no venous stasis, no skin lesions, no skin ulcer and no xanthoma was observed.  Psychiatric: oriented to person, place, and time, the affect was normal, the mood was normal and not feeling anxious.   ECG: -------   < from: 12 Lead ECG (23 @ 22:00) >  Ventricular Rate 75 BPM  Atrial Rate 75 BPM  P-R Interval 138 ms  QRS Duration 88 ms  Q-T Interval 444 ms  QTC Calculation(Bazett) 495 ms  P Axis 75 degrees  R Axis 67 degrees  T Axis 76 degrees  Diagnosis Line Normal sinus rhythm   Confirmed by Maurice Callahan MD ([id number]) on 4/15/2023 9:26:37 AM  < end of copied text >  LABS:  --------   137  |  107  |  25<H> ----------------------------<  119<H> 3.9   |  23  |  1.15  Ca    9.1      2023 21:30  TPro  7.0  /  Alb  3.8  /  TBili  0.2  /  DBili  x   /  AST  18  /  ALT  21  /  AlkPhos  83                         13.6  9.20  )-----------( 119      ( 15 Apr 2023 08:35 )            42.0          RADIOLOGY REPORTS: ----------------------------- < from: CT Angio Chest PE Protocol w/ IV Cont (04.15.23 @ 01:30) >  ACC: [id number] EXAM:  CT ANGIO CHEST PULM ART [facility]   ORDERED BY: LUIS BERKOWITZ   PROCEDURE DATE:  04/15/2023      INTERPRETATION:  CLINICAL INFORMATION: Chest pain, syncope, evaluate for  pulmonary embolism  COMPARISON: None.  CONTRAST/COMPLICATIONS: IV Contrast: Omnipaque 350  70 cc administered   30 cc discarded Oral Contrast: NONE Complications: None reported at time of study completion  PROCEDURE: CT Angiography of the Chest. Sagittal and coronal reformats were performed as well as 3D (MIP)  reconstructions.  FINDINGS:  LUNGS AND AIRWAYS: Patent central airways.  Dependent atelectatic changes  at the lung bases. PLEURA: No pleural effusion. MEDIASTINUM AND BRANDON: No lymphadenopathy. VESSELS: Pulmonary arterial opacification is optimal. No pulmonary  embolism. Atherosclerotic calcification of the thoracic aorta and  arteries. HEART: Heart size is normal. No pericardial effusion. CHEST WALL AND LOWER NECK: Within normal limits. VISUALIZED UPPER ABDOMEN: Within normal limits. BONES: Within normal limits.  IMPRESSION: No pulmonary embolism.  No focal pulmonary opacity or pleural effusion.  --- End of Report ---      DARIAN THOMAS MD; Attending Radiologist This document has been electronically signed. Apr 15 2023  2:04AM  < end of copied text >    ECHOCARDIOGRAM: --------------------------- < from: TTE Echo Complete w/o Contrast w/ Doppler (04.15.23 @ 11:04) >  ACC: [id number] EXAM:  ECHO TTE WO CON COMP W DOPP                        PROCEDURE DATE:  04/15/2023      INTERPRETATION:  TRANSTHORACIC ECHOCARDIOGRAM REPORT    Patient Name:   ARABELLA BOWDEN Patient Location: [address] [place] [address] Rec #:  [id number]       Accession #:      [id number] Account #:      [id number]       Height:           63.8 in 162.0 cm YOB: 1960       Weight:           141.1 lb 64.00 kg Patient Age:    62 years         BSA:              1.68 m² Patient Gender: F                BP:               121/65 mmHg   Date of Exam:        4/15/2023 11:04:02 AM Sonographer:         LANI Referring Physician: VELASQUEZ METZGER  Procedure:   2D Echo/Doppler/Color Doppler Complete. Indications: R07.9 - Chest pain, unspecified Diagnosis:   R07.9 - Chest pain, unspecified    2D AND M-MODE MEASUREMENTS (normal ranges within parentheses): Left                 Normal   Aorta/Left            Normal Ventricle:                    Atrium: IVSd (2D):    1.02  (0.7-1.1) Aortic Root    3.60  (2.4-3.7)                cm             (2D):           cm LVPWd (2D):   1.02  (0.7-1.1) LA Vol Index   24.1                cm             (A4C):        ml/m² LVIDd (2D):   4.30  (3.4-5.7) LA Vol Index   36.0 cm             (A2C):        ml/m² LVIDs (2D):   2.46            LA Vol Index   30.6                cm             (BP):         ml/m² LV FS (2D):   42.7   (>25%)   Right                 %             Ventricle: LV EF (2D):   74 %   (>55%)   TAPSE:          1.70 cm Relative Wall 0.47   (<0.42) Thickness  LV DIASTOLIC FUNCTION: MV Peak E: 0.66 m/s Decel Time:  279 msec MV Peak A: 0.73 m/s Septal E/e'  7.4 E/A Ratio: 0.90     Lateral E/e' 5.8 Septal e'  0.1 m/s Lateral e' 0.1 m/s  SPECTRAL DOPPLER ANALYSIS (where applicable): Mitral Valve: MV P1/2 Time: 81.05 msec MV Area, PHT: 2.71 cm²  Aortic Valve: AoV Max Rob: 1.59 m/s AoV Peak PG: 10.1 mmHg AoV Mean P.0 mmHg  LVOT Vmax: 1.21 m/s LVOT VTI: 0.285 m LVOT Diameter:2.18 cm  AoV Area, Vmax: 2.84 cm² AoV Area, VTI: 2.72 cm² AoV Area, Vmn:  Tricuspid Valve and PA/RV Systolic Pressure: TR Max Velocity: 2.53 m/s RA  Pressure: 5 mmHg RVSP/PASP: 30.5 mmHg   PHYSICIAN INTERPRETATION: Left Ventricle: Normal left ventricular size and wall thicknesses, with  normal systolic and diastolic function. Global LV systolic function was normal. Normal segmental left ventricular  systolic function. Right Ventricle: Normal right ventricular size and function. LeftAtrium: The left atrium is normal in size. Right Atrium: The right atrium is normal in size. Pericardium: There is no evidence of pericardial effusion. Mitral Valve: Structurally normal mitral valve, with normal leaflet  excursion. Mild mitral valve regurgitation is seen. Tricuspid Valve: Structurally normal tricuspid valve, with normal leaflet  excursion. Mild tricuspid regurgitation is visualized. Aortic Valve: Normal trileaflet aortic valve with normal opening. No  evidence of aortic valve regurgitation is seen. Pulmonic Valve: Structurally normal pulmonic valve, with normal leaflet  excursion. Trace pulmonic valve regurgitation. Aorta: The aortic root and ascending aorta are structurally normal, with  no evidence of dilitation. Pulmonary Artery: The main pulmonary artery is normal in size.   Summary:  1. Normal global left ventricular systolic function.  2. Mild mitral valve regurgitation.  3. Mild tricuspid regurgitation.   [id number] Maurice Callahan MD, [facility] Electronically signed on 4/15/2023 at 12:38:19 PM      *** Final ***  < end of copied text >    CARDIAC CATHETERIZATION: ---------------------------------   < from: Cardiac Catheterization (22 @ 15:15) >   Study Date:     2022  Name:           ARABELLA BOWDEN  :            1960   (61 years)  Gender:         female  MR#:            [id number]  [facility]#:           [id number]  Patient Class:  Inpatient   Cath Lab Report   Diagnostic Cardiologist:       Brady Stover MD  Fellow:                        Maryana Brantley  Fellow:                        Corinne Silvestre MD  Referring Physician:           Sushila Metzger MD   Diagnostic Findings:   Coronary Angiography  The coronary circulation is right dominant. Cardiac catheterization was performed electively.  LM  Left main artery: Normal.    LAD  Proximal left anterior descending: There is a 40 % stenosis in the distal third portion of the segment at branch to D1. Mid left anterior descending: There is a 30 % stenosis in the distal third portion of the segment. Distal left anterior descending: There is a 20 % stenosis in the proximal third portion of the segment within the stented segment.    CX  Circumflex: Angiography shows minor irregularities.   Proximal circumflex: There is a 30 % stenosis in the distal third portion of the segment.  RCA  Proximal right coronary artery: There is a 20 % stenosis in the distal third portion of the segment. Mid right coronary artery: There is a 50 % stenosis in the middle third portion of the segment at the distal margin of the stented segment. MAGGIE Flow 3.   Distal right coronary artery: There is a 20 % stenosis in the middle third portion of the segment within the stented segment. Right posterior descending artery: There is a 50 % stenosis in the ostium portion of the segment. MAGGIE Flow 3.   Diagnostic Conclusions:  Moderate in-stent restenosis in mid RCA and distal LAD (small caliber). Patent distal RCA/RPLS stent.  Recommendations:  Aggressive risk factor modification. Optimize medical therapy for stable ischemic heart disease. Smoking cessation.

## 2023-04-16 PROCEDURE — 99223 1ST HOSP IP/OBS HIGH 75: CPT

## 2023-04-16 PROCEDURE — 99233 SBSQ HOSP IP/OBS HIGH 50: CPT

## 2023-04-16 RX ORDER — IBUPROFEN 200 MG
400 TABLET ORAL EVERY 8 HOURS
Refills: 0 | Status: DISCONTINUED | OUTPATIENT
Start: 2023-04-16 | End: 2023-04-18

## 2023-04-16 RX ORDER — SENNA PLUS 8.6 MG/1
2 TABLET ORAL AT BEDTIME
Refills: 0 | Status: DISCONTINUED | OUTPATIENT
Start: 2023-04-16 | End: 2023-04-18

## 2023-04-16 RX ORDER — IBUPROFEN 200 MG
400 TABLET ORAL ONCE
Refills: 0 | Status: COMPLETED | OUTPATIENT
Start: 2023-04-16 | End: 2023-04-16

## 2023-04-16 RX ORDER — POLYETHYLENE GLYCOL 3350 17 G/17G
17 POWDER, FOR SOLUTION ORAL EVERY 24 HOURS
Refills: 0 | Status: ACTIVE | OUTPATIENT
Start: 2023-04-16 | End: 2024-03-14

## 2023-04-16 RX ADMIN — Medication 1 PATCH: at 19:30

## 2023-04-16 RX ADMIN — Medication 650 MILLIGRAM(S): at 11:31

## 2023-04-16 RX ADMIN — PANTOPRAZOLE SODIUM 40 MILLIGRAM(S): 20 TABLET, DELAYED RELEASE ORAL at 06:16

## 2023-04-16 RX ADMIN — Medication 400 MILLIGRAM(S): at 16:02

## 2023-04-16 RX ADMIN — Medication 81 MILLIGRAM(S): at 11:27

## 2023-04-16 RX ADMIN — Medication 1 PATCH: at 11:28

## 2023-04-16 RX ADMIN — Medication 3 MILLIGRAM(S): at 21:09

## 2023-04-16 RX ADMIN — Medication 1 PATCH: at 11:48

## 2023-04-16 RX ADMIN — Medication 400 MILLIGRAM(S): at 14:28

## 2023-04-16 RX ADMIN — Medication 1 PATCH: at 08:00

## 2023-04-16 RX ADMIN — LISINOPRIL 20 MILLIGRAM(S): 2.5 TABLET ORAL at 06:16

## 2023-04-16 RX ADMIN — Medication 650 MILLIGRAM(S): at 10:25

## 2023-04-16 RX ADMIN — CLOPIDOGREL BISULFATE 75 MILLIGRAM(S): 75 TABLET, FILM COATED ORAL at 11:27

## 2023-04-16 RX ADMIN — Medication 650 MILLIGRAM(S): at 20:13

## 2023-04-16 RX ADMIN — ENOXAPARIN SODIUM 40 MILLIGRAM(S): 100 INJECTION SUBCUTANEOUS at 06:16

## 2023-04-16 RX ADMIN — ISOSORBIDE MONONITRATE 30 MILLIGRAM(S): 60 TABLET, EXTENDED RELEASE ORAL at 11:28

## 2023-04-16 RX ADMIN — ATORVASTATIN CALCIUM 80 MILLIGRAM(S): 80 TABLET, FILM COATED ORAL at 21:09

## 2023-04-16 RX ADMIN — Medication 650 MILLIGRAM(S): at 21:15

## 2023-04-16 RX ADMIN — ONDANSETRON 4 MILLIGRAM(S): 8 TABLET, FILM COATED ORAL at 18:07

## 2023-04-16 NOTE — PROGRESS NOTE ADULT - SUBJECTIVE AND OBJECTIVE BOX
Patient is a 62y old  Female who presents with a chief complaint of Chest pain and abnormal EKG. (15 Apr 2023 09:22)    INTERVAL HPI/OVERNIGHT EVENTS:    MEDICATIONS  (STANDING):  aspirin enteric coated 81 milliGRAM(s) Oral daily  atorvastatin 80 milliGRAM(s) Oral at bedtime  clopidogrel Tablet 75 milliGRAM(s) Oral daily  enoxaparin Injectable 40 milliGRAM(s) SubCutaneous every 24 hours  isosorbide   mononitrate ER Tablet (IMDUR) 30 milliGRAM(s) Oral daily  lisinopril 20 milliGRAM(s) Oral daily  metoprolol succinate ER 25 milliGRAM(s) Oral daily  nicotine - 21 mG/24Hr(s) Patch 1 Patch Transdermal daily  pantoprazole    Tablet 40 milliGRAM(s) Oral before breakfast    MEDICATIONS  (PRN):  acetaminophen     Tablet .. 650 milliGRAM(s) Oral every 6 hours PRN Temp greater or equal to 38C (100.4F), Mild Pain (1 - 3)  aluminum hydroxide/magnesium hydroxide/simethicone Suspension 30 milliLiter(s) Oral every 4 hours PRN Dyspepsia  melatonin 3 milliGRAM(s) Oral at bedtime PRN Insomnia  nitroglycerin     SubLingual 0.4 milliGRAM(s) SubLingual every 5 minutes PRN Chest Pain  ondansetron Injectable 4 milliGRAM(s) IV Push every 8 hours PRN Nausea and/or Vomiting    Allergies    morphine (Nausea)  peanuts (Rash)  Nuts (Unknown)  peanuts (Hives; Anaphylaxis)  Brilinta (Short breath)  Gluten (Vomiting)  morphine (Vomiting)    Intolerances      REVIEW OF SYSTEMS:  All other systems reviewed and are negative    Vital Signs Last 24 Hrs  T(C): 36.6 (16 Apr 2023 05:08), Max: 36.8 (15 Apr 2023 16:15)  T(F): 97.8 (16 Apr 2023 05:08), Max: 98.2 (15 Apr 2023 16:15)  HR: 67 (16 Apr 2023 05:09) (49 - 76)  BP: 116/77 (16 Apr 2023 05:09) (106/71 - 149/75)  BP(mean): --  RR: 18 (16 Apr 2023 05:09) (18 - 18)  SpO2: 99% (16 Apr 2023 05:09) (97% - 99%)    Parameters below as of 16 Apr 2023 05:09  Patient On (Oxygen Delivery Method): room air      Daily     Daily   I&O's Summary    15 Apr 2023 07:01  -  16 Apr 2023 07:00  --------------------------------------------------------  IN: 1096 mL / OUT: 0 mL / NET: 1096 mL      CAPILLARY BLOOD GLUCOSE        PHYSICAL EXAM:  GENERAL: NAD,    HEAD:  Atraumatic, Normocephalic  EYES: EOMI, PERRLA, conjunctiva and sclera clear  ENMT: No tonsillar erythema, exudates, or enlargement; Moist mucous membranes, Good dentition, No lesions  NECK: Supple, No JVD, Normal thyroid  NERVOUS SYSTEM:  Alert & Oriented X3, Good concentration; Motor Strength 5/5 B/L upper and lower extremities; DTRs 2+ intact and symmetric  CHEST/LUNG: Clear to percussion bilaterally; No rales, rhonchi, wheezing, or rubs  HEART: Regular rate and rhythm; No murmurs, rubs, or gallops  ABDOMEN: Soft, Nontender, Nondistended; Bowel sounds present  EXTREMITIES:  2+ Peripheral Pulses, No clubbing, cyanosis, or edema  LYMPH: No lymphadenopathy noted  SKIN: No rashes or lesions    Labs                          13.6   9.20  )-----------( 119      ( 15 Apr 2023 08:35 )             42.0     04-14    137  |  107  |  25<H>  ----------------------------<  119<H>  3.9   |  23  |  1.15    Ca    9.1      14 Apr 2023 21:30    TPro  7.0  /  Alb  3.8  /  TBili  0.2  /  DBili  x   /  AST  18  /  ALT  21  /  AlkPhos  83  04-14                        DVT prophylaxis: > Lovenox 40mg SQ daily  > Heparin   > SCD's

## 2023-04-16 NOTE — CONSULT NOTE ADULT - ASSESSMENT
62y Female  s/p syncopal episode and chest pain, Vascular team consulted to r/o Carotid stenosis  PMH of HTN, HLD, CAD s/p multiple stents, right carotid stenosis s/p R TCAR, PTSD, bipolar disorder, chronic thrombocytopenia    Had Carotid stent insertion with Dr Stanislaw Alarcon in Jan of this year, should f/u with him as outpatient  Carotid doppler shows narrowing at ICA, However no elevated velocities seen  No Vascular intervention warranted CTA of head and neck cancelled  Discussed with Dr Dee

## 2023-04-16 NOTE — PROGRESS NOTE ADULT - ASSESSMENT
The patient is a 62y Female with significant PMH of HTN, HLD, CAD s/p multiple stents, right carotid stenosis s/p R TCAR, PTSD, bipolar disorder, chronic thrombocytopenia, anxiety and others presented to the ED s/p syncopal episode and chest pain for further evaluation and management.  She has some chest pain with discomfort for last few days, but yesterday, she had a bowel movement and afterwards had syncopal episode where she woke up and found herself on the floor. She does not remember how the episode happened. Sometimes, she also feels pain to her left neck. She also has more burping, and thinks that it has been causing chest discomfort. Otherwise, she denies fever, chills, sob, palpitation, N/V, abdominal pain, diarrhea or dysuria. She continues to smoke few cig daily, and drinks alcohol intermittently. She also uses medical marijuana.      Significant labs: WBC 14.45k, Hb 13.9, Platelets 145k,  Troponin 17.6->17.1, BNP 84  D-dimer elevated at 453, but CTA chest negative for PE.    EKD: 75 bmp in SR with some ST depression in II, III, aVF, V5-6 leads, and this ST depression is new compared to her prior EKG.     mg and Ativan 1 mg IV given in ED.      # Chest pain and syncope with ST depression in inferolateral leads.  Troponin x2 negative. BNP 84   to telemetry.  2D Echo and Carotid doppler done  Carotid d/w Vascular, rec cta/head/neck, will see patient   SL nitro prn.  Continue her ASA, Plavix and Statin.  cardio c/s appreciated     #Bradycardia overnight  no symptoms, continue to monitor on tele    # Elevated D-dimer.  CTA chest negative for PE or any infiltrates.    # Leukocytosis, likely reactive.  Afebrile.       # HTN, CAD, h/o cardiac stent, h/o right carotid stent and HPL.  Continue her ASA, Plavix, Toprol-XL, Zestril, Imdur and Crestor.    # Chronic thrombocytopenia.  Platelet count is 145k, at her baseline.  Continue to monitor.    # Tobacco, alcohol and medical marijuana abuse.  Smoking cessation counseling.  Supportive care.    # DVT prophylaxis: Lovenox sq daily.    Plan of care d/w the patient in detail at bedside, and she verbalized understanding.

## 2023-04-16 NOTE — PROVIDER CONTACT NOTE (OTHER) - ASSESSMENT
Easily arousable, VSS, denied chest pain or dizziness, stated she used to be a  when she was younger

## 2023-04-16 NOTE — CONSULT NOTE ADULT - NS ATTEND AMEND GEN_ALL_CORE FT
I have seen and examiend the patient and agree with the above and the carotid duplex is reviewed . There is patent right ICA stent, good result of TCAR, NO elevated flow velocities.  plan-- no need for further w/u, patient is asymptomatic, and can follow up with Dr Alarcon upon DC. No need for surgical intervention at this time.

## 2023-04-16 NOTE — CONSULT NOTE ADULT - SUBJECTIVE AND OBJECTIVE BOX
Chief Complaint:  Patient is a 62y old  Female who presents with a chief complaint of Chest pain and abnormal EKG. (2023 09:39)      HPI:  Chief Complaint: chest pain.    The patient is a 62y Female with significant PMH of HTN, HLD, CAD s/p multiple stents, right carotid stenosis s/p R TCAR, PTSD, bipolar disorder, chronic thrombocytopenia, anxiety and others presented to the ED s/p syncopal episode and chest pain for further evaluation and management.  She has some chest pain with discomfort for last few days, but yesterday, she had a bowel movement and afterwards had syncopal episode where she woke up and found herself on the floor. She does not remember how the episode happened. Sometimes, she also feels pain to her left neck. She also has more burping, and thinks that it has been causing chest discomfort. Otherwise, she denies fever, chills, sob, palpitation, N/V, abdominal pain, diarrhea or dysuria. She continues to smoke few cig daily, and drinks alcohol intermittently. She also uses medical marijuana.    Significant labs: WBC 14.45k, Hb 13.9, Platelets 145k,  Troponin 17.6->17.1, BNP 84  D-dimer elevated at 453, but CTA chest negative for PE.    EKD: 75 bmp in SR with some ST depression in II, III, aVF, V5-6 leads, and this ST depression is new compared to her prior EKG.     mg and Ativan 1 mg IV given in ED.   (15 Apr 2023 04:23)      PMH/PSH:PAST MEDICAL & SURGICAL HISTORY:  Bipolar 1 disorder  resolved per patient      Heart disease  Secondary hypertension  PTSD (post-traumatic stress disorder)  Anxiety  CAD (coronary artery disease)  Hyperlipidemia  History of herniated intervertebral disc  History of breast cancer  Right carotid artery occlusion  Psoriasis  H/O thrombocytopenia  History of appendectomy  History of lumpectomy  Perforated ulcer  treated with emergent surgery  History of ovarian cyst  benign  History of bilateral breast redction surgery  H/O angioplasty  stents placed in  & 2019  Fibroid  laproscopic removal  Stented coronary artery      Allergies:  morphine (Nausea)  peanuts (Rash)  Nuts (Unknown)  peanuts (Hives; Anaphylaxis)  Brilinta (Short breath)  Gluten (Vomiting)  morphine (Vomiting)      Medications:  acetaminophen     Tablet .. 650 milliGRAM(s) Oral every 6 hours PRN  aluminum hydroxide/magnesium hydroxide/simethicone Suspension 30 milliLiter(s) Oral every 4 hours PRN  aspirin enteric coated 81 milliGRAM(s) Oral daily  atorvastatin 80 milliGRAM(s) Oral at bedtime  clopidogrel Tablet 75 milliGRAM(s) Oral daily  enoxaparin Injectable 40 milliGRAM(s) SubCutaneous every 24 hours  isosorbide   mononitrate ER Tablet (IMDUR) 30 milliGRAM(s) Oral daily  lisinopril 20 milliGRAM(s) Oral daily  melatonin 3 milliGRAM(s) Oral at bedtime PRN  metoprolol succinate ER 25 milliGRAM(s) Oral daily  nicotine - 21 mG/24Hr(s) Patch 1 Patch Transdermal daily  nitroglycerin     SubLingual 0.4 milliGRAM(s) SubLingual every 5 minutes PRN  ondansetron Injectable 4 milliGRAM(s) IV Push every 8 hours PRN  pantoprazole    Tablet 40 milliGRAM(s) Oral before breakfast      Review of Systems:  Negative except for HPI    Relevant Family History:   FAMILY HISTORY:  FHx: diabetes mellitus  sister    Family history of breast cancer in mother  Mother  at age 55 of breast cancer    FH: lung cancer  Father  of Lung Cancer @ age 55; Brother currently has lung cancer    FH: heart attack (Grandparent)  materanal grandma with MI 41yo,  60        Relevant Social History: Alcohol ( -) , Tobacco ( -) , Illicit drugs (- )     Physical Exam:    Vital Signs:  Vital Signs Last 24 Hrs  T(C): 36.7 (2023 11:01), Max: 36.8 (15 Apr 2023 16:15)  T(F): 98 (2023 11:01), Max: 98.2 (15 Apr 2023 16:15)  HR: 54 (2023 11:01) (49 - 76)  BP: 131/81 (2023 11:01) (106/71 - 133/78)  BP(mean): --  RR: 18 (2023 11:01) (18 - 18)  SpO2: 98% (2023 11:01) (97% - 99%)    Parameters below as of 2023 11:01  Patient On (Oxygen Delivery Method): room air      Daily     Daily     General:  Appears stated age, well-groomed, well-nourished, no distress  HEENT:  NC/AT,  conjunctivae clear and pink, no thyromegaly, nodules, adenopathy, no JVD, anicteric sclera  Chest:  Full & symmetric excursion, no increased effort, breath sounds clear  Cardiovascular:  Regular rhythm, S1, S2, no murmur/rub/S3/S4, no abdominal bruit, no edema  Abdomen:  Soft, non tender, non distended, normoactive bowel sounds,  no masses , no hepatosplenomegaly, no signs of chronic liver disease  Extremities:  no cyanosis, clubbing or edema  Skin:  No rash/erythema/ecchymoses/petechiae/wounds/abscess/warm/dry  Neuro/Psych:  Alert, oriented, no asterixis, no tremor, no encephalopathy    Laboratory:                          13.6   9.20  )-----------( 119      ( 15 Apr 2023 08:35 )             42.0         137  |  107  |  25<H>  ----------------------------<  119<H>  3.9   |  23  |  1.15    Ca    9.1      2023 21:30    TPro  7.0  /  Alb  3.8  /  TBili  0.2  /  DBili  x   /  AST  18  /  ALT  21  /  AlkPhos  83      LIVER FUNCTIONS - ( 2023 21:30 )  Alb: 3.8 g/dL / Pro: 7.0 gm/dL / ALK PHOS: 83 U/L / ALT: 21 U/L / AST: 18 U/L / GGT: x                   Intake and Output    04-15-23 @ 07:23 @ 07:00  --------------------------------------------------------  IN: 1096 mL / OUT: 0 mL / NET: 1096 mL    23 @ 07:01  23 @ 13:28  --------------------------------------------------------  IN: 350 mL / OUT: 0 mL / NET: 350 mL        Imaging:    < from: US Duplex Carotid Arteries Complete, Bilateral (04.15.23 @ 12:15) >    ACC: [id number] EXAM:  US DPLX CAROTIDS COMPL BI   ORDERED BY: MAGED ELIZABETH     PROCEDURE DATE:  04/15/2023          INTERPRETATION:  CLINICAL INFORMATION: Syncope    COMPARISON: None available.    TECHNIQUE: Grayscale, color and spectral Doppler examination of both   carotid arteries was performed.    FINDINGS:    No abnormal waveforms are encountered. Bilateral intimal thickening.   Right ICA stent crossing the bulb is patent. No elevated velocities   within the stent, however visible in-stent luminal narrowing at the level   of the proximal ICA. Moderate left carotid plaque.    Peak systolic velocities are as follows:    RIGHT:  PROX CCA = 82 cm/s  DIST CCA = 66 cm/s  PROX ICA = 60 cm/s  DIST ICA = 96 cm/s  ECA = 123 cm/s    LEFT:  PROX CCA = 93 cm/s  DIST CCA = 72 cm/s  PROX ICA = 183 cm/s  DIST ICA = 120 cm/s  ECA = 267 cm/s    Antegrade flow is noted within both vertebral arteries.    IMPRESSION:  Patent right ICA stent crossing the bulb. No elevated velocities within   the stent, however visible in-stent luminal narrowing is seen at the   level of the proximal ICA.  Moderate left carotid bulb plaque with 50-69% left internal carotid   artery stenosis.  Left ECA stenosis.    Measurement of carotid stenosis is based on velocity parameters that   correlate the residual internal carotid diameter with that of the more   distal vessel in accordance with a method such as the [place] [employer]   Symptomatic Carotid Endarterectomy Trial (NASCET).      < end of copied text >

## 2023-04-17 PROCEDURE — 99233 SBSQ HOSP IP/OBS HIGH 50: CPT

## 2023-04-17 RX ORDER — DIPHENHYDRAMINE HCL 50 MG
25 CAPSULE ORAL ONCE
Refills: 0 | Status: COMPLETED | OUTPATIENT
Start: 2023-04-17 | End: 2023-04-17

## 2023-04-17 RX ADMIN — Medication 1 PATCH: at 11:18

## 2023-04-17 RX ADMIN — Medication 400 MILLIGRAM(S): at 19:59

## 2023-04-17 RX ADMIN — Medication 1 PATCH: at 11:07

## 2023-04-17 RX ADMIN — Medication 400 MILLIGRAM(S): at 10:20

## 2023-04-17 RX ADMIN — Medication 81 MILLIGRAM(S): at 11:05

## 2023-04-17 RX ADMIN — SENNA PLUS 2 TABLET(S): 8.6 TABLET ORAL at 01:02

## 2023-04-17 RX ADMIN — Medication 400 MILLIGRAM(S): at 12:13

## 2023-04-17 RX ADMIN — PANTOPRAZOLE SODIUM 40 MILLIGRAM(S): 20 TABLET, DELAYED RELEASE ORAL at 06:20

## 2023-04-17 RX ADMIN — CLOPIDOGREL BISULFATE 75 MILLIGRAM(S): 75 TABLET, FILM COATED ORAL at 11:06

## 2023-04-17 RX ADMIN — ISOSORBIDE MONONITRATE 30 MILLIGRAM(S): 60 TABLET, EXTENDED RELEASE ORAL at 11:07

## 2023-04-17 RX ADMIN — ATORVASTATIN CALCIUM 80 MILLIGRAM(S): 80 TABLET, FILM COATED ORAL at 22:54

## 2023-04-17 RX ADMIN — ENOXAPARIN SODIUM 40 MILLIGRAM(S): 100 INJECTION SUBCUTANEOUS at 06:20

## 2023-04-17 RX ADMIN — POLYETHYLENE GLYCOL 3350 17 GRAM(S): 17 POWDER, FOR SOLUTION ORAL at 22:55

## 2023-04-17 RX ADMIN — Medication 400 MILLIGRAM(S): at 02:10

## 2023-04-17 RX ADMIN — Medication 650 MILLIGRAM(S): at 14:44

## 2023-04-17 RX ADMIN — Medication 400 MILLIGRAM(S): at 01:02

## 2023-04-17 RX ADMIN — LISINOPRIL 20 MILLIGRAM(S): 2.5 TABLET ORAL at 05:30

## 2023-04-17 RX ADMIN — Medication 25 MILLIGRAM(S): at 22:54

## 2023-04-17 NOTE — PROGRESS NOTE ADULT - ASSESSMENT
62y Female with significant PMH of HTN, HLD, CAD s/p multiple stents, right carotid stenosis s/p R TCAR, PTSD, bipolar disorder, chronic thrombocytopenia, anxiety and others presented to the ED s/p syncopal episode and chest pain for further evaluation and management.             # Chest pain and syncope with ST depression in inferolateral leads.   Troponin x2 negative. BNP 84   Echo (4/15): Norm LVSF, mild MR, TR    Carotid Doppler: Moderate left carotid bulb plaque with 50-69%, left internal carotid       artery stenosis. Left ECA stenosis. No elevated velocities    Appreciate Vasc Surg rec: no vascular intervention warranted, CTA head and neck cancelled   Telemetry   SL nitro prn.   Continue ASA, Plavix and Statin.   cardio c/s appreciated       #Bradycardia overnight   HR round mid 50s o/N, no symptoms, continue tele monitor       # Elevated D-dimer.   CTA chest negative for PE or any infiltrates.     # Leukocytosis, likely reactive.   Afebrile, trend labs, monitor clinically        # HTN, CAD, h/o cardiac stent, h/o right carotid stent and HPL.   Continue her ASA, Plavix, Toprol-XL, Zestril, Imdur and Crestor.     # Chronic thrombocytopenia.   Continue to monitor.       # Tobacco, alcohol and medical marijuana abuse.   Smoking cessation counseling.   Supportive care.        # DVT prophylaxis: Lovenox sq daily.    62y Female with significant PMH of HTN, HLD, CAD s/p multiple stents, right carotid stenosis s/p R TCAR, PTSD, bipolar disorder, chronic thrombocytopenia, anxiety and others presented to the ED s/p syncopal episode and chest pain for further evaluation and management.             # Chest pain and syncope with ST depression in inferolateral leads.   Troponin x2 negative. BNP 84   Echo (4/15): Norm LVSF, mild MR, TR    Carotid Doppler: Moderate left carotid bulb plaque with 50-69%, left internal carotid       artery stenosis. Left ECA stenosis. No elevated velocities    Appreciate Vasc Surg rec: no vascular intervention warranted, CTA head and neck cancelled   Now off Tele   SL nitro prn.   Continue ASA, Plavix and Statin   cardio c/s appreciated       #Bradycardia overnight   HR round mid 50s o/N, no symptoms  Now off tele monitor       # Elevated D-dimer.   CTA chest negative for PE or any infiltrates.     # Leukocytosis, likely reactive.   Afebrile, trend labs, monitor clinically        # HTN, CAD, h/o cardiac stent, h/o right carotid stent and HPL.   Continue her ASA, Plavix, Toprol-XL, Zestril, Imdur and Crestor.     # Chronic thrombocytopenia.   Continue to monitor      # Tobacco, alcohol and medical marijuana abuse.   Smoking cessation counseling.   Supportive care     # DVT prophylaxis: Lovenox sq daily.       If remains stable consider d/c in next 24hr

## 2023-04-17 NOTE — PROGRESS NOTE ADULT - SUBJECTIVE AND OBJECTIVE BOX
Division of Hospital Medicine  Mehul Hercules MD  [employer]    Patient is a 62y old  Female who presents with a chief complaint of Chest pain and abnormal EKG. (16 Apr 2023 13:27)      SUBJECTIVE / OVERNIGHT EVENTS: resting comfortably in bed, HR mid 50s, this AM 51  ADDITIONAL REVIEW OF SYSTEMS:    MEDICATIONS  (STANDING):  aspirin enteric coated 81 milliGRAM(s) Oral daily  atorvastatin 80 milliGRAM(s) Oral at bedtime  clopidogrel Tablet 75 milliGRAM(s) Oral daily  enoxaparin Injectable 40 milliGRAM(s) SubCutaneous every 24 hours  isosorbide   mononitrate ER Tablet (IMDUR) 30 milliGRAM(s) Oral daily  lisinopril 20 milliGRAM(s) Oral daily  metoprolol succinate ER 25 milliGRAM(s) Oral daily  nicotine - 21 mG/24Hr(s) Patch 1 Patch Transdermal daily  pantoprazole    Tablet 40 milliGRAM(s) Oral before breakfast  senna 2 Tablet(s) Oral at bedtime    MEDICATIONS  (PRN):  acetaminophen     Tablet .. 650 milliGRAM(s) Oral every 6 hours PRN Temp greater or equal to 38C (100.4F), Mild Pain (1 - 3)  aluminum hydroxide/magnesium hydroxide/simethicone Suspension 30 milliLiter(s) Oral every 4 hours PRN Dyspepsia  ibuprofen  Tablet. 400 milliGRAM(s) Oral every 8 hours PRN Moderate Pain (4 - 6)  melatonin 3 milliGRAM(s) Oral at bedtime PRN Insomnia  nitroglycerin     SubLingual 0.4 milliGRAM(s) SubLingual every 5 minutes PRN Chest Pain  ondansetron Injectable 4 milliGRAM(s) IV Push every 8 hours PRN Nausea and/or Vomiting  polyethylene glycol 3350 17 Gram(s) Oral every 24 hours PRN Constipation      CAPILLARY BLOOD GLUCOSE        I&O's Summary    16 Apr 2023 07:01  -  17 Apr 2023 07:00  --------------------------------------------------------  IN: 590 mL / OUT: 0 mL / NET: 590 mL        PHYSICAL EXAM:  Vital Signs Last 24 Hrs  T(C): 37.1 (17 Apr 2023 10:04), Max: 37.6 (16 Apr 2023 16:20)  T(F): 98.7 (17 Apr 2023 10:04), Max: 99.7 (16 Apr 2023 16:20)  HR: 51 (17 Apr 2023 10:04) (51 - 66)  BP: 137/79 (17 Apr 2023 10:04) (122/77 - 141/74)  BP(mean): --  RR: 18 (17 Apr 2023 10:04) (18 - 18)  SpO2: 97% (17 Apr 2023 10:04) (96% - 98%)    Parameters below as of 17 Apr 2023 10:04  Patient On (Oxygen Delivery Method): room air      CONSTITUTIONAL: NAD, well-developed, well-groomed  EYES: PERRLA; conjunctiva and sclera clear  ENMT: Moist oral mucosa, no pharyngeal injection or exudates; normal dentition  NECK: Supple, no palpable masses; no thyromegaly  RESPIRATORY: Normal respiratory effort; lungs are clear to auscultation bilaterally  CARDIOVASCULAR: Regular rate and rhythm, normal S1 and S2, no murmur/rub/gallop; No lower extremity edema; Peripheral pulses are 2+ bilaterally  ABDOMEN: Nontender to palpation, normoactive bowel sounds, no rebound/guarding; No hepatosplenomegaly  MUSCLOSKELETAL:  Normal gait; no clubbing or cyanosis of digits; no joint swelling or tenderness to palpation  PSYCH: A+O to person, place, and time; affect appropriate  NEUROLOGY: CN 2-12 are intact and symmetric; no gross sensory deficits;   SKIN: No rashes; no palpable lesions    LABS:                      RADIOLOGY & ADDITIONAL TESTS:  Results Reviewed:   Imaging Personally Reviewed:  Electrocardiogram Personally Reviewed:    COORDINATION OF CARE:  Care Discussed with Consultants/Other Providers [Y/N]:  Prior or Outpatient Records Reviewed [Y/N]:   Division of Hospital Medicine  Mehul Hercules MD  [employer] Teams    Patient is a 62y old  Female who presents with a chief complaint of Chest pain and abnormal EKG. (16 Apr 2023 13:27)      SUBJECTIVE / OVERNIGHT EVENTS: resting comfortably in bed, HR mid 50s, this AM HR 51, asymptomatic   ADDITIONAL REVIEW OF SYSTEMS:    MEDICATIONS  (STANDING):  aspirin enteric coated 81 milliGRAM(s) Oral daily  atorvastatin 80 milliGRAM(s) Oral at bedtime  clopidogrel Tablet 75 milliGRAM(s) Oral daily  enoxaparin Injectable 40 milliGRAM(s) SubCutaneous every 24 hours  isosorbide   mononitrate ER Tablet (IMDUR) 30 milliGRAM(s) Oral daily  lisinopril 20 milliGRAM(s) Oral daily  metoprolol succinate ER 25 milliGRAM(s) Oral daily  nicotine - 21 mG/24Hr(s) Patch 1 Patch Transdermal daily  pantoprazole    Tablet 40 milliGRAM(s) Oral before breakfast  senna 2 Tablet(s) Oral at bedtime    MEDICATIONS  (PRN):  acetaminophen     Tablet .. 650 milliGRAM(s) Oral every 6 hours PRN Temp greater or equal to 38C (100.4F), Mild Pain (1 - 3)  aluminum hydroxide/magnesium hydroxide/simethicone Suspension 30 milliLiter(s) Oral every 4 hours PRN Dyspepsia  ibuprofen  Tablet. 400 milliGRAM(s) Oral every 8 hours PRN Moderate Pain (4 - 6)  melatonin 3 milliGRAM(s) Oral at bedtime PRN Insomnia  nitroglycerin     SubLingual 0.4 milliGRAM(s) SubLingual every 5 minutes PRN Chest Pain  ondansetron Injectable 4 milliGRAM(s) IV Push every 8 hours PRN Nausea and/or Vomiting  polyethylene glycol 3350 17 Gram(s) Oral every 24 hours PRN Constipation      CAPILLARY BLOOD GLUCOSE        I&O's Summary    16 Apr 2023 07:01  -  17 Apr 2023 07:00  --------------------------------------------------------  IN: 590 mL / OUT: 0 mL / NET: 590 mL        PHYSICAL EXAM:  Vital Signs Last 24 Hrs  T(C): 37.1 (17 Apr 2023 10:04), Max: 37.6 (16 Apr 2023 16:20)  T(F): 98.7 (17 Apr 2023 10:04), Max: 99.7 (16 Apr 2023 16:20)  HR: 51 (17 Apr 2023 10:04) (51 - 66)  BP: 137/79 (17 Apr 2023 10:04) (122/77 - 141/74)  BP(mean): --  RR: 18 (17 Apr 2023 10:04) (18 - 18)  SpO2: 97% (17 Apr 2023 10:04) (96% - 98%)    Parameters below as of 17 Apr 2023 10:04  Patient On (Oxygen Delivery Method): room air      CONSTITUTIONAL: NAD, well-developed, well-groomed  EYES: PERRLA; conjunctiva and sclera clear  ENMT: Moist oral mucosa, no pharyngeal injection or exudates; normal dentition  NECK: Supple, no palpable masses; no thyromegaly  RESPIRATORY: Normal respiratory effort; lungs are clear to auscultation bilaterally  CARDIOVASCULAR: Regular rate and rhythm, normal S1 and S2, no murmur/rub/gallop; No lower extremity edema; Peripheral pulses are 2+ bilaterally  ABDOMEN: Nontender to palpation, normoactive bowel sounds, no rebound/guarding; No hepatosplenomegaly  MUSCLOSKELETAL:  Normal gait; no clubbing or cyanosis of digits; no joint swelling or tenderness to palpation  PSYCH: A+O to person, place, and time; affect appropriate  NEUROLOGY: CN 2-12 are intact and symmetric; no gross sensory deficits;   SKIN: No rashes; no palpable lesions    LABS:                      RADIOLOGY & ADDITIONAL TESTS:  Results Reviewed:   Imaging Personally Reviewed:  Electrocardiogram Personally Reviewed:    COORDINATION OF CARE:  Care Discussed with Consultants/Other Providers [Y/N]:  Prior or Outpatient Records Reviewed [Y/N]:   Division of Hospital Medicine  Mehul Hercules MD  [employer] Teams    Patient is a 62y old  Female who presents with a chief complaint of Chest pain and abnormal EKG. (16 Apr 2023 13:27)      SUBJECTIVE / OVERNIGHT EVENTS: resting comfortably in bed, HR mid 50s, this AM HR 51, asymptomatic   ADDITIONAL REVIEW OF SYSTEMS:    MEDICATIONS  (STANDING):  aspirin enteric coated 81 milliGRAM(s) Oral daily  atorvastatin 80 milliGRAM(s) Oral at bedtime  clopidogrel Tablet 75 milliGRAM(s) Oral daily  enoxaparin Injectable 40 milliGRAM(s) SubCutaneous every 24 hours  isosorbide   mononitrate ER Tablet (IMDUR) 30 milliGRAM(s) Oral daily  lisinopril 20 milliGRAM(s) Oral daily  metoprolol succinate ER 25 milliGRAM(s) Oral daily  nicotine - 21 mG/24Hr(s) Patch 1 Patch Transdermal daily  pantoprazole    Tablet 40 milliGRAM(s) Oral before breakfast  senna 2 Tablet(s) Oral at bedtime    MEDICATIONS  (PRN):  acetaminophen     Tablet .. 650 milliGRAM(s) Oral every 6 hours PRN Temp greater or equal to 38C (100.4F), Mild Pain (1 - 3)  aluminum hydroxide/magnesium hydroxide/simethicone Suspension 30 milliLiter(s) Oral every 4 hours PRN Dyspepsia  ibuprofen  Tablet. 400 milliGRAM(s) Oral every 8 hours PRN Moderate Pain (4 - 6)  melatonin 3 milliGRAM(s) Oral at bedtime PRN Insomnia  nitroglycerin     SubLingual 0.4 milliGRAM(s) SubLingual every 5 minutes PRN Chest Pain  ondansetron Injectable 4 milliGRAM(s) IV Push every 8 hours PRN Nausea and/or Vomiting  polyethylene glycol 3350 17 Gram(s) Oral every 24 hours PRN Constipation      CAPILLARY BLOOD GLUCOSE        I&O's Summary    16 Apr 2023 07:01  -  17 Apr 2023 07:00  --------------------------------------------------------  IN: 590 mL / OUT: 0 mL / NET: 590 mL        PHYSICAL EXAM:  Vital Signs Last 24 Hrs  T(C): 37.1 (17 Apr 2023 10:04), Max: 37.6 (16 Apr 2023 16:20)  T(F): 98.7 (17 Apr 2023 10:04), Max: 99.7 (16 Apr 2023 16:20)  HR: 51 (17 Apr 2023 10:04) (51 - 66)  BP: 137/79 (17 Apr 2023 10:04) (122/77 - 141/74)  BP(mean): --  RR: 18 (17 Apr 2023 10:04) (18 - 18)  SpO2: 97% (17 Apr 2023 10:04) (96% - 98%)    Parameters below as of 17 Apr 2023 10:04  Patient On (Oxygen Delivery Method): room air      CONSTITUTIONAL: NAD, well-developed, well-groomed  EYES: PERRLA; conjunctiva and sclera clear  ENMT: Moist oral mucosa, no pharyngeal injection or exudates; normal dentition  NECK: Supple, no palpable masses; no thyromegaly  RESPIRATORY: Normal respiratory effort; lungs are clear to auscultation bilaterally  CARDIOVASCULAR: Regular rate and rhythm, normal S1 and S2, no murmur/rub/gallop; No lower extremity edema; Peripheral pulses are 2+ bilaterally  ABDOMEN: Nontender to palpation, normoactive bowel sounds, no rebound/guarding; No hepatosplenomegaly  MUSCLOSKELETAL:  Normal gait; no clubbing or cyanosis of digits; no joint swelling or tenderness to palpation  PSYCH: A+O to person, place, and time; affect appropriate  NEUROLOGY: CN 2-12 are intact and symmetric; no gross sensory deficits;   SKIN: No rashes; no palpable lesions    LABS:               Division of Hospital Medicine  Mehul Hercules MD  [employer] Teams    Patient is a 62y old  Female who presents with a chief complaint of Chest pain and abnormal EKG. (16 Apr 2023 13:27)      SUBJECTIVE / OVERNIGHT EVENTS: resting comfortably in bed, HR mid 50s, asymptomatic, pt states this BL   ADDITIONAL REVIEW OF SYSTEMS:    MEDICATIONS  (STANDING):  aspirin enteric coated 81 milliGRAM(s) Oral daily  atorvastatin 80 milliGRAM(s) Oral at bedtime  clopidogrel Tablet 75 milliGRAM(s) Oral daily  enoxaparin Injectable 40 milliGRAM(s) SubCutaneous every 24 hours  isosorbide   mononitrate ER Tablet (IMDUR) 30 milliGRAM(s) Oral daily  lisinopril 20 milliGRAM(s) Oral daily  metoprolol succinate ER 25 milliGRAM(s) Oral daily  nicotine - 21 mG/24Hr(s) Patch 1 Patch Transdermal daily  pantoprazole    Tablet 40 milliGRAM(s) Oral before breakfast  senna 2 Tablet(s) Oral at bedtime    MEDICATIONS  (PRN):  acetaminophen     Tablet .. 650 milliGRAM(s) Oral every 6 hours PRN Temp greater or equal to 38C (100.4F), Mild Pain (1 - 3)  aluminum hydroxide/magnesium hydroxide/simethicone Suspension 30 milliLiter(s) Oral every 4 hours PRN Dyspepsia  ibuprofen  Tablet. 400 milliGRAM(s) Oral every 8 hours PRN Moderate Pain (4 - 6)  melatonin 3 milliGRAM(s) Oral at bedtime PRN Insomnia  nitroglycerin     SubLingual 0.4 milliGRAM(s) SubLingual every 5 minutes PRN Chest Pain  ondansetron Injectable 4 milliGRAM(s) IV Push every 8 hours PRN Nausea and/or Vomiting  polyethylene glycol 3350 17 Gram(s) Oral every 24 hours PRN Constipation      CAPILLARY BLOOD GLUCOSE        I&O's Summary    16 Apr 2023 07:01  -  17 Apr 2023 07:00  --------------------------------------------------------  IN: 590 mL / OUT: 0 mL / NET: 590 mL        PHYSICAL EXAM:  Vital Signs Last 24 Hrs  T(C): 37.1 (17 Apr 2023 10:04), Max: 37.6 (16 Apr 2023 16:20)  T(F): 98.7 (17 Apr 2023 10:04), Max: 99.7 (16 Apr 2023 16:20)  HR: 51 (17 Apr 2023 10:04) (51 - 66)  BP: 137/79 (17 Apr 2023 10:04) (122/77 - 141/74)  BP(mean): --  RR: 18 (17 Apr 2023 10:04) (18 - 18)  SpO2: 97% (17 Apr 2023 10:04) (96% - 98%)    Parameters below as of 17 Apr 2023 10:04  Patient On (Oxygen Delivery Method): room air      CONSTITUTIONAL: NAD, well-developed, well-groomed  EYES: PERRLA; conjunctiva and sclera clear  ENMT: Moist oral mucosa,   NECK: Supple, no palpable masses  RESPIRATORY: Normal respiratory effort; lungs are clear to auscultation bilaterally  CARDIOVASCULAR: bradycardic, normal S1 and S2, no murmur/rub/gallop; No lower extremity edema  ABDOMEN: Nontender to palpation, normoactive bowel sounds  MUSCLOSKELETAL:  Normal gait; no clubbing or cyanosis of digits; no joint swelling or tenderness to palpation  PSYCH: A+O to person, place, and time; affect appropriate  NEUROLOGY: CN 2-12 are intact and symmetric; no gross sensory deficits;   SKIN: No rashes; no palpable lesions    LABS:

## 2023-04-18 ENCOUNTER — TRANSCRIPTION ENCOUNTER (OUTPATIENT)
Age: 63
End: 2023-04-18

## 2023-04-18 VITALS
DIASTOLIC BLOOD PRESSURE: 77 MMHG | OXYGEN SATURATION: 96 % | SYSTOLIC BLOOD PRESSURE: 155 MMHG | TEMPERATURE: 98 F | HEART RATE: 57 BPM | RESPIRATION RATE: 18 BRPM

## 2023-04-18 LAB
ANION GAP SERPL CALC-SCNC: 3 MMOL/L — LOW (ref 5–17)
BUN SERPL-MCNC: 22 MG/DL — SIGNIFICANT CHANGE UP (ref 7–23)
CALCIUM SERPL-MCNC: 9.3 MG/DL — SIGNIFICANT CHANGE UP (ref 8.5–10.1)
CHLORIDE SERPL-SCNC: 110 MMOL/L — HIGH (ref 96–108)
CO2 SERPL-SCNC: 27 MMOL/L — SIGNIFICANT CHANGE UP (ref 22–31)
CREAT SERPL-MCNC: 1.01 MG/DL — SIGNIFICANT CHANGE UP (ref 0.5–1.3)
EGFR: 63 ML/MIN/1.73M2 — SIGNIFICANT CHANGE UP
GLUCOSE SERPL-MCNC: 95 MG/DL — SIGNIFICANT CHANGE UP (ref 70–99)
HCT VFR BLD CALC: 45.5 % — HIGH (ref 34.5–45)
HGB BLD-MCNC: 14.9 G/DL — SIGNIFICANT CHANGE UP (ref 11.5–15.5)
INR BLD: 0.93 RATIO — SIGNIFICANT CHANGE UP (ref 0.88–1.16)
MCHC RBC-ENTMCNC: 32.1 PG — SIGNIFICANT CHANGE UP (ref 27–34)
MCHC RBC-ENTMCNC: 32.7 G/DL — SIGNIFICANT CHANGE UP (ref 32–36)
MCV RBC AUTO: 98.1 FL — SIGNIFICANT CHANGE UP (ref 80–100)
NRBC # BLD: 0 /100 WBCS — SIGNIFICANT CHANGE UP (ref 0–0)
PLATELET # BLD AUTO: 125 K/UL — LOW (ref 150–400)
POTASSIUM SERPL-MCNC: 4.5 MMOL/L — SIGNIFICANT CHANGE UP (ref 3.5–5.3)
POTASSIUM SERPL-SCNC: 4.5 MMOL/L — SIGNIFICANT CHANGE UP (ref 3.5–5.3)
PROTHROM AB SERPL-ACNC: 11 SEC — SIGNIFICANT CHANGE UP (ref 10.5–13.4)
RBC # BLD: 4.64 M/UL — SIGNIFICANT CHANGE UP (ref 3.8–5.2)
RBC # FLD: 12.3 % — SIGNIFICANT CHANGE UP (ref 10.3–14.5)
SODIUM SERPL-SCNC: 140 MMOL/L — SIGNIFICANT CHANGE UP (ref 135–145)
WBC # BLD: 7.92 K/UL — SIGNIFICANT CHANGE UP (ref 3.8–10.5)
WBC # FLD AUTO: 7.92 K/UL — SIGNIFICANT CHANGE UP (ref 3.8–10.5)

## 2023-04-18 PROCEDURE — 99238 HOSP IP/OBS DSCHRG MGMT 30/<: CPT

## 2023-04-18 RX ORDER — METOPROLOL TARTRATE 50 MG
25 TABLET ORAL DAILY
Refills: 0 | Status: DISCONTINUED | OUTPATIENT
Start: 2023-04-18 | End: 2023-04-18

## 2023-04-18 RX ORDER — LORATADINE 10 MG/1
1 TABLET ORAL
Qty: 0 | Refills: 0 | DISCHARGE

## 2023-04-18 RX ORDER — POLYETHYLENE GLYCOL 3350 17 G/17G
17 POWDER, FOR SOLUTION ORAL DAILY
Refills: 0 | Status: DISCONTINUED | OUTPATIENT
Start: 2023-04-18 | End: 2023-04-18

## 2023-04-18 RX ORDER — ISOSORBIDE MONONITRATE 60 MG/1
1 TABLET, EXTENDED RELEASE ORAL
Qty: 0 | Refills: 0 | DISCHARGE
Start: 2023-04-18

## 2023-04-18 RX ORDER — ISOSORBIDE MONONITRATE 60 MG/1
1 TABLET, EXTENDED RELEASE ORAL
Qty: 0 | Refills: 0 | DISCHARGE

## 2023-04-18 RX ADMIN — POLYETHYLENE GLYCOL 3350 17 GRAM(S): 17 POWDER, FOR SOLUTION ORAL at 12:26

## 2023-04-18 RX ADMIN — Medication 400 MILLIGRAM(S): at 09:45

## 2023-04-18 RX ADMIN — CLOPIDOGREL BISULFATE 75 MILLIGRAM(S): 75 TABLET, FILM COATED ORAL at 12:26

## 2023-04-18 RX ADMIN — Medication 81 MILLIGRAM(S): at 13:44

## 2023-04-18 RX ADMIN — Medication 400 MILLIGRAM(S): at 10:50

## 2023-04-18 RX ADMIN — Medication 650 MILLIGRAM(S): at 01:26

## 2023-04-18 RX ADMIN — Medication 1 PATCH: at 07:27

## 2023-04-18 RX ADMIN — ISOSORBIDE MONONITRATE 30 MILLIGRAM(S): 60 TABLET, EXTENDED RELEASE ORAL at 12:26

## 2023-04-18 RX ADMIN — Medication 1 PATCH: at 12:26

## 2023-04-18 RX ADMIN — Medication 1 PATCH: at 11:22

## 2023-04-18 RX ADMIN — PANTOPRAZOLE SODIUM 40 MILLIGRAM(S): 20 TABLET, DELAYED RELEASE ORAL at 06:04

## 2023-04-18 NOTE — DISCHARGE NOTE PROVIDER - DISCHARGE DATE
On 08/09/22, IArchana scribed the services personally performed by Hector Mccurdy MD    No chief complaint on file.      Visit: Initial    Provider Requesting Consult:  Dr. Romy Perrin    SUBJECTIVE:  Eusebia Carney is a 54 year old female who presents for evaluation of runny nose and a growth on the nose. Today, patient reports ***      She has a long-standing h/o of {SINUSITIS SYMPTOMS:585077}. She has tried OTC medications/decongestants, antibiotics with no significant relief. She notes that her symptoms are primarily {tsbilateral:985374}. Patient {DOES/ DOES NOT:47198} have a history of headaches. She gets an average of {NUMBER 1-10, NONE, UNKNOWN:960438} sinus infections annually.  Patient also {DOES/ DOES NOT:21054} have a history of environmental allergies and {HAS HAS NOT:29354} had allergy testing in past. She has had imaging done.     REVIEW OF SYSTEMS:   --General: Pt denies problems with fever, night sweats, weight changes, appetite changes and sleep problems  --HEET: as above  --Respiratory: Pt denies problems with coughing, wheezing, changes in voice,  nor shortness of breath    --Cardiovascular: Pt denies problems with chest pain, palpitations or other cardiac complaints  --Gastrointestinal: Pt denies problems with diarrhea, constipation, abdominal pain or other complaints  --Genitourinary: Pt denies problems with urinary pain, bleeding and voiding problems  --Musculoskeletal: Pt denies problems with pain, swelling, weakness or limitation of motion  --Neurologic:Pt denies problems with syncope, seizures, paralysis, involuntary movements or gait  --Psychiatric: Pt denies problems with sleep, anxiety, or depression  --Hematologic/Lymphatic/Immunologic: Pt. denies hematological, lymphatic and immunological problems  --Endocrine: Pt. denies thyroid and diabetic problems  --Skin: No problems with scalp lesions. No rash    Patient Active Problem List   Diagnosis   • DCIS (ductal carcinoma  in situ) of breast   • Postoperative breast asymmetry   • Stress incontinence, female   • Alport's syndrome   • Cavovarus deformity of foot       Past Medical History:   Diagnosis Date   • Abnormal facial hair    • Asthma    • DCIS (ductal carcinoma in situ)    • Essential hypertension 10/27/2010   • Fibromyalgia    • Seasonal allergies    • Stress incontinence        Past Surgical History:   Procedure Laterality Date   • Biopsy of skin lesion  2012    basal cell    • Breast lumpectomy Right 2013   • Leep     • Vaginal delivery      x2       Allergies:  ALLERGIES:   Allergen Reactions   • Sulfa Antibiotics RASH     HIVES       Medications:   Current Outpatient Medications   Medication Sig Dispense Refill   • cyclobenzaprine (FLEXERIL) 5 MG tablet Take 1 tablet by mouth at bedtime as needed for Muscle spasms. 30 tablet 5   • atenolol (TENORMIN) 25 MG tablet Take 0.5 tablets by mouth daily. 45 tablet 3   • Multiple Vitamins-Minerals (MULTIVITAMIN ADULT PO) Take 1 tablet by mouth daily.     • cetirizine (ZYRTEC) 10 MG tablet Take 10 mg by mouth daily.      • Cholecalciferol (VITAMIN D) 2000 units capsule        No current facility-administered medications for this visit.       Social History:   Social History     Socioeconomic History   • Marital status: /Civil Union     Spouse name: Not on file   • Number of children: Not on file   • Years of education: Not on file   • Highest education level: Not on file   Occupational History   • Not on file   Tobacco Use   • Smoking status: Never Smoker   • Smokeless tobacco: Never Used   Vaping Use   • Vaping Use: never used   Substance and Sexual Activity   • Alcohol use: Yes     Comment: rare   • Drug use: No   • Sexual activity: Yes     Partners: Male     Birth control/protection: None   Other Topics Concern   • Not on file   Social History Narrative   • Not on file     Social Determinants of Health     Financial Resource Strain: Not on file   Food Insecurity: Not on  file   Transportation Needs: Not on file   Physical Activity: Not on file   Stress: Not on file   Social Connections: Not on file   Intimate Partner Violence: Not on file       Family History:  Family History   Problem Relation Age of Onset   • Musculoskeletal Mother    • Cancer Father         Bladder   • Diabetes Maternal Grandmother    • Hypertension Maternal Grandmother    • Stroke Maternal Grandmother    • Cancer, Colon Maternal Grandfather    • Asthma Paternal Grandmother    • COPD Paternal Grandfather    • Cancer, Breast Paternal Great-Grandmother    • Cancer, Ovarian Neg Hx        No significant family history related to Head and neck complaints    PHYSICAL EXAMINATION:   Visit Vitals  LMP 07/01/2019       --General appearance::Well developed, well nourished, in no apparent distress  --Ability to communicate: Appropriate  --Head and scalp: No scalp lesions  --Eyes: No redness, swelling or drainage.  --Ears:     R ear: EAC patent, tympanic membrane intact, no middle ear effusion, no otorrhea    L ear: EAC patent, tympanic membrane intact, no middle ear effusion, no otorrhea  --Oral Cavity/Oral Pharynx: No buccal mucosal lesions, no pigmented mucosal lesions, palatal elevation symmetric, tongue motion intact, no floor of mouth palpable masses, no posterior pharyngeal wall fullness  --Neck: Trachea midline, no palpable thyroid nodules  --Skin: No pigmented lesions on face, neck  --Psychiatric: Oriented to time, place and person  --Lymphatic: No palpable cervical adenopathy     Procedure Note: ***    Procedure: Nasal endoscopy, diagnostic  Indication: Chronic sinusitis   EBL: None  Complications: None    Patient was topically vasoconstricted and anesthetized with afrin and 1% lidocaine respectively. Rigid nasal endoscopy was done using a 0 degree rigid nasal endoscope.    Right side: Septum midline.  Inferior turbinate {Desc; not/mildly/moderately/severely :76453} hypertrophied. Middle meatus and sphenoethmoid  recess show {MILD/MODERATE:240623} edema, and no purulence or polyps. Nasopharynx clear without mass or lesion.      Left side: Septum midline.  Inferior turbinate {Desc; not/mildly/moderately/severely :94688} hypertrophied.  Middle meatus and sphenoethmoid recess show  {MILD/MODERATE:027086} edema, and no purulence or polyps. Nasopharynx clear without mass or lesion.      Patient tolerated procedure well with minimal discomfort.    ASSESSMENT/PLAN:  Chronic rhinosinusitis    PLAN:  - Nasal endoscopy completed in office today and results were explained to patient as above.    - Start Flonase nasal spray, {tsflonase:932140}.  Patient educated on proper administration.    - Sinus culture taken today, will follow up results and adjust antibiotics as appropriate. Patient will be notified of results in near future.  - Start nasal hygiene regimen with nasal saline (2 sprays each nostril 4-5x daily) and Nish Med Sinus rinse irrigations once daily  - Plan for CT maxillofacial (fine cut, with direct coronal and axial cuts) in 1 months time  - RTC 6 weeks for imaging and symptomatology review  - Discussed with patient treatment plan as above, and patient agreeable.  All patient's questions and concerns were addressed and answered.     Hector Mccurdy MD    Scribe: Electronically signed: Archana Faulkner has scribed for Dr. Mccurdy, 08/09/22  I have reviewed and edited the progress note and agree with what has been scribed. Electronically signed by: ***,  08/09/22     18-Apr-2023

## 2023-04-18 NOTE — DISCHARGE NOTE NURSING/CASE MANAGEMENT/SOCIAL WORK - NSDCPEWEB_GEN_ALL_CORE
[facility] for Tobacco Control website --- [email or web address] website --- [email or web address]

## 2023-04-18 NOTE — DISCHARGE NOTE PROVIDER - NSDCMRMEDTOKEN_GEN_ALL_CORE_FT
aspirin 81 mg oral delayed release tablet: 1 tab(s) orally once a day am  Crestor 20 mg oral tablet: 1 tab(s) orally once a day am  isosorbide mononitrate 30 mg oral tablet, extended release: 1 tab(s) orally once a day  Metoprolol Succinate ER 25 mg oral tablet, extended release: 1 tab(s) orally once a day am  nicotine 21 mg/24 hr transdermal film, extended release: 1 patch transdermal once a day  Plavix 75 mg oral tablet: 1 tab(s) orally once a day am  Protonix 20 mg oral delayed release tablet: 1 tab(s) orally once a day am  Zestril 30 mg oral tablet: 1 tab(s) orally once a day am

## 2023-04-18 NOTE — DISCHARGE NOTE PROVIDER - NSDCFUSCHEDAPPT_GEN_ALL_CORE_FT
[facility]  VASCULAR [address]  Scheduled Appointment: 04/20/2023    [facility]  VASCULAR [address]  Scheduled Appointment: 04/20/2023

## 2023-04-18 NOTE — DISCHARGE NOTE NURSING/CASE MANAGEMENT/SOCIAL WORK - PATIENT PORTAL LINK FT
You can access the FollowMyHealth Patient Portal offered by [facility] by registering at the following website: [email or web address]. By joining [email or web address] FollowMyHealth portal, you will also be able to view your health information using other applications (apps) compatible with our system.

## 2023-04-18 NOTE — DISCHARGE NOTE NURSING/CASE MANAGEMENT/SOCIAL WORK - NSDCPEEMAIL_GEN_ALL_CORE
[facility] for Tobacco Control email [email or web address]

## 2023-04-18 NOTE — DISCHARGE NOTE PROVIDER - HOSPITAL COURSE
2y Female with significant PMH of HTN, HLD, CAD s/p multiple stents, right carotid stenosis s/p R TCAR, PTSD, bipolar disorder, chronic thrombocytopenia, anxiety and others presented to the ED s/p syncopal episode and chest pain for further evaluation and management.             # Chest pain and syncope with ST depression in inferolateral leads.   Troponin x2 negative. BNP 84   Echo (4/15): Norm LVSF, mild MR, TR    Carotid Doppler: Moderate left carotid bulb plaque with 50-69%, left internal carotid       artery stenosis. Left ECA stenosis. No elevated velocities    Appreciate Vasc Surg rec: no vascular intervention warranted, CTA head and neck cancelled   Now off Tele   SL nitro prn.   Continue ASA, Plavix and Statin   cardio c/s appreciated       #Bradycardia overnight   HR round mid 50s o/N, no symptoms  Now off tele monitor       # Elevated D-dimer.   CTA chest negative for PE or any infiltrates.     # Leukocytosis, likely reactive.   Afebrile, trend labs, monitor clinically        # HTN, CAD, h/o cardiac stent, h/o right carotid stent and HPL.   Continue her ASA, Plavix, Toprol-XL, Zestril, Imdur and Crestor.     # Chronic thrombocytopenia.   Continue to monitor      # Tobacco, alcohol and medical marijuana abuse.   Smoking cessation counseling.   Supportive care     # DVT prophylaxis: Lovenox sq daily.       If remains stable consider d/c in next 24hr

## 2023-04-18 NOTE — DISCHARGE NOTE NURSING/CASE MANAGEMENT/SOCIAL WORK - NSDCPEFALRISK_GEN_ALL_CORE
For information on Fall & Injury Prevention, visit: [email or web address] OR  [email or web address] OR  https://www.cdc.gov/steadi/patient.html

## 2023-04-18 NOTE — DISCHARGE NOTE PROVIDER - NSDCCPCAREPLAN_GEN_ALL_CORE_FT
PRINCIPAL DISCHARGE DIAGNOSIS  Diagnosis: Syncope  Assessment and Plan of Treatment: You have been evaluated by cardiology and Vascular  Please followup with Vascular and Cardiology one week post discharge

## 2023-04-21 DIAGNOSIS — Z95.5 PRESENCE OF CORONARY ANGIOPLASTY IMPLANT AND GRAFT: ICD-10-CM

## 2023-04-21 DIAGNOSIS — F31.9 BIPOLAR DISORDER, UNSPECIFIED: ICD-10-CM

## 2023-04-21 DIAGNOSIS — F41.9 ANXIETY DISORDER, UNSPECIFIED: ICD-10-CM

## 2023-04-21 DIAGNOSIS — Z79.82 LONG TERM (CURRENT) USE OF ASPIRIN: ICD-10-CM

## 2023-04-21 DIAGNOSIS — I65.23 OCCLUSION AND STENOSIS OF BILATERAL CAROTID ARTERIES: ICD-10-CM

## 2023-04-21 DIAGNOSIS — E78.5 HYPERLIPIDEMIA, UNSPECIFIED: ICD-10-CM

## 2023-04-21 DIAGNOSIS — Z79.899 OTHER LONG TERM (CURRENT) DRUG THERAPY: ICD-10-CM

## 2023-04-21 DIAGNOSIS — I10 ESSENTIAL (PRIMARY) HYPERTENSION: ICD-10-CM

## 2023-04-21 DIAGNOSIS — R55 SYNCOPE AND COLLAPSE: ICD-10-CM

## 2023-04-21 DIAGNOSIS — F17.210 NICOTINE DEPENDENCE, CIGARETTES, UNCOMPLICATED: ICD-10-CM

## 2023-04-21 DIAGNOSIS — Z79.02 LONG TERM (CURRENT) USE OF ANTITHROMBOTICS/ANTIPLATELETS: ICD-10-CM

## 2023-04-21 DIAGNOSIS — F43.10 POST-TRAUMATIC STRESS DISORDER, UNSPECIFIED: ICD-10-CM

## 2023-04-21 DIAGNOSIS — R00.1 BRADYCARDIA, UNSPECIFIED: ICD-10-CM

## 2023-04-21 DIAGNOSIS — D69.6 THROMBOCYTOPENIA, UNSPECIFIED: ICD-10-CM

## 2023-04-21 DIAGNOSIS — I25.10 ATHEROSCLEROTIC HEART DISEASE OF NATIVE CORONARY ARTERY WITHOUT ANGINA PECTORIS: ICD-10-CM

## 2023-04-21 DIAGNOSIS — R07.9 CHEST PAIN, UNSPECIFIED: ICD-10-CM

## 2023-04-26 NOTE — HISTORY OF PRESENT ILLNESS
[FreeTextEntry1] : INCOMPLETE NOTE.... \par \par ARABELLA BOWDEN (: Dec 31 1960) is a 62 year old female who presents today for \par \par She is s/p R TCAR (2023)\par No prior history of TIA/stroke.\par Denies focal neurologic deficits including amaurosis fugax, slurred speech, and weakness in the arms/legs. \par \par \par \par PMH: CAD s/p multiple coronary artery stents, SOBE, HTN, HLD, ovarian cystectomy, lumpectomy, perforated duodenal ulcer, anxiety, bipolar disorder, CURRENT smoker??

## 2023-04-27 ENCOUNTER — EMERGENCY (EMERGENCY)
Facility: HOSPITAL | Age: 63
LOS: 1 days | Discharge: HOME | End: 2023-04-27
Attending: EMERGENCY MEDICINE
Payer: MEDICAID

## 2023-04-27 ENCOUNTER — APPOINTMENT (OUTPATIENT)
Dept: VASCULAR SURGERY | Facility: CLINIC | Age: 63
End: 2023-04-27

## 2023-04-27 VITALS
HEIGHT: 64 IN | OXYGEN SATURATION: 94 % | RESPIRATION RATE: 20 BRPM | SYSTOLIC BLOOD PRESSURE: 116 MMHG | HEART RATE: 86 BPM | DIASTOLIC BLOOD PRESSURE: 75 MMHG

## 2023-04-27 DIAGNOSIS — Z98.890 OTHER SPECIFIED POSTPROCEDURAL STATES: Chronic | ICD-10-CM

## 2023-04-27 DIAGNOSIS — Z90.49 ACQUIRED ABSENCE OF OTHER SPECIFIED PARTS OF DIGESTIVE TRACT: Chronic | ICD-10-CM

## 2023-04-27 DIAGNOSIS — D21.9 BENIGN NEOPLASM OF CONNECTIVE AND OTHER SOFT TISSUE, UNSPECIFIED: Chronic | ICD-10-CM

## 2023-04-27 DIAGNOSIS — Z87.42 PERSONAL HISTORY OF OTHER DISEASES OF THE FEMALE GENITAL TRACT: Chronic | ICD-10-CM

## 2023-04-27 DIAGNOSIS — K27.5 CHRONIC OR UNSPECIFIED PEPTIC ULCER, SITE UNSPECIFIED, WITH PERFORATION: Chronic | ICD-10-CM

## 2023-04-27 DIAGNOSIS — Z98.62 PERIPHERAL VASCULAR ANGIOPLASTY STATUS: Chronic | ICD-10-CM

## 2023-04-27 DIAGNOSIS — Z95.5 PRESENCE OF CORONARY ANGIOPLASTY IMPLANT AND GRAFT: Chronic | ICD-10-CM

## 2023-04-27 LAB
ALBUMIN SERPL ELPH-MCNC: 3.8 G/DL — SIGNIFICANT CHANGE UP (ref 3.3–5)
ALP SERPL-CCNC: 63 U/L — SIGNIFICANT CHANGE UP (ref 40–120)
ALT FLD-CCNC: 17 U/L — SIGNIFICANT CHANGE UP (ref 10–45)
ANION GAP SERPL CALC-SCNC: 12 MMOL/L — SIGNIFICANT CHANGE UP (ref 5–17)
APTT BLD: 25.9 SEC — LOW (ref 27.5–35.5)
AST SERPL-CCNC: 44 U/L — HIGH (ref 10–40)
BASE EXCESS BLDV CALC-SCNC: -5.9 MMOL/L — LOW (ref -2–3)
BASOPHILS # BLD AUTO: 0.05 K/UL — SIGNIFICANT CHANGE UP (ref 0–0.2)
BASOPHILS NFR BLD AUTO: 0.4 % — SIGNIFICANT CHANGE UP (ref 0–2)
BILIRUB SERPL-MCNC: 0.3 MG/DL — SIGNIFICANT CHANGE UP (ref 0.2–1.2)
BUN SERPL-MCNC: 11 MG/DL — SIGNIFICANT CHANGE UP (ref 7–23)
CA-I SERPL-SCNC: 1.11 MMOL/L — LOW (ref 1.15–1.33)
CALCIUM SERPL-MCNC: 8.2 MG/DL — LOW (ref 8.4–10.5)
CHLORIDE BLDV-SCNC: 109 MMOL/L — HIGH (ref 96–108)
CHLORIDE SERPL-SCNC: 110 MMOL/L — HIGH (ref 96–108)
CO2 BLDV-SCNC: 22 MMOL/L — SIGNIFICANT CHANGE UP (ref 22–26)
CO2 SERPL-SCNC: 19 MMOL/L — LOW (ref 22–31)
CREAT SERPL-MCNC: 0.67 MG/DL — SIGNIFICANT CHANGE UP (ref 0.5–1.3)
EGFR: 99 ML/MIN/1.73M2 — SIGNIFICANT CHANGE UP
EOSINOPHIL # BLD AUTO: 0.18 K/UL — SIGNIFICANT CHANGE UP (ref 0–0.5)
EOSINOPHIL NFR BLD AUTO: 1.4 % — SIGNIFICANT CHANGE UP (ref 0–6)
ETHANOL SERPL-MCNC: 93 MG/DL — HIGH (ref 0–10)
GAS PNL BLDV: 139 MMOL/L — SIGNIFICANT CHANGE UP (ref 136–145)
GAS PNL BLDV: SIGNIFICANT CHANGE UP
GLUCOSE BLDV-MCNC: 75 MG/DL — SIGNIFICANT CHANGE UP (ref 70–99)
GLUCOSE SERPL-MCNC: 81 MG/DL — SIGNIFICANT CHANGE UP (ref 70–99)
HCO3 BLDV-SCNC: 20 MMOL/L — LOW (ref 22–29)
HCT VFR BLD CALC: 32.4 % — LOW (ref 34.5–45)
HCT VFR BLDA CALC: 35 % — SIGNIFICANT CHANGE UP (ref 34.5–46.5)
HGB BLD CALC-MCNC: 11.7 G/DL — SIGNIFICANT CHANGE UP (ref 11.7–16.1)
HGB BLD-MCNC: 10.8 G/DL — LOW (ref 11.5–15.5)
IMM GRANULOCYTES NFR BLD AUTO: 0.6 % — SIGNIFICANT CHANGE UP (ref 0–0.9)
INR BLD: 1 RATIO — SIGNIFICANT CHANGE UP (ref 0.88–1.16)
LACTATE BLDV-MCNC: 2.3 MMOL/L — HIGH (ref 0.5–2)
LIDOCAIN IGE QN: 22 U/L — SIGNIFICANT CHANGE UP (ref 7–60)
LYMPHOCYTES # BLD AUTO: 25.6 % — SIGNIFICANT CHANGE UP (ref 13–44)
LYMPHOCYTES # BLD AUTO: 3.4 K/UL — HIGH (ref 1–3.3)
MCHC RBC-ENTMCNC: 33.1 PG — SIGNIFICANT CHANGE UP (ref 27–34)
MCHC RBC-ENTMCNC: 33.3 GM/DL — SIGNIFICANT CHANGE UP (ref 32–36)
MCV RBC AUTO: 99.4 FL — SIGNIFICANT CHANGE UP (ref 80–100)
MONOCYTES # BLD AUTO: 0.94 K/UL — HIGH (ref 0–0.9)
MONOCYTES NFR BLD AUTO: 7.1 % — SIGNIFICANT CHANGE UP (ref 2–14)
NEUTROPHILS # BLD AUTO: 8.63 K/UL — HIGH (ref 1.8–7.4)
NEUTROPHILS NFR BLD AUTO: 64.9 % — SIGNIFICANT CHANGE UP (ref 43–77)
NRBC # BLD: 0 /100 WBCS — SIGNIFICANT CHANGE UP (ref 0–0)
PCO2 BLDV: 41 MMHG — SIGNIFICANT CHANGE UP (ref 39–42)
PH BLDV: 7.3 — LOW (ref 7.32–7.43)
PLATELET # BLD AUTO: 158 K/UL — SIGNIFICANT CHANGE UP (ref 150–400)
PO2 BLDV: 31 MMHG — SIGNIFICANT CHANGE UP (ref 25–45)
POTASSIUM BLDV-SCNC: 5.1 MMOL/L — SIGNIFICANT CHANGE UP (ref 3.5–5.1)
POTASSIUM SERPL-MCNC: 5 MMOL/L — SIGNIFICANT CHANGE UP (ref 3.5–5.3)
POTASSIUM SERPL-SCNC: 5 MMOL/L — SIGNIFICANT CHANGE UP (ref 3.5–5.3)
PROT SERPL-MCNC: 6.3 G/DL — SIGNIFICANT CHANGE UP (ref 6–8.3)
PROTHROM AB SERPL-ACNC: 11.6 SEC — SIGNIFICANT CHANGE UP (ref 10.5–13.4)
RBC # BLD: 3.26 M/UL — LOW (ref 3.8–5.2)
RBC # FLD: 12.8 % — SIGNIFICANT CHANGE UP (ref 10.3–14.5)
SAO2 % BLDV: 48 % — LOW (ref 67–88)
SODIUM SERPL-SCNC: 141 MMOL/L — SIGNIFICANT CHANGE UP (ref 135–145)
WBC # BLD: 13.28 K/UL — HIGH (ref 3.8–10.5)
WBC # FLD AUTO: 13.28 K/UL — HIGH (ref 3.8–10.5)

## 2023-04-27 PROCEDURE — 99284 EMERGENCY DEPT VISIT MOD MDM: CPT

## 2023-04-27 PROCEDURE — 71045 X-RAY EXAM CHEST 1 VIEW: CPT | Mod: 26

## 2023-04-27 NOTE — ED PROVIDER NOTE - PATIENT PORTAL LINK FT
You can access the FollowMyHealth Patient Portal offered by [facility] by registering at the following website: [email or web address]. By joining [employer]’s FollowMyHealth portal, you will also be able to view your health information using other applications (apps) compatible with our system.

## 2023-04-27 NOTE — ED PROVIDER NOTE - NSICDXPASTMEDICALHX_GEN_ALL_CORE_FT
PAST MEDICAL HISTORY:  Anxiety     Bipolar 1 disorder resolved per patient    CAD (coronary artery disease)     H/O thrombocytopenia     Heart disease     History of breast cancer     History of herniated intervertebral disc     Hyperlipidemia     Psoriasis     PTSD (post-traumatic stress disorder)     Right carotid artery occlusion     Secondary hypertension

## 2023-04-27 NOTE — ED PROVIDER NOTE - MDM ORDERS SUBMITTED SELECTION
Patient called back and was informed in detail of message below.  Patient verbalized understanding and had no further questions.     Labs/EKG/Imaging Studies

## 2023-04-27 NOTE — ED PROVIDER NOTE - NSFOLLOWUPINSTRUCTIONS_ED_ALL_ED_FT
You were seen in the emergency department for a head laceration. We have evaluated you and determined that you do not require further hospital interventions.    During your stay you had the following relevant results: CT scans that do NOT show evidence of rib fractures.    Please follow up with your primary care provider as necessary to discuss the results of your stay in our department.    If you start to experience worsening symptoms such as severe headaches with or without vomiting, difficulty breathing, chest pain, please return to the emergency department for further evaluation. You were seen in the emergency department for a head laceration. We have evaluated you and determined that you do not require further hospital interventions.    During your stay you had the following relevant results: CT scans that do NOT show evidence of rib fractures.    Please follow up with your primary care provider as necessary to discuss the results of your stay in our department.    If you start to experience worsening symptoms such as severe headaches with or without vomiting, difficulty breathing, chest pain, please return to the emergency department for further evaluation.    Please return to any ER or urgent care in 7 days to have the staples removed.
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
## 2023-04-28 VITALS
HEART RATE: 89 BPM | SYSTOLIC BLOOD PRESSURE: 119 MMHG | RESPIRATION RATE: 16 BRPM | TEMPERATURE: 98 F | OXYGEN SATURATION: 100 % | DIASTOLIC BLOOD PRESSURE: 74 MMHG

## 2023-04-28 LAB
APPEARANCE UR: CLEAR — SIGNIFICANT CHANGE UP
BACTERIA # UR AUTO: NEGATIVE — SIGNIFICANT CHANGE UP
BILIRUB UR-MCNC: NEGATIVE — SIGNIFICANT CHANGE UP
COLOR SPEC: SIGNIFICANT CHANGE UP
DIFF PNL FLD: ABNORMAL
EPI CELLS # UR: 0 /HPF — SIGNIFICANT CHANGE UP
GLUCOSE UR QL: NEGATIVE — SIGNIFICANT CHANGE UP
HYALINE CASTS # UR AUTO: 0 /LPF — SIGNIFICANT CHANGE UP (ref 0–2)
KETONES UR-MCNC: SIGNIFICANT CHANGE UP
LEUKOCYTE ESTERASE UR-ACNC: NEGATIVE — SIGNIFICANT CHANGE UP
NITRITE UR-MCNC: NEGATIVE — SIGNIFICANT CHANGE UP
PH UR: 6 — SIGNIFICANT CHANGE UP (ref 5–8)
PROT UR-MCNC: ABNORMAL
RBC CASTS # UR COMP ASSIST: 1 /HPF — SIGNIFICANT CHANGE UP (ref 0–4)
SP GR SPEC: >1.05 (ref 1.01–1.02)
UROBILINOGEN FLD QL: NEGATIVE — SIGNIFICANT CHANGE UP
WBC UR QL: 0 /HPF — SIGNIFICANT CHANGE UP (ref 0–5)

## 2023-04-28 PROCEDURE — 82330 ASSAY OF CALCIUM: CPT

## 2023-04-28 PROCEDURE — 85025 COMPLETE CBC W/AUTO DIFF WBC: CPT

## 2023-04-28 PROCEDURE — 73080 X-RAY EXAM OF ELBOW: CPT

## 2023-04-28 PROCEDURE — 73590 X-RAY EXAM OF LOWER LEG: CPT

## 2023-04-28 PROCEDURE — 85014 HEMATOCRIT: CPT

## 2023-04-28 PROCEDURE — 84132 ASSAY OF SERUM POTASSIUM: CPT

## 2023-04-28 PROCEDURE — 82803 BLOOD GASES ANY COMBINATION: CPT

## 2023-04-28 PROCEDURE — 73080 X-RAY EXAM OF ELBOW: CPT | Mod: 26,LT

## 2023-04-28 PROCEDURE — 83605 ASSAY OF LACTIC ACID: CPT

## 2023-04-28 PROCEDURE — 71045 X-RAY EXAM CHEST 1 VIEW: CPT

## 2023-04-28 PROCEDURE — 80053 COMPREHEN METABOLIC PANEL: CPT

## 2023-04-28 PROCEDURE — 73562 X-RAY EXAM OF KNEE 3: CPT | Mod: 26,LT,RT

## 2023-04-28 PROCEDURE — 73030 X-RAY EXAM OF SHOULDER: CPT | Mod: 26,RT

## 2023-04-28 PROCEDURE — 85730 THROMBOPLASTIN TIME PARTIAL: CPT

## 2023-04-28 PROCEDURE — 80307 DRUG TEST PRSMV CHEM ANLYZR: CPT

## 2023-04-28 PROCEDURE — 84295 ASSAY OF SERUM SODIUM: CPT

## 2023-04-28 PROCEDURE — 83690 ASSAY OF LIPASE: CPT

## 2023-04-28 PROCEDURE — 73590 X-RAY EXAM OF LOWER LEG: CPT | Mod: 26,LT,RT

## 2023-04-28 PROCEDURE — 82435 ASSAY OF BLOOD CHLORIDE: CPT

## 2023-04-28 PROCEDURE — 73000 X-RAY EXAM OF COLLAR BONE: CPT | Mod: 26,RT

## 2023-04-28 PROCEDURE — 73562 X-RAY EXAM OF KNEE 3: CPT

## 2023-04-28 PROCEDURE — 73030 X-RAY EXAM OF SHOULDER: CPT

## 2023-04-28 PROCEDURE — 73000 X-RAY EXAM OF COLLAR BONE: CPT

## 2023-04-28 PROCEDURE — 99284 EMERGENCY DEPT VISIT MOD MDM: CPT

## 2023-04-28 PROCEDURE — 85018 HEMOGLOBIN: CPT

## 2023-04-28 PROCEDURE — 36415 COLL VENOUS BLD VENIPUNCTURE: CPT

## 2023-04-28 PROCEDURE — 82947 ASSAY GLUCOSE BLOOD QUANT: CPT

## 2023-04-28 PROCEDURE — 85610 PROTHROMBIN TIME: CPT

## 2023-04-28 PROCEDURE — 81001 URINALYSIS AUTO W/SCOPE: CPT

## 2023-04-28 PROCEDURE — 99285 EMERGENCY DEPT VISIT HI MDM: CPT | Mod: 25

## 2023-04-28 RX ORDER — SODIUM CHLORIDE 9 MG/ML
1000 INJECTION INTRAMUSCULAR; INTRAVENOUS; SUBCUTANEOUS ONCE
Refills: 0 | Status: COMPLETED | OUTPATIENT
Start: 2023-04-28 | End: 2023-04-28

## 2023-04-28 RX ORDER — BACITRACIN ZINC 500 UNIT/G
1 OINTMENT IN PACKET (EA) TOPICAL ONCE
Refills: 0 | Status: COMPLETED | OUTPATIENT
Start: 2023-04-28 | End: 2023-04-28

## 2023-04-28 RX ORDER — ACETAMINOPHEN 500 MG
650 TABLET ORAL ONCE
Refills: 0 | Status: COMPLETED | OUTPATIENT
Start: 2023-04-28 | End: 2023-04-28

## 2023-04-28 RX ADMIN — SODIUM CHLORIDE 1000 MILLILITER(S): 9 INJECTION INTRAMUSCULAR; INTRAVENOUS; SUBCUTANEOUS at 03:28

## 2023-04-28 RX ADMIN — Medication 650 MILLIGRAM(S): at 04:29

## 2023-04-28 RX ADMIN — Medication 1 APPLICATION(S): at 04:30
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
## 2023-04-28 NOTE — ED ADULT NURSE REASSESSMENT NOTE - NS ED NURSE REASSESS COMMENT FT1
awaiting social work consult for transport home awaiting social work consult for transport home, breakfast tray provided, +ambulatory to/from bathroom
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
## 2023-04-28 NOTE — CHART NOTE - NSCHARTNOTEFT_GEN_A_CORE
EMERGENCY : SW consulted by ED MD for patient cleared for discharge and in need of assistance with transportation home. As per ED MD patient appropriate for independent taxi discharge and is A&Ox4 and ambulating independently. LMSW reviewed patient's chart. As per chart review patient is a "62-year-old female past medical history bipolar disorder, hypertension, coronary artery disease, dyslipidemia, history of thrombocytopenia, comes to our emergency department as a trauma transfer from [facility]."     LMSW met with patient at bedside and introduced self and role to which patient verbalized understanding. Patient is A&Ox4 at this time, she confirms independence with ambulation and adl's. She states she resides at [address] [place] [address] and is requesting a medicaid taxi back to her home address. She also requests clothes as she states her clothes were soiled due to lacerations. She states she also does not have her keys, that they are inside her apartment, but that the superintendant in the building will be opening her door for her if it is locked. LMSW provided patient with clothes appropriate for weather. Patient with positive SBIRT, assessment declined at this time. Patient declines to identify a caregiver but identifies her friend Navin Grewal PH: [phone] as her emergency contact.     LMSW contacted [facility] and arranged taxi for discharge with [employer] for 12PM pickup. Confirmation # [id number]. LMSW made patient and ED RN and ED MD aware. All parties in agreement with discharge plans. Patient states no further needs for LMSW at present. Ongoing social work availability ensured. Social work continues to remain available for any further needs.
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
## 2023-05-01 NOTE — ED POST DISCHARGE NOTE - ADDITIONAL DOCUMENTATION
received call from pts care coordinator, reviewed ED course and ED discharge instructions - Isiah Yoo PA-C

## 2023-06-05 NOTE — PROGRESS NOTE ADULT - ATTENDING COMMENTS
Patient was seen and examined,interim events noted,labs and radiology studies reviewed.  Eligio Green MD,FACC.  [address] [place] [address].  [facility].  [phone]
No

## 2023-06-15 ENCOUNTER — APPOINTMENT (OUTPATIENT)
Dept: VASCULAR SURGERY | Facility: CLINIC | Age: 63
End: 2023-06-15

## 2023-07-27 ENCOUNTER — APPOINTMENT (OUTPATIENT)
Dept: VASCULAR SURGERY | Facility: CLINIC | Age: 63
End: 2023-07-27
Payer: MEDICAID

## 2023-07-27 VITALS
SYSTOLIC BLOOD PRESSURE: 139 MMHG | HEIGHT: 64 IN | HEART RATE: 80 BPM | DIASTOLIC BLOOD PRESSURE: 89 MMHG | BODY MASS INDEX: 23.9 KG/M2 | WEIGHT: 140 LBS | TEMPERATURE: 97.9 F

## 2023-07-27 VITALS — DIASTOLIC BLOOD PRESSURE: 87 MMHG | HEART RATE: 90 BPM | SYSTOLIC BLOOD PRESSURE: 143 MMHG

## 2023-07-27 PROCEDURE — 93880 EXTRACRANIAL BILAT STUDY: CPT

## 2023-07-27 PROCEDURE — 99213 OFFICE O/P EST LOW 20 MIN: CPT

## 2023-07-27 RX ORDER — ROSUVASTATIN CALCIUM 5 MG/1
TABLET, FILM COATED ORAL
Refills: 0 | Status: ACTIVE | COMMUNITY

## 2023-07-27 RX ORDER — LISINOPRIL 30 MG/1
TABLET ORAL
Refills: 0 | Status: ACTIVE | COMMUNITY

## 2023-07-27 RX ORDER — CLOPIDOGREL BISULFATE 75 MG/1
75 TABLET, FILM COATED ORAL DAILY
Qty: 30 | Refills: 5 | Status: ACTIVE | COMMUNITY
Start: 2022-12-14 | End: 1900-01-01

## 2023-07-27 RX ORDER — ISOSORBIDE MONONITRATE 60 MG
60 TABLET, EXTENDED RELEASE 24 HR ORAL
Refills: 0 | Status: ACTIVE | COMMUNITY

## 2023-08-01 NOTE — PHYSICAL EXAM
[2+] : left 2+ [Alert] : alert [Oriented to Person] : oriented to person [Oriented to Place] : oriented to place [Oriented to Time] : oriented to time [Calm] : calm [de-identified] : well appearing female, NAD [de-identified] : healed R TCAR incision [de-identified] : unlabored [de-identified] : speech appropriate, tongue midline, equal strength x4

## 2023-08-01 NOTE — ASSESSMENT
[FreeTextEntry1] : A/P  Patient is 6 months s/p R TCAR for asymptomatic stenosis. Stent is patent on duplex. There is moderate stenosis of L ICA. Patient complains of slurred speech since head trauma, now improving. Will obtain non-contrast CTH to r/o stroke and CTA carotids to evaluate stenosis. Our office will work on obtaining authorization for CT scans and then schedule patient for the tests.   Patient advised to resume Plavix. Script sent to pharmacy.

## 2023-08-01 NOTE — ADDENDUM
[FreeTextEntry1] : Unable to reach patient via phone call. Have tried all available contact #s on [employer]. Unable to leave VM. Letter mailed to patient advising call back. E-mail also sent advising the call back.
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
## 2023-08-12 ENCOUNTER — OUTPATIENT (OUTPATIENT)
Dept: OUTPATIENT SERVICES | Facility: HOSPITAL | Age: 63
LOS: 1 days | End: 2023-08-12
Payer: MEDICAID

## 2023-08-12 ENCOUNTER — APPOINTMENT (OUTPATIENT)
Dept: CT IMAGING | Facility: IMAGING CENTER | Age: 63
End: 2023-08-12
Payer: MEDICAID

## 2023-08-12 DIAGNOSIS — D21.9 BENIGN NEOPLASM OF CONNECTIVE AND OTHER SOFT TISSUE, UNSPECIFIED: Chronic | ICD-10-CM

## 2023-08-12 DIAGNOSIS — Z98.890 OTHER SPECIFIED POSTPROCEDURAL STATES: Chronic | ICD-10-CM

## 2023-08-12 DIAGNOSIS — K27.5 CHRONIC OR UNSPECIFIED PEPTIC ULCER, SITE UNSPECIFIED, WITH PERFORATION: Chronic | ICD-10-CM

## 2023-08-12 DIAGNOSIS — Z87.42 PERSONAL HISTORY OF OTHER DISEASES OF THE FEMALE GENITAL TRACT: Chronic | ICD-10-CM

## 2023-08-12 DIAGNOSIS — Z95.5 PRESENCE OF CORONARY ANGIOPLASTY IMPLANT AND GRAFT: Chronic | ICD-10-CM

## 2023-08-12 DIAGNOSIS — Z98.62 PERIPHERAL VASCULAR ANGIOPLASTY STATUS: Chronic | ICD-10-CM

## 2023-08-12 DIAGNOSIS — Z90.49 ACQUIRED ABSENCE OF OTHER SPECIFIED PARTS OF DIGESTIVE TRACT: Chronic | ICD-10-CM

## 2023-08-12 DIAGNOSIS — I65.23 OCCLUSION AND STENOSIS OF BILATERAL CAROTID ARTERIES: ICD-10-CM

## 2023-08-12 PROCEDURE — 70450 CT HEAD/BRAIN W/O DYE: CPT

## 2023-08-12 PROCEDURE — 70450 CT HEAD/BRAIN W/O DYE: CPT | Mod: 26

## 2023-08-12 PROCEDURE — 70498 CT ANGIOGRAPHY NECK: CPT

## 2023-08-12 PROCEDURE — 70498 CT ANGIOGRAPHY NECK: CPT | Mod: 26

## 2023-08-23 ENCOUNTER — APPOINTMENT (OUTPATIENT)
Dept: VASCULAR SURGERY | Facility: CLINIC | Age: 63
End: 2023-08-23
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
## 2023-08-23 NOTE — REASON FOR VISIT
[Home] : at home, [unfilled] , at the time of the visit. [Medical Office: ([facility])___] : at the medical office located in  [Verbal consent obtained from patient] : the patient, [unfilled]

## 2023-09-01 ENCOUNTER — APPOINTMENT (OUTPATIENT)
Dept: VASCULAR SURGERY | Facility: CLINIC | Age: 63
End: 2023-09-01
Payer: MEDICAID

## 2023-09-01 PROCEDURE — 99441: CPT

## 2023-09-01 NOTE — HISTORY OF PRESENT ILLNESS
[FreeTextEntry1] : ARABELLA WILLINGHAMLOR : Dec 31 1960 MRN: [id number]     Discussed recent CTA results with the patient. She reports no recurrence of slurred speech (happened for 2 weeks after head trauma and gradually decreased, now no longer happening). Denies any other new focal neurologic deficits. Stopped low dose aspirin per cardio Dr. Lobato due to excessive bruising. On Plavix 75 mg.   Confirmed best contact # [phone]. Confirmed email and address on file.   Results discussed with Dr. Alarcon. Recommend neurology consultation. Will continue with carotid surveillance q6 months.

## 2023-09-01 NOTE — REASON FOR VISIT
[Home] : at home, [unfilled] , at the time of the visit. [Medical Office: ([facility])___] : at the medical office located in  [Verbal consent obtained from patient] : the patient, [unfilled]

## 2023-09-18 NOTE — PROGRESS NOTE BEHAVIORAL HEALTH - OTHER
Detail Level: Detailed Add 03587 Cpt? (Important Note: In 2017 The Use Of 52085 Is Being Tracked By Cms To Determine Future Global Period Reimbursement For Global Periods): no Wound Evaluated By (Optional): Joss Moreira PA-C Wound Diameter In Cm(Optional): 0 Wound Crusting?: clean Wound Discharge?: minimal discharge Wound Edema?: mild Wound Drainage?: serous drainage Wound Color?: pink deferred grandiose delusions including having inherited large sum of money recently; paranoid delusions including that people are trying to kill her

## 2023-10-31 NOTE — DISCHARGE NOTE PROVIDER - HOSPITAL COURSE
This is a 60yo female with PMHx of CAD s/p PCI to LAD and RCA, active tobacco use, HTN, HLD, Bipolar disorder, who presented as a transfer from [facility] for chest pain, NSTEMI. She presented with one week of chest pain, shortness of breath, fatigue- sxs present at rest and exertion. Troponin peaked at 0.016 at OSH, was started on ASA and Plavix. BP at OSH was 190s/80s.  Underwent C 9/26 via RRA- showed patent stents in LAD, minor luminar irregularities in LM, mild prox RCA ISR. Recommend medical management. Plavix was stopped 9/26.    1. Atypical Chest Pain  - CAD with hx of PCI to LAD and RCA, patent stents on [facility] 9/26  - Continue Aspirin 81mg, Atorvastatin 80m qHS  - Change Metoprolol Tartrate 12.5mg BID to Metoprolol Succinate 25mg daily   - Increase Lisinopril to 20mg daily  - Encouraged smoking cessation   - Discussed with patient RRA access site precautions  - Follow up with Cardiology 2-3 weeks from discharge     2. HTN  - Lisinopril as above    3. GERD  - Continue PPI on discharge, f/u PCP     4. Tobacco use  - Encouraged smoking cessation     Case discussed with Dr. Okeefe on 9/27/22. Reviewed discharge medications with patient; All new medications requiring new prescription sent to pharmacy of patients choice. Reviewed need for prescription for previous home medication and new prescriptions sent if requested. Patient in agreement and understands.   This is a 60yo female with PMHx of CAD s/p PCI to LAD and RCA, active tobacco use, HTN, HLD, Bipolar disorder, who presented as a transfer from [facility] for chest pain, NSTEMI. She presented with one week of chest pain, shortness of breath, fatigue- sxs present at rest and exertion. Troponin peaked at 0.016 at OSH, was started on ASA and Plavix. BP at OSH was 190s/80s.  Underwent C 9/26 via [facility]- showed patent stents in LAD, minor luminar irregularities in LM, mild prox RCA ISR. Recommend medical management. Plavix was stopped 9/26.    1. Atypical Chest Pain  - CAD with hx of PCI to LAD and RCA, patent stents on [facility] 9/26  - Continue Aspirin 81mg, Atorvastatin 80m qHS  - Change Metoprolol Tartrate 12.5mg BID to Metoprolol Succinate 25mg daily   - Increase Lisinopril to 20mg daily  - Encouraged smoking cessation   - Discussed with patient RRA access site precautions  - Follow up with Cardiology 2-3 weeks from discharge     2. HTN  - lisinopril & metoprolol as above    3. GERD  - Continue PPI on discharge, f/u PCP     4. Tobacco use  - Encouraged smoking cessation     Case discussed with Dr. Okeefe on 9/27/22. Reviewed discharge medications with patient; All new medications requiring new prescription sent to pharmacy of patients choice. Reviewed need for prescription for previous home medication and new prescriptions sent if requested. Patient in agreement and understands.   Finasteride Pregnancy And Lactation Text: This medication is absolutely contraindicated during pregnancy. It is unknown if it is excreted in breast milk.

## 2023-11-08 NOTE — H&P ADULT - NSHPSOURCEINFORD_GEN_ALL_CORE
chest wall non-tender, breathing is unlabored without accessory muscle use, normal breath sounds
Chart(s)/Patient/Physician/Provider

## 2024-01-05 NOTE — ED PROVIDER NOTE - NSTIMEPROVIDERCAREINITIATE_GEN_ER
Auditory hallucinations discussed via face-to-face conversation with Herminia Urbano PA-C.   07-Sep-2019 09:21

## 2024-02-22 ENCOUNTER — APPOINTMENT (OUTPATIENT)
Dept: VASCULAR SURGERY | Facility: CLINIC | Age: 64
End: 2024-02-22

## 2024-03-19 ENCOUNTER — INPATIENT (INPATIENT)
Facility: HOSPITAL | Age: 64
LOS: 1 days | Discharge: HOME | End: 2024-03-21
Attending: STUDENT IN AN ORGANIZED HEALTH CARE EDUCATION/TRAINING PROGRAM | Admitting: STUDENT IN AN ORGANIZED HEALTH CARE EDUCATION/TRAINING PROGRAM
Payer: MEDICAID

## 2024-03-19 VITALS — WEIGHT: 139.99 LBS | HEIGHT: 67 IN

## 2024-03-19 DIAGNOSIS — Z95.5 PRESENCE OF CORONARY ANGIOPLASTY IMPLANT AND GRAFT: Chronic | ICD-10-CM

## 2024-03-19 DIAGNOSIS — D21.9 BENIGN NEOPLASM OF CONNECTIVE AND OTHER SOFT TISSUE, UNSPECIFIED: Chronic | ICD-10-CM

## 2024-03-19 DIAGNOSIS — Z98.890 OTHER SPECIFIED POSTPROCEDURAL STATES: Chronic | ICD-10-CM

## 2024-03-19 DIAGNOSIS — Z90.49 ACQUIRED ABSENCE OF OTHER SPECIFIED PARTS OF DIGESTIVE TRACT: Chronic | ICD-10-CM

## 2024-03-19 DIAGNOSIS — R07.9 CHEST PAIN, UNSPECIFIED: ICD-10-CM

## 2024-03-19 DIAGNOSIS — Z98.62 PERIPHERAL VASCULAR ANGIOPLASTY STATUS: Chronic | ICD-10-CM

## 2024-03-19 DIAGNOSIS — Z87.42 PERSONAL HISTORY OF OTHER DISEASES OF THE FEMALE GENITAL TRACT: Chronic | ICD-10-CM

## 2024-03-19 DIAGNOSIS — K27.5 CHRONIC OR UNSPECIFIED PEPTIC ULCER, SITE UNSPECIFIED, WITH PERFORATION: Chronic | ICD-10-CM

## 2024-03-19 PROCEDURE — 90792 PSYCH DIAG EVAL W/MED SRVCS: CPT

## 2024-03-19 PROCEDURE — 99152 MOD SED SAME PHYS/QHP 5/>YRS: CPT

## 2024-03-19 PROCEDURE — 93010 ELECTROCARDIOGRAM REPORT: CPT

## 2024-03-19 PROCEDURE — 93458 L HRT ARTERY/VENTRICLE ANGIO: CPT | Mod: 26

## 2024-03-19 PROCEDURE — 99222 1ST HOSP IP/OBS MODERATE 55: CPT | Mod: 25

## 2024-03-19 RX ORDER — SODIUM CHLORIDE 9 MG/ML
250 INJECTION INTRAMUSCULAR; INTRAVENOUS; SUBCUTANEOUS
Refills: 0 | Status: DISCONTINUED | OUTPATIENT
Start: 2024-03-19 | End: 2024-03-21

## 2024-03-19 RX ORDER — INFLUENZA VIRUS VACCINE 15; 15; 15; 15 UG/.5ML; UG/.5ML; UG/.5ML; UG/.5ML
0.5 SUSPENSION INTRAMUSCULAR ONCE
Refills: 0 | Status: DISCONTINUED | OUTPATIENT
Start: 2024-03-19 | End: 2024-03-21

## 2024-03-19 RX ORDER — CLOPIDOGREL BISULFATE 75 MG/1
75 TABLET, FILM COATED ORAL DAILY
Refills: 0 | Status: DISCONTINUED | OUTPATIENT
Start: 2024-03-19 | End: 2024-03-21

## 2024-03-19 RX ORDER — CLOPIDOGREL BISULFATE 75 MG/1
1 TABLET, FILM COATED ORAL
Qty: 0 | Refills: 0 | DISCHARGE

## 2024-03-19 RX ORDER — LISINOPRIL 2.5 MG/1
1 TABLET ORAL
Qty: 0 | Refills: 0 | DISCHARGE

## 2024-03-19 RX ORDER — LORATADINE 10 MG/1
10 TABLET ORAL DAILY
Refills: 0 | Status: DISCONTINUED | OUTPATIENT
Start: 2024-03-19 | End: 2024-03-21

## 2024-03-19 RX ORDER — ATORVASTATIN CALCIUM 80 MG/1
80 TABLET, FILM COATED ORAL AT BEDTIME
Refills: 0 | Status: DISCONTINUED | OUTPATIENT
Start: 2024-03-19 | End: 2024-03-21

## 2024-03-19 RX ORDER — NICOTINE POLACRILEX 2 MG
1 GUM BUCCAL DAILY
Refills: 0 | Status: DISCONTINUED | OUTPATIENT
Start: 2024-03-19 | End: 2024-03-21

## 2024-03-19 RX ORDER — SODIUM CHLORIDE 9 MG/ML
3 INJECTION INTRAMUSCULAR; INTRAVENOUS; SUBCUTANEOUS EVERY 8 HOURS
Refills: 0 | Status: DISCONTINUED | OUTPATIENT
Start: 2024-03-19 | End: 2024-03-21

## 2024-03-19 RX ORDER — LISINOPRIL 2.5 MG/1
20 TABLET ORAL DAILY
Refills: 0 | Status: DISCONTINUED | OUTPATIENT
Start: 2024-03-19 | End: 2024-03-21

## 2024-03-19 RX ORDER — ISOSORBIDE MONONITRATE 60 MG/1
60 TABLET, EXTENDED RELEASE ORAL DAILY
Refills: 0 | Status: DISCONTINUED | OUTPATIENT
Start: 2024-03-19 | End: 2024-03-21

## 2024-03-19 RX ORDER — PANTOPRAZOLE SODIUM 20 MG/1
1 TABLET, DELAYED RELEASE ORAL
Qty: 0 | Refills: 0 | DISCHARGE

## 2024-03-19 RX ORDER — NICOTINE POLACRILEX 2 MG
1 GUM BUCCAL
Qty: 0 | Refills: 0 | DISCHARGE

## 2024-03-19 RX ORDER — ROSUVASTATIN CALCIUM 5 MG/1
1 TABLET ORAL
Qty: 0 | Refills: 0 | DISCHARGE

## 2024-03-19 RX ORDER — ARIPIPRAZOLE 15 MG/1
10 TABLET ORAL DAILY
Refills: 0 | Status: DISCONTINUED | OUTPATIENT
Start: 2024-03-19 | End: 2024-03-21

## 2024-03-19 RX ORDER — ASPIRIN/CALCIUM CARB/MAGNESIUM 324 MG
81 TABLET ORAL DAILY
Refills: 0 | Status: DISCONTINUED | OUTPATIENT
Start: 2024-03-19 | End: 2024-03-21

## 2024-03-19 RX ADMIN — SODIUM CHLORIDE 100 MILLILITER(S): 9 INJECTION INTRAMUSCULAR; INTRAVENOUS; SUBCUTANEOUS at 17:30

## 2024-03-19 RX ADMIN — Medication 0.5 MILLIGRAM(S): at 21:37

## 2024-03-19 RX ADMIN — SODIUM CHLORIDE 3 MILLILITER(S): 9 INJECTION INTRAMUSCULAR; INTRAVENOUS; SUBCUTANEOUS at 21:18

## 2024-03-19 RX ADMIN — SODIUM CHLORIDE 3 MILLILITER(S): 9 INJECTION INTRAMUSCULAR; INTRAVENOUS; SUBCUTANEOUS at 20:26

## 2024-03-19 RX ADMIN — ATORVASTATIN CALCIUM 80 MILLIGRAM(S): 80 TABLET, FILM COATED ORAL at 21:08

## 2024-03-19 NOTE — H&P CARDIOLOGY - NSICDXPASTMEDICALHX_GEN_ALL_CORE_FT
PAST MEDICAL HISTORY:  Anxiety     Bipolar 1 disorder resolved per patient    CAD (coronary artery disease)     Carotid stenosis     HLD (hyperlipidemia)     HTN (hypertension)     Psoriasis     PTSD (post-traumatic stress disorder)     PUD (peptic ulcer disease)     Tobacco abuse

## 2024-03-19 NOTE — BH CONSULTATION LIAISON ASSESSMENT NOTE - RISK ASSESSMENT
Risk factors; h/o bipolar disorder, acute medical issues  Protective factors; NO si or hi, future oriented, treatment seeking, hopeful, no assess to weapons

## 2024-03-19 NOTE — BH CONSULTATION LIAISON ASSESSMENT NOTE - SUMMARY
Patient is 64 y/o woman with as per chart,  PMHx of CAD s/p stent placement, carotid stenosis s/p right carotid endarterectomy, HTN, HLD, PUD with history of duodenal ulcer perforation, breast cancer s/p right lumpectomy, as per chart: untreated bipolar disorder, and tobacco abuse , nuclear stress test was abnormal and revealed ischemia of LAD and RCA. Pt is now transferred to [facility] from [facility]  for cardiac catheterization.    Patient with mildly elated mood and some irritability, thought process overall linear but can be tangential at times. Patient firmly denies passive or active SIIP, denies HIIP, no avh, denies feeling unsafe in the hospital, reports she has h/o bipolar but she does not agree with it. Will need more assessment/collateral to establish a diagnosis.     In terms of capacity to consent for cardiac catheterization, pt. states that she had cardiac catheterization in the past with stent placed, she is agreeable to go for the procedure, pt. is able to verbalize  understanding of the indication/risk/benefit of accepting or refusing the proposed procedure, able to verbalize understanding/appreciate her current medical issues,  and able to manipulate information in  a logical manner when provided to her. I agree with the primary team, that patient at this time has capacity to consent for cardiac catheterization    PLAN:  -Continue psychiatric medication as written  -May use Ativan 0.5-1 mg po/im/iv q6h prn for anxiety/agitation  -Hold antipsychotics if qtc >500    Case d/w Richard CALLE Patient is 64 y/o woman with as per chart,  PMHx of CAD s/p stent placement, carotid stenosis s/p right carotid endarterectomy, HTN, HLD, PUD with history of duodenal ulcer perforation, breast cancer s/p right lumpectomy,  untreated bipolar disorder, and tobacco abuse , nuclear stress test was abnormal and revealed ischemia of LAD and RCA. Pt is now transferred to [facility] from [facility]  for cardiac catheterization.    Patient with mildly elated mood and some irritability, thought process overall linear but can be tangential at times. Patient firmly denies passive or active SIIP, denies HIIP, no avh, denies feeling unsafe in the hospital, reports she has h/o bipolar but she does not agree with it. Will need more assessment/collateral to establish a diagnosis.     In terms of capacity to consent for cardiac catheterization, pt. states that she had cardiac catheterization in the past with stent placed, she is agreeable to go for the procedure, pt. is able to verbalize  understanding of the indication/risk/benefit of accepting or refusing the proposed procedure, able to verbalize understanding/appreciate her current medical issues,  and able to manipulate information in  a logical manner when provided to her. I agree with the primary team, that patient at this time has capacity to consent for cardiac catheterization    PLAN:  -Continue psychiatric medication as written  -May use Ativan 0.5-1 mg po/im/iv q6h prn for anxiety/agitation  -Hold antipsychotics if qtc >500    Case d/w Richard CALLE Patient is 62 y/o woman with as per chart,  PMHx of CAD s/p stent placement, carotid stenosis s/p right carotid endarterectomy, HTN, HLD, PUD with history of duodenal ulcer perforation, breast cancer s/p right lumpectomy,  untreated bipolar disorder, and tobacco abuse , Pt is transferred to [facility] from [facility]  for cardiac catheterization.    Patient with mildly elated mood and some irritability, thought process overall linear but can be tangential at times. Patient firmly denies passive or active SIIP, denies HIIP, no avh, denies feeling unsafe in the hospital, reports she has h/o bipolar but she does not agree with it. Will need more assessment/collateral to establish a diagnosis.     In terms of capacity to consent for cardiac catheterization, pt. states that she had cardiac catheterization in the past with stent placed, she is agreeable to go for the procedure, pt. is able to verbalize  understanding of the indication/risk/benefit of accepting or refusing the proposed procedure, able to verbalize understanding/appreciate her current medical issues,  and able to manipulate information in  a logical manner when provided to her. I agree with the primary team, that patient at this time has capacity to consent for cardiac catheterization    PLAN:  -Continue psychiatric medication as written  -May use Ativan 0.5-1 mg po/im/iv q6h prn for anxiety/agitation  -Hold antipsychotics if qtc >500    Case d/w Richard CALLE

## 2024-03-19 NOTE — BH CONSULTATION LIAISON ASSESSMENT NOTE - CURRENT MEDICATION
MEDICATIONS  (STANDING):  ARIPiprazole 10 milliGRAM(s) Oral daily  aspirin enteric coated 81 milliGRAM(s) Oral daily  atorvastatin 80 milliGRAM(s) Oral at bedtime  clopidogrel Tablet 75 milliGRAM(s) Oral daily  isosorbide   mononitrate ER Tablet (IMDUR) 60 milliGRAM(s) Oral daily  lisinopril 20 milliGRAM(s) Oral daily  loratadine 10 milliGRAM(s) Oral daily  nicotine -   7 mG/24Hr(s) Patch 1 Patch Transdermal daily  sodium chloride 0.9% lock flush 3 milliLiter(s) IV Push every 8 hours    MEDICATIONS  (PRN):

## 2024-03-19 NOTE — PATIENT PROFILE ADULT - FUNCTIONAL ASSESSMENT - BASIC MOBILITY 3.
4 = No assist / stand by assistance no chest pain/no orthopnea/no dyspnea on exertion/no palpitations no dyspnea on exertion/no palpitations/no chest pain

## 2024-03-19 NOTE — H&P CARDIOLOGY - NSICDXPASTSURGICALHX_GEN_ALL_CORE_FT
PAST SURGICAL HISTORY:  History of appendectomy     History of bilateral breast reduction surgery     History of lumpectomy     History of ovarian cyst benign    S/P coronary artery stent placement     S/P myomectomy

## 2024-03-19 NOTE — BH CONSULTATION LIAISON ASSESSMENT NOTE - NSBHREFERDETAILS_PSY_A_CORE_FT
Capacity to consent for cardiac catheterization, as per discussion with team, pt. is AAOX3, h/o bipolar disorder, can be tangential at times, but able to engage and team thinks patient has capacity to consent for the procedure.     Team also reports that patient was seen by psych for her untreated bipolar disorder at [facility].

## 2024-03-19 NOTE — BH CONSULTATION LIAISON ASSESSMENT NOTE - OTHER
not sure if patient  has delusions or what she has been telling is reality based overall linear, at times can be tangential but redirectable .. somewhat elated and irritable at times good

## 2024-03-19 NOTE — PATIENT PROFILE ADULT - FALL HARM RISK - HARM RISK INTERVENTIONS

## 2024-03-19 NOTE — BH CONSULTATION LIAISON ASSESSMENT NOTE - HPI (INCLUDE ILLNESS QUALITY, SEVERITY, DURATION, TIMING, CONTEXT, MODIFYING FACTORS, ASSOCIATED SIGNS AND SYMPTOMS)
Patient is 64 y/o woman with as per chart,  PMHx of CAD s/p stent placement, carotid stenosis s/p right carotid endarterectomy, HTN, HLD, PUD with history of duodenal ulcer perforation, breast cancer s/p right lumpectomy, as per chart: untreated bipolar disorder, and tobacco abuse , nuclear stress test was abnormal and revealed ischemia of LAD and RCA. Pt is now transferred to [facility] from [facility]  for cardiac catheterization.    Chart reviewed, pt. seen/evaluated, oriented/cooperative, was little irritable seeing a psychiatrist but engaged well. Reports she was hospitalized in 2009 after the death of her partner and was depressed/grieving, states she was diagnosed her  Bipolar disorder at that time and she received multiple psychiatric medications.  Patient does not think she has bipolar disorder. Patient vaguely reports that her partner left her some money and she think some people are after her due to that. Patient was guarded and did not want to provide any more details, stating " I don't like to talk to about it"  . Patient firmly denies passive or active SIIP, denies HIIP, no avh, denies feeling unsafe in the hospital, stating "I am feeling good here". When discussing the support system, pt. reports she has nephews/nieces and a boy friend.     In terms of capacity to consent for cardiac catheterization, pt. states that she had cardiac catheterization in the past with stent placed, she is agreeable to go for the procedure, pt. is able to verbalize  understanding of the indication/risk/benefit of accepting or refusing the proposed procedure, able to verbalize understanding/appreciate her current medical issues,  and able to manipulate information in  a logical manner when provided to her. I agree with the primary team, that patient at this time has capacity to consent for cardiac catheterization Patient is 62 y/o woman with as per chart,  PMHx of CAD s/p stent placement, carotid stenosis s/p right carotid endarterectomy, HTN, HLD, PUD with history of duodenal ulcer perforation, breast cancer s/p right lumpectomy,  untreated bipolar disorder, and tobacco abuse , nuclear stress test was abnormal and revealed ischemia of LAD and RCA. Pt is now transferred to [facility] from [facility]  for cardiac catheterization.    Chart reviewed, pt. seen/evaluated, oriented/cooperative, was little irritable seeing a psychiatrist but engaged well. Reports she was hospitalized in 2009 after the death of her partner and was depressed/grieving, states she was diagnosed her  Bipolar disorder at that time and she received multiple psychiatric medications.  Patient does not think she has bipolar disorder. Patient vaguely reports that her partner left her some money and she think some people are after her due to that. Patient was guarded and did not want to provide any more details, stating " I don't like to talk to about it"  . Patient firmly denies passive or active SIIP, denies HIIP, no avh, denies feeling unsafe in the hospital, stating "I am feeling good here". When discussing the support system, pt. reports she has nephews/nieces and a boy friend.     In terms of capacity to consent for cardiac catheterization, pt. states that she had cardiac catheterization in the past with stent placed, she is agreeable to go for the procedure, pt. is able to verbalize  understanding of the indication/risk/benefit of accepting or refusing the proposed procedure, able to verbalize understanding/appreciate her current medical issues,  and able to manipulate information in  a logical manner when provided to her. I agree with the primary team, that patient at this time has capacity to consent for cardiac catheterization Patient is 62 y/o woman with as per chart,  PMHx of CAD s/p stent placement, carotid stenosis s/p right carotid endarterectomy, HTN, HLD, PUD with history of duodenal ulcer perforation, breast cancer s/p right lumpectomy,  untreated bipolar disorder, and tobacco abuse , Pt is transferred to [facility] from [facility]  for cardiac catheterization.    Chart reviewed, pt. seen/evaluated, oriented/cooperative, was little irritable seeing a psychiatrist but engaged well. Reports she was hospitalized in 2009 after the death of her partner and was depressed/grieving, states she was diagnosed with  Bipolar disorder at that time and she received multiple psychiatric medications.  Patient does not think she has bipolar disorder. Patient vaguely reports that her partner left her some money and she think some people are after her due to that. Patient was guarded and did not want to provide any more details, stating " I don't like to talk to about it"  . Patient firmly denies passive or active SIIP, denies HIIP, no avh, denies feeling unsafe in the hospital, stating "I am feeling good here". When discussing the support system, pt. reports she has nephews/nieces and a boy friend.     In terms of capacity to consent for cardiac catheterization, pt. states that she had cardiac catheterization in the past with stent placed, she is agreeable to go for the procedure, pt. is able to verbalize  understanding of the indication/risk/benefit of accepting or refusing the proposed procedure, able to verbalize understanding/appreciate her current medical issues,  and able to manipulate information in  a logical manner when provided to her. I agree with the primary team, that patient at this time has capacity to consent for cardiac catheterization

## 2024-03-19 NOTE — CHART NOTE - NSCHARTNOTEFT_GEN_A_CORE
3/19 Night Coverage [facility] ACP    ACP MEDICINE NIGHT COVERAGE    Patient seen at bedside status post cath via R radial artery. Site clean, dry and intact. No bleeding, underlying hematoma, or erythema. No bruit auscultated. Patient denies chest pain, SOB, numbness/weakness/tingling. Pulses present, capillary refill appropriate. Patient educated and understands if any of the above symptoms were to arise, to notify RN. Will continue to monitor closely.    T(C): 36.4 (03-19-24 @ 20:53), Max: 36.4 (03-19-24 @ 20:53)  HR: 71 (03-19-24 @ 21:20) (71 - 73)  BP: 145/89 (03-19-24 @ 21:20) (145/89 - 158/81)  RR: 18 (03-19-24 @ 20:53) (18 - 18)  SpO2: 100% (03-19-24 @ 20:53) (100% - 100%)    CARYL Hernandez-CANDY  Medicine [facility]

## 2024-03-19 NOTE — H&P CARDIOLOGY - HISTORY OF PRESENT ILLNESS
62 y/o female with a PMHx of CAD s/p stent placement, carotid stenosis s/p right carotid endarterectomy, HTN, HLD, PUD with history of duodenal ulcer perforation, breast cancer s/p right lumpectomy, untreated bipolar disorder, and tobacco abuse presents as a transfer from [facility]. Pt has been experiencing shortness of breath for the last two weeks, which has been relatively constant and which she attributes to her neighbor wearing "dollar store" perfume. Pt says she takes Claritin since is aggravates her breathing and allergies. Pt also admits to chronic but worsening generalized abdominal pain that radiates to her chest. Pt says the chest pain does not radiate and is not exertional. She thinks it is related to her abdominal pain and "gas," as she is gluten intolerant. Pt says anything with gluten or grain causes her stomach to be upset. Pt was admitted to [facility]. CT angio chest was negative for pulmonary embolism. CT abdomen/pelvis was negative. Carotid doppler shows no significant stenosis bilaterally. Echocardiogram shows normal LV function (EF 60-65%) with no valvular abnormalities. Nuclear stress test was abnormal and revealed ischemia of LAD and RCA. Pt is now transferred to [facility] for cardiac catheterization.    During hospitalization at [facility], pt was seen by psych for her untreated bipolar disorder. At that time, she suggested she would be inheriting money fro her "gazillionaire" boyfriend and that multiple gangs in the area were after her and were going to kill her. Documentation in chart.     Cardiologist: Dr. Arnaud Lobato

## 2024-03-20 ENCOUNTER — TRANSCRIPTION ENCOUNTER (OUTPATIENT)
Age: 64
End: 2024-03-20

## 2024-03-20 DIAGNOSIS — I25.10 ATHEROSCLEROTIC HEART DISEASE OF NATIVE CORONARY ARTERY WITHOUT ANGINA PECTORIS: ICD-10-CM

## 2024-03-20 DIAGNOSIS — F31.9 BIPOLAR DISORDER, UNSPECIFIED: ICD-10-CM

## 2024-03-20 DIAGNOSIS — R07.9 CHEST PAIN, UNSPECIFIED: ICD-10-CM

## 2024-03-20 DIAGNOSIS — Z29.9 ENCOUNTER FOR PROPHYLACTIC MEASURES, UNSPECIFIED: ICD-10-CM

## 2024-03-20 DIAGNOSIS — E78.5 HYPERLIPIDEMIA, UNSPECIFIED: ICD-10-CM

## 2024-03-20 DIAGNOSIS — K27.9 PEPTIC ULCER, SITE UNSPECIFIED, UNSPECIFIED AS ACUTE OR CHRONIC, WITHOUT HEMORRHAGE OR PERFORATION: ICD-10-CM

## 2024-03-20 DIAGNOSIS — Z72.0 TOBACCO USE: ICD-10-CM

## 2024-03-20 DIAGNOSIS — I10 ESSENTIAL (PRIMARY) HYPERTENSION: ICD-10-CM

## 2024-03-20 LAB
ANION GAP SERPL CALC-SCNC: 10 MMOL/L — SIGNIFICANT CHANGE UP (ref 7–14)
BUN SERPL-MCNC: 15 MG/DL — SIGNIFICANT CHANGE UP (ref 7–23)
CALCIUM SERPL-MCNC: 9.1 MG/DL — SIGNIFICANT CHANGE UP (ref 8.4–10.5)
CHLORIDE SERPL-SCNC: 107 MMOL/L — SIGNIFICANT CHANGE UP (ref 98–107)
CO2 SERPL-SCNC: 23 MMOL/L — SIGNIFICANT CHANGE UP (ref 22–31)
CREAT SERPL-MCNC: 0.7 MG/DL — SIGNIFICANT CHANGE UP (ref 0.5–1.3)
EGFR: 98 ML/MIN/1.73M2 — SIGNIFICANT CHANGE UP
GLUCOSE SERPL-MCNC: 90 MG/DL — SIGNIFICANT CHANGE UP (ref 70–99)
HCT VFR BLD CALC: 39.8 % — SIGNIFICANT CHANGE UP (ref 34.5–45)
HGB BLD-MCNC: 13.8 G/DL — SIGNIFICANT CHANGE UP (ref 11.5–15.5)
MAGNESIUM SERPL-MCNC: 2.1 MG/DL — SIGNIFICANT CHANGE UP (ref 1.6–2.6)
MCHC RBC-ENTMCNC: 34 PG — SIGNIFICANT CHANGE UP (ref 27–34)
MCHC RBC-ENTMCNC: 34.7 GM/DL — SIGNIFICANT CHANGE UP (ref 32–36)
MCV RBC AUTO: 98 FL — SIGNIFICANT CHANGE UP (ref 80–100)
NRBC # BLD: 0 /100 WBCS — SIGNIFICANT CHANGE UP (ref 0–0)
NRBC # FLD: 0 K/UL — SIGNIFICANT CHANGE UP (ref 0–0)
PHOSPHATE SERPL-MCNC: 4.9 MG/DL — HIGH (ref 2.5–4.5)
PLATELET # BLD AUTO: 142 K/UL — LOW (ref 150–400)
POTASSIUM SERPL-MCNC: 4 MMOL/L — SIGNIFICANT CHANGE UP (ref 3.5–5.3)
POTASSIUM SERPL-SCNC: 4 MMOL/L — SIGNIFICANT CHANGE UP (ref 3.5–5.3)
RBC # BLD: 4.06 M/UL — SIGNIFICANT CHANGE UP (ref 3.8–5.2)
RBC # FLD: 13.2 % — SIGNIFICANT CHANGE UP (ref 10.3–14.5)
SODIUM SERPL-SCNC: 140 MMOL/L — SIGNIFICANT CHANGE UP (ref 135–145)
WBC # BLD: 7.73 K/UL — SIGNIFICANT CHANGE UP (ref 3.8–10.5)
WBC # FLD AUTO: 7.73 K/UL — SIGNIFICANT CHANGE UP (ref 3.8–10.5)

## 2024-03-20 PROCEDURE — 99232 SBSQ HOSP IP/OBS MODERATE 35: CPT

## 2024-03-20 PROCEDURE — 99233 SBSQ HOSP IP/OBS HIGH 50: CPT

## 2024-03-20 RX ORDER — PANTOPRAZOLE SODIUM 20 MG/1
40 TABLET, DELAYED RELEASE ORAL
Refills: 0 | Status: DISCONTINUED | OUTPATIENT
Start: 2024-03-20 | End: 2024-03-21

## 2024-03-20 RX ORDER — LANOLIN ALCOHOL/MO/W.PET/CERES
3 CREAM (GRAM) TOPICAL AT BEDTIME
Refills: 0 | Status: DISCONTINUED | OUTPATIENT
Start: 2024-03-20 | End: 2024-03-21

## 2024-03-20 RX ORDER — METOPROLOL TARTRATE 50 MG
25 TABLET ORAL DAILY
Refills: 0 | Status: DISCONTINUED | OUTPATIENT
Start: 2024-03-20 | End: 2024-03-21

## 2024-03-20 RX ADMIN — SODIUM CHLORIDE 3 MILLILITER(S): 9 INJECTION INTRAMUSCULAR; INTRAVENOUS; SUBCUTANEOUS at 14:02

## 2024-03-20 RX ADMIN — Medication 1 PATCH: at 20:59

## 2024-03-20 RX ADMIN — CLOPIDOGREL BISULFATE 75 MILLIGRAM(S): 75 TABLET, FILM COATED ORAL at 11:44

## 2024-03-20 RX ADMIN — LISINOPRIL 20 MILLIGRAM(S): 2.5 TABLET ORAL at 05:48

## 2024-03-20 RX ADMIN — Medication 3 MILLIGRAM(S): at 22:28

## 2024-03-20 RX ADMIN — ARIPIPRAZOLE 10 MILLIGRAM(S): 15 TABLET ORAL at 11:44

## 2024-03-20 RX ADMIN — LORATADINE 10 MILLIGRAM(S): 10 TABLET ORAL at 11:44

## 2024-03-20 RX ADMIN — SODIUM CHLORIDE 3 MILLILITER(S): 9 INJECTION INTRAMUSCULAR; INTRAVENOUS; SUBCUTANEOUS at 06:16

## 2024-03-20 RX ADMIN — ISOSORBIDE MONONITRATE 60 MILLIGRAM(S): 60 TABLET, EXTENDED RELEASE ORAL at 11:46

## 2024-03-20 RX ADMIN — Medication 25 MILLIGRAM(S): at 11:46

## 2024-03-20 RX ADMIN — ATORVASTATIN CALCIUM 80 MILLIGRAM(S): 80 TABLET, FILM COATED ORAL at 22:28

## 2024-03-20 RX ADMIN — Medication 81 MILLIGRAM(S): at 11:45

## 2024-03-20 RX ADMIN — Medication 1 PATCH: at 11:43

## 2024-03-20 NOTE — DISCHARGE NOTE PROVIDER - NSDCFUSCHEDAPPT_GEN_ALL_CORE_FT
[facility]  VASCULAR [address]  Scheduled Appointment: 03/28/2024    [facility]  VASCULAR [address]  Scheduled Appointment: 03/28/2024     [facility]  VASCULAR [address]  Scheduled Appointment: 04/11/2024    [facility]  VASCULAR [address]  Scheduled Appointment: 04/11/2024

## 2024-03-20 NOTE — DISCHARGE NOTE PROVIDER - CARE PROVIDER_API CALL
your primary care provider,   your primary care provider in 1-2 weeks  Phone: (   )    -  Fax: (   )    -  Follow Up Time:

## 2024-03-20 NOTE — PROGRESS NOTE ADULT - TIME BILLING
Review of laboratory data, radiology results, consultants' recommendations, documentation in [place], discussion with patient/house staff/ACP and interdisciplinary staff (such as , social workers, etc). Interventions were performed as documented above.

## 2024-03-20 NOTE — PROGRESS NOTE ADULT - SUBJECTIVE AND OBJECTIVE BOX
[facility]  Division of Hospital Medicine  Albania Diaz MD  Pager: [phone]      Patient is a 63y old  Female who presents with a chief complaint of     SUBJECTIVE / OVERNIGHT EVENTS:  ADDITIONAL REVIEW OF SYSTEMS:    MEDICATIONS  (STANDING):  ARIPiprazole 10 milliGRAM(s) Oral daily  aspirin enteric coated 81 milliGRAM(s) Oral daily  atorvastatin 80 milliGRAM(s) Oral at bedtime  clopidogrel Tablet 75 milliGRAM(s) Oral daily  influenza   Vaccine 0.5 milliLiter(s) IntraMuscular once  isosorbide   mononitrate ER Tablet (IMDUR) 60 milliGRAM(s) Oral daily  lisinopril 20 milliGRAM(s) Oral daily  loratadine 10 milliGRAM(s) Oral daily  nicotine -   7 mG/24Hr(s) Patch 1 Patch Transdermal daily  sodium chloride 0.9% lock flush 3 milliLiter(s) IV Push every 8 hours  sodium chloride 0.9%. 250 milliLiter(s) (100 mL/Hr) IV Continuous <Continuous>    MEDICATIONS  (PRN):  LORazepam     Tablet 0.5 milliGRAM(s) Oral every 6 hours PRN Agitation      CAPILLARY BLOOD GLUCOSE        I&O's Summary      PHYSICAL EXAM:  Vital Signs Last 24 Hrs  T(C): 36.7 (20 Mar 2024 05:20), Max: 36.7 (20 Mar 2024 05:20)  T(F): 98.1 (20 Mar 2024 05:20), Max: 98.1 (20 Mar 2024 05:20)  HR: 60 (20 Mar 2024 05:20) (60 - 73)  BP: 146/45 (20 Mar 2024 05:20) (145/89 - 158/81)  BP(mean): --  RR: 18 (20 Mar 2024 05:20) (18 - 18)  SpO2: 100% (20 Mar 2024 05:20) (100% - 100%)    Parameters below as of 20 Mar 2024 05:20  Patient On (Oxygen Delivery Method): room air      CONSTITUTIONAL: NAD, well-developed, well-groomed  EYES: PERRLA; conjunctiva and sclera clear  ENMT: Moist oral mucosa, no pharyngeal injection or exudates; normal dentition  NECK: Supple, no palpable masses; no thyromegaly  RESPIRATORY: Normal respiratory effort; lungs are clear to auscultation bilaterally  CARDIOVASCULAR: Regular rate and rhythm, normal S1 and S2, no murmur/rub/gallop; No lower extremity edema; Peripheral pulses are 2+ bilaterally  ABDOMEN: Nontender to palpation, normoactive bowel sounds, no rebound/guarding; No hepatosplenomegaly  MUSCULOSKELETAL:  Normal gait; no clubbing or cyanosis of digits; no joint swelling or tenderness to palpation  PSYCH: A+O to person, place, and time; affect appropriate  NEUROLOGY: CN 2-12 are intact and symmetric; no gross sensory deficits   SKIN: No rashes; no palpable lesions    LABS:                        13.8   7.73  )-----------( 142      ( 20 Mar 2024 05:54 )             39.8     03-20    140  |  107  |  15  ----------------------------<  90  4.0   |  23  |  0.70    Ca    9.1      20 Mar 2024 05:54  Phos  4.9     03-20  Mg     2.10     03-20            Urinalysis Basic - ( 20 Mar 2024 05:54 )    Color: x / Appearance: x / SG: x / pH: x  Gluc: 90 mg/dL / Ketone: x  / Bili: x / Urobili: x   Blood: x / Protein: x / Nitrite: x   Leuk Esterase: x / RBC: x / WBC x   Sq Epi: x / Non Sq Epi: x / Bacteria: x          RADIOLOGY & ADDITIONAL TESTS:  Results Reviewed:   Imaging Personally Reviewed:  Electrocardiogram Personally Reviewed:    COORDINATION OF CARE:  Care Discussed with Consultants/Other Providers [Y/N]:  Prior or Outpatient Records Reviewed [Y/N]:   [facility]  Division of Hospital Medicine  Albania Diaz MD  Pager: [phone]      Patient is a 63y old  Female who presents with a chief complaint of     SUBJECTIVE / OVERNIGHT EVENTS: Patient seen and examined at bedside   ADDITIONAL REVIEW OF SYSTEMS:    MEDICATIONS  (STANDING):  ARIPiprazole 10 milliGRAM(s) Oral daily  aspirin enteric coated 81 milliGRAM(s) Oral daily  atorvastatin 80 milliGRAM(s) Oral at bedtime  clopidogrel Tablet 75 milliGRAM(s) Oral daily  influenza   Vaccine 0.5 milliLiter(s) IntraMuscular once  isosorbide   mononitrate ER Tablet (IMDUR) 60 milliGRAM(s) Oral daily  lisinopril 20 milliGRAM(s) Oral daily  loratadine 10 milliGRAM(s) Oral daily  nicotine -   7 mG/24Hr(s) Patch 1 Patch Transdermal daily  sodium chloride 0.9% lock flush 3 milliLiter(s) IV Push every 8 hours  sodium chloride 0.9%. 250 milliLiter(s) (100 mL/Hr) IV Continuous <Continuous>    MEDICATIONS  (PRN):  LORazepam     Tablet 0.5 milliGRAM(s) Oral every 6 hours PRN Agitation      CAPILLARY BLOOD GLUCOSE        I&O's Summary      PHYSICAL EXAM:  Vital Signs Last 24 Hrs  T(C): 36.7 (20 Mar 2024 05:20), Max: 36.7 (20 Mar 2024 05:20)  T(F): 98.1 (20 Mar 2024 05:20), Max: 98.1 (20 Mar 2024 05:20)  HR: 60 (20 Mar 2024 05:20) (60 - 73)  BP: 146/45 (20 Mar 2024 05:20) (145/89 - 158/81)  BP(mean): --  RR: 18 (20 Mar 2024 05:20) (18 - 18)  SpO2: 100% (20 Mar 2024 05:20) (100% - 100%)    Parameters below as of 20 Mar 2024 05:20  Patient On (Oxygen Delivery Method): room air      CONSTITUTIONAL: NAD, well-developed, well-groomed  EYES: PERRLA; conjunctiva and sclera clear  ENMT: Moist oral mucosa, no pharyngeal injection or exudates; normal dentition  NECK: Supple, no palpable masses; no thyromegaly  RESPIRATORY: Normal respiratory effort; lungs are clear to auscultation bilaterally  CARDIOVASCULAR: Regular rate and rhythm, normal S1 and S2, no murmur/rub/gallop; No lower extremity edema; Peripheral pulses are 2+ bilaterally  ABDOMEN: Nontender to palpation, normoactive bowel sounds, no rebound/guarding; No hepatosplenomegaly  PSYCH: A+O to person, place, and time; affect appropriate  SKIN: No rashes; no palpable lesions    LABS:                        13.8   7.73  )-----------( 142      ( 20 Mar 2024 05:54 )             39.8     03-20    140  |  107  |  15  ----------------------------<  90  4.0   |  23  |  0.70    Ca    9.1      20 Mar 2024 05:54  Phos  4.9     03-20  Mg     2.10     03-20            Urinalysis Basic - ( 20 Mar 2024 05:54 )    Color: x / Appearance: x / SG: x / pH: x  Gluc: 90 mg/dL / Ketone: x  / Bili: x / Urobili: x   Blood: x / Protein: x / Nitrite: x   Leuk Esterase: x / RBC: x / WBC x   Sq Epi: x / Non Sq Epi: x / Bacteria: x          RADIOLOGY & ADDITIONAL TESTS:  Results Reviewed:   Imaging Personally Reviewed:  Electrocardiogram Personally Reviewed:    COORDINATION OF CARE:  Care Discussed with Consultants/Other Providers [Y/N]:  Prior or Outpatient Records Reviewed [Y/N]:   [facility]  Division of Hospital Medicine  Albania Diaz MD  Pager: [phone]      Patient is a 63y old  Female who presents with a chief complaint of     SUBJECTIVE / OVERNIGHT EVENTS: Patient seen and examined at bedside. No chest pain or SOB. Went over medications.   ADDITIONAL REVIEW OF SYSTEMS:    MEDICATIONS  (STANDING):  ARIPiprazole 10 milliGRAM(s) Oral daily  aspirin enteric coated 81 milliGRAM(s) Oral daily  atorvastatin 80 milliGRAM(s) Oral at bedtime  clopidogrel Tablet 75 milliGRAM(s) Oral daily  influenza   Vaccine 0.5 milliLiter(s) IntraMuscular once  isosorbide   mononitrate ER Tablet (IMDUR) 60 milliGRAM(s) Oral daily  lisinopril 20 milliGRAM(s) Oral daily  loratadine 10 milliGRAM(s) Oral daily  nicotine -   7 mG/24Hr(s) Patch 1 Patch Transdermal daily  sodium chloride 0.9% lock flush 3 milliLiter(s) IV Push every 8 hours  sodium chloride 0.9%. 250 milliLiter(s) (100 mL/Hr) IV Continuous <Continuous>    MEDICATIONS  (PRN):  LORazepam     Tablet 0.5 milliGRAM(s) Oral every 6 hours PRN Agitation      CAPILLARY BLOOD GLUCOSE        I&O's Summary      PHYSICAL EXAM:  Vital Signs Last 24 Hrs  T(C): 36.7 (20 Mar 2024 05:20), Max: 36.7 (20 Mar 2024 05:20)  T(F): 98.1 (20 Mar 2024 05:20), Max: 98.1 (20 Mar 2024 05:20)  HR: 60 (20 Mar 2024 05:20) (60 - 73)  BP: 146/45 (20 Mar 2024 05:20) (145/89 - 158/81)  BP(mean): --  RR: 18 (20 Mar 2024 05:20) (18 - 18)  SpO2: 100% (20 Mar 2024 05:20) (100% - 100%)    Parameters below as of 20 Mar 2024 05:20  Patient On (Oxygen Delivery Method): room air      CONSTITUTIONAL: NAD, well-developed, well-groomed  EYES: PERRLA; conjunctiva and sclera clear  ENMT: Moist oral mucosa, no pharyngeal injection or exudates; normal dentition  NECK: Supple, no palpable masses; no thyromegaly  RESPIRATORY: Normal respiratory effort; lungs are clear to auscultation bilaterally  CARDIOVASCULAR: Regular rate and rhythm, normal S1 and S2, no murmur/rub/gallop; No lower extremity edema; Peripheral pulses are 2+ bilaterally  ABDOMEN: Nontender to palpation, normoactive bowel sounds, no rebound/guarding; No hepatosplenomegaly  PSYCH: A+O to person, place, and time; affect appropriate  SKIN: No rashes; no palpable lesions    LABS:                        13.8   7.73  )-----------( 142      ( 20 Mar 2024 05:54 )             39.8     03-20    140  |  107  |  15  ----------------------------<  90  4.0   |  23  |  0.70    Ca    9.1      20 Mar 2024 05:54  Phos  4.9     03-20  Mg     2.10     03-20            Urinalysis Basic - ( 20 Mar 2024 05:54 )    Color: x / Appearance: x / SG: x / pH: x  Gluc: 90 mg/dL / Ketone: x  / Bili: x / Urobili: x   Blood: x / Protein: x / Nitrite: x   Leuk Esterase: x / RBC: x / WBC x   Sq Epi: x / Non Sq Epi: x / Bacteria: x          RADIOLOGY & ADDITIONAL TESTS:  Results Reviewed:   Imaging Personally Reviewed:  Electrocardiogram Personally Reviewed:    COORDINATION OF CARE:  Care Discussed with Consultants/Other Providers [Y/N]:  Prior or Outpatient Records Reviewed [Y/N]:

## 2024-03-20 NOTE — DISCHARGE NOTE PROVIDER - HOSPITAL COURSE
64 y/o female with a PMHx of CAD s/p stent placement, carotid stenosis s/p right carotid endarterectomy, HTN, HLD, PUD with history of duodenal ulcer perforation, breast cancer s/p right lumpectomy, untreated bipolar disorder, and tobacco abuse presents as a transfer from [facility] for chest pain and psychosis/delusions and underwent LHC 3/19: mid LAD 50%. prox LCx 30%. mid RCA 50%, LVEDP 13mmHg, no significant changes from previous LHC in 2020. Started on metoprolol. Psychiatry cleared for discharge. 64 y/o female with a PMHx of CAD s/p stent placement, carotid stenosis s/p right carotid endarterectomy, HTN, HLD, PUD with history of duodenal ulcer perforation, breast cancer s/p right lumpectomy, untreated bipolar disorder, and tobacco abuse presents as a transfer from [facility] for chest pain and psychosis/delusions and underwent LHC 3/19: mid LAD 50%. prox LCx 30%. mid RCA 50%, LVEDP 13mmHg, no significant changes from previous LHC in 2020. Started on metoprolol however patients HR could not tolerate . Psychiatry cleared for discharge.

## 2024-03-20 NOTE — BH CONSULTATION LIAISON PROGRESS NOTE - NSBHCHARTREVIEWLAB_PSY_A_CORE FT
13.8   7.73  )-----------( 142      ( 20 Mar 2024 05:54 )             39.8   03-20    140  |  107  |  15  ----------------------------<  90  4.0   |  23  |  0.70    Ca    9.1      20 Mar 2024 05:54  Phos  4.9     03-20  Mg     2.10     03-20

## 2024-03-20 NOTE — BH CONSULTATION LIAISON PROGRESS NOTE - NSBHASSESSMENTFT_PSY_ALL_CORE
Patient is 64 y/o woman with as per chart,  PMHx of CAD s/p stent placement, carotid stenosis s/p right carotid endarterectomy, HTN, HLD, PUD with history of duodenal ulcer perforation, breast cancer s/p right lumpectomy,  untreated bipolar disorder, and tobacco abuse , Pt is transferred to [facility] from [facility]  for cardiac catheterization.    Patient with mildly elated mood and some irritability, thought process overall linear but can be tangential at times. Patient firmly denies passive or active SIIP, denies HIIP, no avh, denies feeling unsafe in the hospital, reports she has h/o bipolar but she does not agree with it. Will need more assessment/collateral to establish a diagnosis.   In terms of capacity to consent for cardiac catheterization, pt. states that she had cardiac catheterization in the past with stent placed, she is agreeable to go for the procedure, pt. is able to verbalize  understanding of the indication/risk/benefit of accepting or refusing the proposed procedure, able to verbalize understanding/appreciate her current medical issues,  and able to manipulate information in  a logical manner when provided to her. I agree with the primary team, that patient at this time has capacity to consent for cardiac catheterization    3/20: Patient cooperative/polite, less elated, denies avh, denies si and hi. Patient ahs been on good behavioral control compliant with        PLAN:  -Continue psychiatric medication as written  -May use Ativan 0.5-1 mg po/im/iv q6h prn for anxiety/agitation  -Hold antipsychotics if qtc >500 Patient is 64 y/o woman with as per chart,  PMHx of CAD s/p stent placement, carotid stenosis s/p right carotid endarterectomy, HTN, HLD, PUD with history of duodenal ulcer perforation, breast cancer s/p right lumpectomy,  untreated bipolar disorder, and tobacco abuse , Pt is transferred to [facility] from [facility]  for cardiac catheterization.    Patient with mildly elated mood and some irritability, thought process overall linear but can be tangential at times. Patient firmly denies passive or active SIIP, denies HIIP, no avh, denies feeling unsafe in the hospital, reports she has h/o bipolar but she does not agree with it. Will need more assessment/collateral to establish a diagnosis.   In terms of capacity to consent for cardiac catheterization, pt. states that she had cardiac catheterization in the past with stent placed, she is agreeable to go for the procedure, pt. is able to verbalize  understanding of the indication/risk/benefit of accepting or refusing the proposed procedure, able to verbalize understanding/appreciate her current medical issues,  and able to manipulate information in  a logical manner when provided to her. I agree with the primary team, that patient at this time has capacity to consent for cardiac catheterization    3/20: Patient cooperative/polite, less elated, denies avh, denies si and hi. Patient has been in good behavioral control, compliant with medications, amenable to follow up with her outpatient doctors, amenable to see outpatient psychiatrist for medication management. willing to continue Abilify. Overall future oriented/treatment seeking. Collateral has no safety concerns.     PLAN:  -Continue Abilify as written  -May use Ativan 0.5-1 mg po/im/iv q6h prn for anxiety/agitation  -Hold antipsychotics if qtc >500  - Care coordinated with Patient's health care coordinator [place] # [phone]  -Dispo: No role for inpatient psychiatry. Patient is not at acute risk of harm to self or others and does not meet criteria for involuntary psychiatric admission at this time.

## 2024-03-20 NOTE — BH CONSULTATION LIAISON PROGRESS NOTE - OTHER
less elated, seems overall euthymic  good .. Patient did not bring up any topic regarding  her partner left her some money and she think some people are after her due to that.   she denies feeling unsafe in the hospital or even outside the hospital.     not sure if patient  has delusions or what she has told us yesterday is reality based Patient did not bring up any topic regarding  her partner left her some money.   she denies feeling unsafe in the hospital or even outside the hospital.     not sure if patient  has delusions re;  what she has told us yesterday or this is  reality based

## 2024-03-20 NOTE — DISCHARGE NOTE PROVIDER - DISCHARGE SERVICE FOR PATIENT
Satisfactory on the discharge service for the patient. I have reviewed and made amendments to the documentation where necessary.

## 2024-03-20 NOTE — BH CONSULTATION LIAISON PROGRESS NOTE - NSBHCONSULTFOLLOWAFTERCARE_PSY_A_CORE FT
Continue Abilify 10mg   Follow up with outpatient Psych as indicated
[facility] Outpatient services  For acute crisis: [facility]  [phone] [place] [address], [address] Floor, [place] [address]  [phone]  [email or web address]    [facility] [phone]

## 2024-03-20 NOTE — DISCHARGE NOTE PROVIDER - NSDCMRMEDTOKEN_GEN_ALL_CORE_FT
aspirin 81 mg oral delayed release tablet: 1 tab(s) orally once a day  Claritin 10 mg oral tablet: 1 tab(s) orally once a day  isosorbide mononitrate 60 mg oral tablet, extended release: 1 tab(s) orally once a day  lisinopril 20 mg oral tablet: 1 tab(s) orally once a day  medical marijuana:   Plavix 75 mg oral tablet: 1 tab(s) orally once a day  rosuvastatin 20 mg oral tablet: 1 tab(s) orally once a day (at bedtime)   aspirin 81 mg oral delayed release tablet: 1 tab(s) orally once a day  isosorbide mononitrate 60 mg oral tablet, extended release: 1 tab(s) orally once a day  lisinopril 20 mg oral tablet: 1 tab(s) orally once a day  loratadine 10 mg oral tablet: 1 tab(s) orally once a day  melatonin 3 mg oral tablet: 1 tab(s) orally once a day (at bedtime)  nicotine 7 mg/24 hr transdermal film, extended release: 1 transdermal once a day  Plavix 75 mg oral tablet: 1 tab(s) orally once a day  rosuvastatin 20 mg oral tablet: 1 tab(s) orally once a day (at bedtime)   ARIPiprazole 10 mg oral tablet: 1 tab(s) orally once a day  aspirin 81 mg oral delayed release tablet: 1 tab(s) orally once a day  isosorbide mononitrate 60 mg oral tablet, extended release: 1 tab(s) orally once a day  lisinopril 20 mg oral tablet: 1 tab(s) orally once a day  loratadine 10 mg oral tablet: 1 tab(s) orally once a day  melatonin 3 mg oral tablet: 1 tab(s) orally once a day (at bedtime)  nicotine 7 mg/24 hr transdermal film, extended release: 1 transdermal once a day  Plavix 75 mg oral tablet: 1 tab(s) orally once a day  rosuvastatin 20 mg oral tablet: 1 tab(s) orally once a day (at bedtime)   ARIPiprazole 10 mg oral tablet: 1 tab(s) orally once a day  aspirin 81 mg oral delayed release tablet: 1 tab(s) orally once a day  isosorbide mononitrate 60 mg oral tablet, extended release: 1 tab(s) orally once a day  lisinopril 20 mg oral tablet: 1 tab(s) orally once a day  lisinopril 20 mg oral tablet: 1 tab(s) orally once a day  loratadine 10 mg oral tablet: 1 tab(s) orally once a day  melatonin 3 mg oral tablet: 1 tab(s) orally once a day (at bedtime)  Plavix 75 mg oral tablet: 1 tab(s) orally once a day  rosuvastatin 20 mg oral tablet: 1 tab(s) orally once a day (at bedtime)   ARIPiprazole 10 mg oral tablet: 1 tab(s) orally once a day  aspirin 81 mg oral delayed release tablet: 1 tab(s) orally once a day  isosorbide mononitrate 60 mg oral tablet, extended release: 1 tab(s) orally once a day  lisinopril 20 mg oral tablet: 1 tab(s) orally once a day  loratadine 10 mg oral tablet: 1 tab(s) orally once a day  melatonin 3 mg oral tablet: 1 tab(s) orally once a day (at bedtime)  nicotine 7 mg/24 hr transdermal film, extended release: 1 patch transdermal once a day  Plavix 75 mg oral tablet: 1 tab(s) orally once a day  rosuvastatin 20 mg oral tablet: 1 tab(s) orally once a day (at bedtime)

## 2024-03-20 NOTE — DISCHARGE NOTE PROVIDER - TIME SPENT: (MINUTES SPENT ON THE DISCHARGE SERVICE)
"The patient has consented to being recorded using LAURA.    Subjective:     Patient ID: Julia Lujan is a 52 y.o. female.    Chief Complaint:  Follow-up right shoulder pain, follow-up MRI  Follow-up left knee pain, tricompartmental chondromalacia, radial tear posterior root medial meniscus with extrusion  History of Present Illness  Julia Lujan returns to clinic today for evaluation of right shoulder and left knee pain.    The patient had an exacerbation of pain, secondary to having a \"busy week\" at work. She is a . She rates her pain as a 7 or 8 out of 10, aching in nature. She localized her pain to the anterolateral aspect of her shoulder.  Her pain is exacerbated with repetitive pulling motions, overhead lifting, and pulling activities down below her waist height. The patient notes her pain is very positional. She notes that she has been taking meloxicam 15 mg in the morning, which has been helpful. However, she notes her pain is progressing. Denies any new numbness or tingling. She still has mild occasional burning pain in the posterior shoulder, with recent neck surgery 1 year ago.     The patient notes improvement in her left knee pain. She rates her mild residual pain 1 to 2 out of 10, with mild residual swelling noted.      Social History     Occupational History   • Occupation:    Tobacco Use   • Smoking status: Former Smoker     Quit date: 2019     Years since quittin.9   • Smokeless tobacco: Never Used   • Tobacco comment: QUIT 2019 STATED AGE 14   Vaping Use   • Vaping Use: Never used   Substance and Sexual Activity   • Alcohol use: Yes     Comment: WINE 5X A WEEK   • Drug use: Yes     Types: Marijuana     Comment: ROUTINELY   • Sexual activity: Defer      Past Medical History:   Diagnosis Date   • Cervicalgia    • Chest pain, unspecified    • Elevated blood pressure reading without diagnosis of hypertension    • Essential (primary) hypertension    • H/O mammogram "
01/2019   • Headache    • Headache    • Hematuria, unspecified    • Lesion of sciatic nerve, left lower limb    • Major depressive disorder, single episode, unspecified    • Menopausal and female climacteric states    • Nevus, non-neoplastic    • Nontraumatic hematoma of soft tissue    • Other fatigue    • Pain in limb    • Pain in thoracic spine    • Papanicolaou smear 2017   • Pneumonia, unspecified organism    • Radiculopathy, cervical region    • Rib pain    • Unspecified injury of muscle(s) and tendon(s) of the rotator cuff of right shoulder, initial encounter      Past Surgical History:   Procedure Laterality Date   • COLONOSCOPY W/ POLYPECTOMY N/A 3/11/2022    Procedure: COLONOSCOPY WITH POLYPECTOMY;  Surgeon: Clinton Hernandez MD;  Location: [facility];  Service: Gastroenterology;  Laterality: N/A;  sigmoid polyps x 2     • HYSTERECTOMY     • NECK SURGERY         Family History   Problem Relation Age of Onset   • Lymphoma Mother    • Other Father         BLADDER CANCER   • Breast cancer Maternal Grandmother    • Other Maternal Uncle         BRACA GENE+         Review of Systems   Constitutional: Negative for chills, diaphoresis, fever and unexpected weight change.   HENT: Negative for hearing loss, nosebleeds, sneezing and sore throat.    Eyes: Negative for pain and visual disturbance.   Respiratory: Negative for cough, shortness of breath and wheezing.    Cardiovascular: Negative for chest pain and palpitations.   Gastrointestinal: Negative for abdominal pain, diarrhea, nausea and vomiting.   Endocrine: Negative for cold intolerance, heat intolerance and polydipsia.   Genitourinary: Negative for difficulty urinating, dyspareunia and hematuria.   Musculoskeletal: Positive for arthralgias and myalgias. Negative for joint swelling.   Skin: Negative for rash and wound.   Allergic/Immunologic: Negative for environmental allergies.   Neurological: Negative for dizziness, syncope and numbness. 
"  Hematological: Does not bruise/bleed easily.   Psychiatric/Behavioral: Negative for dysphoric mood and sleep disturbance. The patient is not nervous/anxious.            Objective:  Vitals:    03/17/22 1246   Weight: 77.6 kg (171 lb)   Height: 162.6 cm (64\")         03/17/22  1246   Weight: 77.6 kg (171 lb)     Body mass index is 29.35 kg/m².  Physical Exam    Vital signs reviewed.   General: No acute distress, alert and oriented  Eyes: conjunctiva clear; pupils equally round and reactive  ENT: external ears and nose atraumatic; oropharynx clear  CV: no peripheral edema  Resp: normal respiratory effort  Skin: no rashes or wounds; normal turgor  Psych: mood and affect appropriate; recent and remote memory intact          Ortho Exam     Right shoulder-    FF- Active- 175 degrees  Strength- 4/5  ER- Active- 40 degrees   Strength- 4-/5  IR T12  Strength- 5/5  Bear hug sign- Negative  Empty can test- Positive  Neer's sign- Negative  Blakely- Minimally positive  Cross arm test- Negative  Tenderness over AC joint- Minimally positive  Yergason- Positive  Speeds- Positive  Chaffee's- Positive  Brisk cap refill to all digits, palpable 2+ radial pulse right wrist    Imaging:    MRI Shoulder Right Without Contrast    Result Date: 3/7/2022  1. Full-thickness tear of the central and posterior insertional supraspinatus tendon, measuring 1.5 cm in width with 2.5 cm retraction over the middle third of the humeral head. Mild loss of supraspinatus muscle bulk without significant fatty infiltration. 2. Full-thickness tearing of the anterior insertional infraspinatus tendon. Mild loss of infraspinatus muscle bulk with only minimal infraspinatus fatty infiltration. 3. Partial-thickness interstitial tear of the superior insertional subscapularis tendon resulting in medial subluxation of the long head biceps tendon into the substance of the subscapularis tendon tear. This involves less than 50% of the subscapularis tendon thickness. 4. "
Mild long head biceps tendinosis without tear. 5. No acute labral pathology. 6. Mild AC joint arthrosis without significant outlet impingement. 7. Mild inflammation of the subacromial/subdeltoid bursa. Signer Name: Noe Sanchez MD  Signed: 3/7/2022 10:03 AM  Workstation Name: [id number]  [facility]    Preliminary review outside MRI right shoulder include review of images or radiology report indicates full-thickness tear of the supraspinatus with retraction to middle third of the humeral head with minimal evidence of fatty atrophy.  Full-thickness tear of the anterior portion of the infraspinatus tendon as well with long head biceps tendinosis and partial-thickness upper border tear of the subscapularis.    Assessment:        1. Right shoulder pain, unspecified chronicity    2. Complex tear of medial meniscus of left knee as current injury, initial encounter    3. Complete tear of right rotator cuff, unspecified whether traumatic    4. Subacromial impingement of right shoulder    5. Biceps tendinitis of right upper extremity    6. Chondromalacia of knee, left    7. Complex tear of medial meniscus of left knee as current injury, subsequent encounter           Plan:          1. Discussed treatment options at length with patient at today's visit.   2. We reviewed results from MRI of right shoulder. At this point in time, given persistence of pain as well as failure of other conservative treatments and full thickness of the rotator cuff tear, she wished to proceed with surgical treatment at this time.  3. Plan will be for right shoulder arthroscopy, rotator cuff debridement versus repair, possible biceps tenodesis, subacromial decompression, and all associated procedures.  I reviewed risks benefits and alternatives the procedure with risks including not limited to neurovascular damage, bleeding, infection, chronic pain, re-tear rotator cuff, failure of healing rotator cuff, loss of motion, weakness, 
stiffness, instability, biceps sag, DVT, pulmonary embolus, death, stroke, complex regional pain syndrome, myocardial infarction, need for additional procedures. She understood all these had all questions answered.  Patient verbally consented to proceed with surgery.  No guarantees were given regarding results of surgery.  We will have patient medically optimized by primary care physician and proceed with surgery at next available date.  4. Patient denies history of blood clots, diabetes, or heart issues.  5. Follow up: for surgical treatment.      Julia Lujan was in agreement with plan and had all questions answered.     Orders:  No orders of the defined types were placed in this encounter.      Medications:  No orders of the defined types were placed in this encounter.      Followup:  No follow-ups on file.    Diagnoses and all orders for this visit:    1. Right shoulder pain, unspecified chronicity (Primary)    2. Complex tear of medial meniscus of left knee as current injury, initial encounter    3. Complete tear of right rotator cuff, unspecified whether traumatic    4. Subacromial impingement of right shoulder    5. Biceps tendinitis of right upper extremity    6. Chondromalacia of knee, left    7. Complex tear of medial meniscus of left knee as current injury, subsequent encounter          Dictated utilizing Dragon dictation     Transcribed from ambient dictation for Kale Wilkerson MD by Bella Veloz.  03/17/22   14:18 EDT    Patient verbalized consent to the visit recording.  I have personally performed the services described in this document as transcribed by the above individual, and it is both accurate and complete.  Kale Wilkerson MD  3/23/2022  10:22 EDT      
Ambulatory
40

## 2024-03-20 NOTE — PROGRESS NOTE ADULT - PROBLEM SELECTOR PLAN 1
- s/p [facility] - s/p [facility] 3/19: mid LAD 50%. prox LCx 30%. mid RCA 50%, LVEDP 13mmHg, no significant changes from previous [facility] in 2020  - continue ASA 81mg QD and  Plavix 75 mg QD as previously prescribed  - continue home Rosuvastatin 20mg QHS  - continue Lisinopril 20mg QD  - continue Imdur 60mg QD  - Per discussion with Dr Brewer, recommends maximizing antianginals: can start Metoprolol Succinate 25mg QD if HR tolerates

## 2024-03-20 NOTE — PROGRESS NOTE ADULT - ASSESSMENT
64 y/o female with a PMHx of CAD s/p stent placement, carotid stenosis s/p right carotid endarterectomy, HTN, HLD, PUD with history of duodenal ulcer perforation, breast cancer s/p right lumpectomy, untreated bipolar disorder, and tobacco abuse presents as a transfer from [facility] for chest pain and psychosis/delusions and underwent [facility] 3/19: mid LAD 50%. prox LCx 30%. mid RCA 50%, LVEDP 13mmHg, no significant changes from previous LHC in 2020.     Plan  - continue ASA 81mg QD and  Plavix 75 mg QD as previously prescribed  - continue home Rosuvastatin 20mg QHS  - continue Lisinopril 20mg QD  - continue Imdur 60mg QD  - Per discussion with Dr Brewer, recommends maximizing antianginals: can start Metoprolol Succinate 25mg QD if HR tolerates  - Patient management should include aggressive medical therapy and an exercise program  - right radial access site stable w/o hematoma or bleed, pulses intact  - Monitor electrolytes and replete K to 4 and Mg to 2   - Psych consulted for untreated bipolar D/O, recommended to continue psychiatric medication as prescribed.  Hold antipsychotics if qtc >50. Needs further psych safety assessment prior to discharge  - Pt is advised to follow up on discharge with outpatient cardiology in 2 weeks for check-up       62 y/o female with a PMHx of CAD s/p stent placement, carotid stenosis s/p right carotid endarterectomy, HTN, HLD, PUD with history of duodenal ulcer perforation, breast cancer s/p right lumpectomy, untreated bipolar disorder, and tobacco abuse presents as a transfer from [facility] for chest pain and psychosis/delusions and underwent LHC 3/19: mid LAD 50%. prox LCx 30%. mid RCA 50%, LVEDP 13mmHg, no significant changes from previous LHC in 2020.     Plan  - continue ASA 81mg QD and  Plavix 75 mg QD as previously prescribed  - continue home Rosuvastatin 20mg QHS  - continue Lisinopril 20mg QD  - continue Imdur 60mg QD  - Per discussion with Dr Brewer, recommends maximizing antianginals: can start Metoprolol Succinate 25mg QD if HR tolerates  - Patient management should include aggressive medical therapy and an exercise program  - right radial access site stable w/o hematoma or bleed, pulses intact  - Monitor electrolytes and replete K to 4 and Mg to 2   - Psych consulted for untreated bipolar D/O, recommended to continue psychiatric medication as prescribed.  Hold antipsychotics if qtc >50. Needs further psych safety assessment prior to discharge  - Case d/w Dr Brewer, deemed stable from cardiology standpoint, signing off case.  Pt advised to follow up upon discharge with outpatient cardiology in 2 weeks for check-up

## 2024-03-20 NOTE — PROGRESS NOTE ADULT - PROBLEM SELECTOR PLAN 2
- continue aspirin - continue aspirin   continue ASA 81mg QD and  Plavix 75 mg QD as previously prescribed   Pt is advised to follow up on discharge with outpatient cardiology in 2 weeks for check-up

## 2024-03-20 NOTE — DISCHARGE NOTE PROVIDER - ATTENDING DISCHARGE PHYSICAL EXAMINATION:
GENERAL: Middle age woman in NAD  HEENT: EOMI, MMM, no oropharyngeal lesions or erythema appreciated  Pulm: normal work of breathing, CTABL  CV: RRR, S1&S2+, no m/r/g appreciated  ABDOMEN: soft, nt, nd, no hepatosplenomegaly  EXTREMITIES:  no appreciable edema in b/l LE  Neuro: A&Ox3, no focal deficits  SKIN: warm and dry, no visible rash

## 2024-03-20 NOTE — PROGRESS NOTE ADULT - PROBLEM SELECTOR PLAN 5
- psych followin, appreciate recs - psych following, appreciate recs  - Patient cleared for discharge

## 2024-03-20 NOTE — PROGRESS NOTE ADULT - ASSESSMENT
62 y/o female with a PMHx of CAD s/p stent placement, carotid stenosis s/p right carotid endarterectomy, HTN, HLD, PUD with history of duodenal ulcer perforation, breast cancer s/p right lumpectomy, untreated bipolar disorder, and tobacco abuse presents as a transfer from  64 y/o female with a PMHx of CAD s/p stent placement, carotid stenosis s/p right carotid endarterectomy, HTN, HLD, PUD with history of duodenal ulcer perforation, breast cancer s/p right lumpectomy, untreated bipolar disorder, and tobacco abuse presents as a transfer from [facility] for chest pain and psychosis/delusions. underwent LHC 3/19: mid LAD 50%. prox LCx 30%. mid RCA 50%, LVEDP 13mmHg, no significant changes from previous LHC in 2020. Patient started on metoprolol for anti-anginal and psych cleared for discharge

## 2024-03-20 NOTE — BH CONSULTATION LIAISON PROGRESS NOTE - NSBHASSESSMENTFT_PSY_ALL_CORE
Patient is 64 y/o woman with as per chart,  PMHx of CAD s/p stent placement, carotid stenosis s/p right carotid endarterectomy, HTN, HLD, PUD with history of duodenal ulcer perforation, breast cancer s/p right lumpectomy,  untreated bipolar disorder, and tobacco abuse , Pt is transferred to [facility] from [facility]  for cardiac catheterization.    On assessment, pt is comfortable following cath procedure. Reports good sleep and good appetite. Does not have any complains or concerns currently. Is able to provide writer with phone numbers to close contacts for collateral. Agrees to maintain compliance with Abilify 10mg. Denies any mood changes, psychosis, AH/VH, SI/HI.    PLAN:  -Continue psychiatric medication as written  -May use Ativan 0.5-1 mg po/im/iv q6h prn for anxiety/agitation  -Hold antipsychotics if qtc >500  -Dispo: No role for inpatient psych at this time, dc aaceptable upon medical clearance by primary team.

## 2024-03-20 NOTE — PROGRESS NOTE ADULT - NS ATTEND AMEND GEN_ALL_CORE FT
- continue ASA 81mg QD and  Plavix 75 mg QD as previously prescribed  - continue home Rosuvastatin 20mg QHS  - continue Lisinopril 20mg QD  - continue Imdur 60mg QD  - Pt okay for d/c from a cardiology stand point.

## 2024-03-20 NOTE — BH CONSULTATION LIAISON PROGRESS NOTE - NSBHFUPINTERVALHXFT_PSY_A_CORE
Chart reviewed, adherent with PO Abilify, received Ativan 0.5mg X1 yesterday. Patient seen/evaluated, AAOX4, calm/cooperative with overall linear/coherent thought process, states she is doing well s/p procedure, denies avh, denies si and hi, denies feeling unsafe. Patient states she will continue to take her mood stabilizer Abilify as it has been helping her, stating " it keeps my thoughts clear".     Patient reports she has Health Care coordinator Tyree [phone]. Gave consent to speak with Tyree and her boyfriend [employer] [phone]     I spoke with Health Care coordinator Tyree, she reports that's he just visited the patient in the hospital and pt. is looking fine and at baseline, she reports at home patient is functioning well and she denies any acute psychiatric safety concerns. Tyree takes care of her medications (making sure she is taking meds and if need refills) and outpatient follow ups.      Attempted to call patient's boyfriend [employer] [phone] , unable to reach  Chart reviewed, adherent with PO Abilify, received Ativan 0.5mg X1 yesterday. Patient seen/evaluated, AAOX4, calm/cooperative with overall linear/coherent thought process, states she is doing well s/p procedure, denies avh, denies si and hi, denies feeling unsafe. Patient states she will continue to take her mood stabilizer Abilify as it has been helping her, stating " it keeps my thoughts clear".    Patient reports she has Health Care coordinator Tyree [phone]. Gave consent to speak with Tyree and her boyfriend [employer] [phone]     I spoke with Health Care coordinator Tyree, she reports that she just visited the patient in the hospital and pt. is looking fine and at baseline, she reports at home patient is functioning well and she denies any acute psychiatric safety concerns. Tyree takes care of her medications (making sure she is taking meds and if need refills) and outpatient follow ups.  Discussed we will provide outpatient referrals and importance of medication compliance.    Attempted to call patient's boyfriend [employer] [phone] , unable to reach  Chart reviewed, adherent with PO Abilify, received Ativan 0.5mg X1 yesterday. Patient seen/evaluated, AAOX4, calm/cooperative with overall linear/coherent thought process, states she is doing well s/p procedure, denies avh, denies si and hi, denies feeling unsafe. Patient states she will continue to take her mood stabilizer Abilify as it has been helping her, stating " it keeps my thoughts clear". Today pt. reports she has h/o Bipolar disorder but she thinks it's improving/resolving, states she would like to go home to her 2 pet cats.     Patient reports she has Health Care coordinator Tyree [phone]. Gave consent to speak with Tyree and her boyfriend [employer] [phone]     I spoke with Health Care coordinator Tyree, she reports that she just visited the patient in the hospital and pt. is looking fine and at baseline, she reports at home patient is functioning well and she denies any acute psychiatric safety concerns. Tyree takes care of her medications (making sure she is taking meds and if need refills) and outpatient follow ups.  Discussed we will provide outpatient referrals and importance of medication compliance.    Attempted to call patient's boyfriend [employer] [phone] , unable to reach

## 2024-03-20 NOTE — BH CONSULTATION LIAISON PROGRESS NOTE - CURRENT MEDICATION
MEDICATIONS  (STANDING):  ARIPiprazole 10 milliGRAM(s) Oral daily  aspirin enteric coated 81 milliGRAM(s) Oral daily  atorvastatin 80 milliGRAM(s) Oral at bedtime  clopidogrel Tablet 75 milliGRAM(s) Oral daily  influenza   Vaccine 0.5 milliLiter(s) IntraMuscular once  isosorbide   mononitrate ER Tablet (IMDUR) 60 milliGRAM(s) Oral daily  lisinopril 20 milliGRAM(s) Oral daily  loratadine 10 milliGRAM(s) Oral daily  melatonin 3 milliGRAM(s) Oral at bedtime  metoprolol succinate ER 25 milliGRAM(s) Oral daily  nicotine -   7 mG/24Hr(s) Patch 1 Patch Transdermal daily  sodium chloride 0.9% lock flush 3 milliLiter(s) IV Push every 8 hours  sodium chloride 0.9%. 250 milliLiter(s) (100 mL/Hr) IV Continuous <Continuous>    MEDICATIONS  (PRN):  LORazepam     Tablet 0.5 milliGRAM(s) Oral every 6 hours PRN Agitation  
MEDICATIONS  (STANDING):  ARIPiprazole 10 milliGRAM(s) Oral daily  aspirin enteric coated 81 milliGRAM(s) Oral daily  atorvastatin 80 milliGRAM(s) Oral at bedtime  clopidogrel Tablet 75 milliGRAM(s) Oral daily  influenza   Vaccine 0.5 milliLiter(s) IntraMuscular once  isosorbide   mononitrate ER Tablet (IMDUR) 60 milliGRAM(s) Oral daily  lisinopril 20 milliGRAM(s) Oral daily  loratadine 10 milliGRAM(s) Oral daily  melatonin 3 milliGRAM(s) Oral at bedtime  metoprolol succinate ER 25 milliGRAM(s) Oral daily  nicotine -   7 mG/24Hr(s) Patch 1 Patch Transdermal daily  sodium chloride 0.9% lock flush 3 milliLiter(s) IV Push every 8 hours  sodium chloride 0.9%. 250 milliLiter(s) (100 mL/Hr) IV Continuous <Continuous>    MEDICATIONS  (PRN):  LORazepam     Tablet 0.5 milliGRAM(s) Oral every 6 hours PRN Agitation

## 2024-03-20 NOTE — DISCHARGE NOTE PROVIDER - PROVIDER TOKENS
FREE:[LAST:[your primary care provider],PHONE:[(   )    -],FAX:[(   )    -],ADDRESS:[your primary care provider in 1-2 weeks]]

## 2024-03-20 NOTE — PROGRESS NOTE ADULT - SUBJECTIVE AND OBJECTIVE BOX
Patient seen and examined at bedside, found HDS. AOx3    Interval History/ Overnight Events: no acute events overnight.  Pt denies active CP, SOB, palpitations, diaphoresis, orthopnea, PND, dizziness, syncope, hemoptysis, hematuria, melena, abdominal, pain/discomfort, LE swelling or any other complaints at this time    REVIEW OF SYSTEMS:  Constitutional:     [ ] negative [ ] fevers [ ] chills [ ] weight loss [ ] weight gain  HEENT:                  [ ] negative [ ] dry eyes [ ] eye irritation [ ] postnasal drip [ ] nasal congestion  CV:                         [ ] negative  [ ] chest pain [ ] orthopnea [ ] palpitations [ ] murmur  Resp:                     [ ] negative [ ] cough [ ] shortness of breath [ ] dyspnea [ ] wheezing [ ] sputum [ ]hemoptysis  GI:                          [ ] negative [ ] nausea [ ] vomiting [ ] diarrhea [ ] constipation [ ] abd pain [ ] dysphagia   :                        [ ] negative [ ] dysuria [ ] nocturia [ ] hematuria [ ] increased urinary frequency  Musculoskeletal: [ ] negative [ ] back pain [ ] myalgias [ ] arthralgias [ ] fracture  Skin:                       [ ] negative [ ] rash [ ] itch  Neurological:        [ ] negative [ ] headache [ ] dizziness [ ] syncope [ ] weakness [ ] numbness  Psychiatric:           [ ] negative [ ] anxiety [ ] depression  Endocrine:            [ ] negative [ ] diabetes [ ] thyroid problem  Heme/Lymph:      [ ] negative [ ] anemia [ ] bleeding problem  Allergic/Immune: [ ] negative [ ] itchy eyes [ ] nasal discharge [ ] hives [ ] angioedema    [x All other systems negative  [ ] Unable to assess ROS due to    Current Meds:  ARIPiprazole 10 milliGRAM(s) Oral daily  aspirin enteric coated 81 milliGRAM(s) Oral daily  atorvastatin 80 milliGRAM(s) Oral at bedtime  clopidogrel Tablet 75 milliGRAM(s) Oral daily  influenza   Vaccine 0.5 milliLiter(s) IntraMuscular once  isosorbide   mononitrate ER Tablet (IMDUR) 60 milliGRAM(s) Oral daily  lisinopril 20 milliGRAM(s) Oral daily  loratadine 10 milliGRAM(s) Oral daily  LORazepam     Tablet 0.5 milliGRAM(s) Oral every 6 hours PRN  nicotine -   7 mG/24Hr(s) Patch 1 Patch Transdermal daily  sodium chloride 0.9% lock flush 3 milliLiter(s) IV Push every 8 hours  sodium chloride 0.9%. 250 milliLiter(s) IV Continuous <Continuous>    PAST MEDICAL & SURGICAL HISTORY:  Bipolar 1 disorder  resolved per patient    PTSD (post-traumatic stress disorder)    Anxiety      CAD (coronary artery disease)      Psoriasis      HTN (hypertension)      HLD (hyperlipidemia)      PUD (peptic ulcer disease)      Carotid stenosis      Tobacco abuse      History of appendectomy      History of lumpectomy      History of ovarian cyst  benign      History of bilateral breast reduction surgery      S/P coronary artery stent placement      S/P myomectomy    Vitals:  T(F): 98.1 (-20), Max: 98.1 (-20)  HR: 60 (-20) (60 - 73)  BP: 146/45 (-) (145/89 - 158/81)  RR: 18 (-)  SpO2: 100% (-)  I&O's Summary    Physical Exam:  Appearance: No acute distress; well appearing  Neck: Supple, no JVD B/L, no Carotid Bruit B/L  Cardiovascular: RRR, S1, S2, no murmurs, rubs, or gallops, no edema  Respiratory: Clear to auscultation bilaterally, no RRW B/L  Gastrointestinal: soft, non-tender, non-distended with normal bowel sounds      3/19 Cardiac Catheterization (24 @ 16:17)    Study Date:     2024   Name:           ARABELLA BOWDEN   :            1960   (63 years)   Gender:         female   MR#:            [id number]   MPI#:           [id number]   Patient Class:  Inpatient     Cath Lab Report    Diagnostic Cardiologist:       Lashanda Brewer MD   Referring Physician:           Sushila Okeefe MD     Procedures Performed   Procedures:               1.    Arterial Access - Right Radial     2.    Left Heart Cath   3.    Diagnostic Coronary Angiography     Indications:                Positive Stress Test   Suspected coronary artery disease     Lab Visit Indication:      suspected CAD     Diagnostic Conclusions:     Diagnostic cath showed mild to moderate disease. When compated to the  patients previous angiogramin  there is not a  significant difference. The LVEDP during the case was 13 mmHg, which  is normal. There is distal small vessel disease present  as well.    Recommendations:   Patient management should include aggressive medical therapy and an  exercise program  At this time would reccomend maximizing antianginals and adding beta  blocker to the patients regimen.    Presentation:   Pt with known CAD now with + stress test.      Procedure Narrative:   The risks and alternatives of the procedures and conscious sedation  were explained to the patient and informed consent was  obtained. The patient was brought to the cath lab and placed on the  exam table.  Access   Right radial artery:   The puncture site was infiltrated with 2% Lidocaine.Vascular access  was obtained using modified seldinger technique and a 6  Fr. Radial Glidesheath Slender was advanced into the vessel.  Hemostasis/Sheath Status: Hemostasis was successful using  TR Band.      Coronary Angiography   Left Coronary System:   A 5 Fr. DxTerity TRA ULTRA 4.0 was positioned into the vessel ostia  under fluoroscopic guidance. Contrast injections were  performed using hand injection. Right Coronary System:   A 5 Fr. DxTerity TRA ULTRA 4.0 was positioned into the vessel ostia  under fluoroscopic guidance. Contrast injections were  performed using hand injection.    Diagnostic Findings:     Patient: ARABELLA BOWDEN           MRN: [id number]  Study Date: 2024   04:17 PM      Page 1 of 4          Coronary Angiography   The coronary circulation is right dominant.      LM   Left main artery: Angiography shows no disease.      LAD   Left anterior descending artery: The segment is severely calcified.  There is a 50 % stenosis in the middle third portion of the  segment.      CX   Circumflex: Angiography shows minor irregularities. There is a 30 %  stenosis in the proximal third portion of the segment.    RCA   Right coronary artery: Angiography shows moderate atherosclerosis.  There is a 50 % stenosis in the middlethird portion of the  segment. The previously placed stent is a drug-eluting stent and is  patent. Right posterior descending artery: The segment is  small to average size. There is a 60 % stenosis in the ostium portion  of the segment.    < end of copied text >  Neurologic: No focal or neuro deficits noted  Psychiatry: AAOx3, mood & affect appropriate  Access Site: RRA.   Stable, C/D/I, w/o hematoma or bleeding, pulses intact, back to baseline                          13.8   7.73  )-----------( 142      ( 20 Mar 2024 05:54 )             39.8     03-20    140  |  107  |  15  ----------------------------<  90  4.0   |  23  |  0.70    Ca    9.1      20 Mar 2024 05:54  Phos  4.9     03-20  Mg     2.10     03-20          Cardiac Imaging reviewed

## 2024-03-20 NOTE — BH CONSULTATION LIAISON PROGRESS NOTE - NSBHCHARTREVIEWVS_PSY_A_CORE FT
Vital Signs Last 24 Hrs  T(C): 36.7 (20 Mar 2024 05:20), Max: 36.7 (20 Mar 2024 05:20)  T(F): 98.1 (20 Mar 2024 05:20), Max: 98.1 (20 Mar 2024 05:20)  HR: 60 (20 Mar 2024 05:20) (60 - 73)  BP: 146/45 (20 Mar 2024 05:20) (145/89 - 158/81)  BP(mean): --  RR: 18 (20 Mar 2024 05:20) (18 - 18)  SpO2: 100% (20 Mar 2024 05:20) (100% - 100%)    Parameters below as of 20 Mar 2024 05:20  Patient On (Oxygen Delivery Method): room air    
Vital Signs Last 24 Hrs  T(C): 36.7 (20 Mar 2024 05:20), Max: 36.7 (20 Mar 2024 05:20)  T(F): 98.1 (20 Mar 2024 05:20), Max: 98.1 (20 Mar 2024 05:20)  HR: 60 (20 Mar 2024 05:20) (60 - 73)  BP: 146/45 (20 Mar 2024 05:20) (145/89 - 158/81)  BP(mean): --  RR: 18 (20 Mar 2024 05:20) (18 - 18)  SpO2: 100% (20 Mar 2024 05:20) (100% - 100%)    Parameters below as of 20 Mar 2024 05:20  Patient On (Oxygen Delivery Method): room air

## 2024-03-20 NOTE — BH CONSULTATION LIAISON PROGRESS NOTE - NSBHFUPINTERVALHXFT_PSY_A_CORE
pt lying in bed comfortable upon visit. No overnight events. Pt reports feeling good following cath procedure. Reports good sleep overnight and good appetite. A&Ox3. Agrees to being compliant with Abilify 10mg. States she is ready to go home once medically cleared. States her bipolar disorder has been "resolved". Denies mood changes, psychosis, AH/VH, SI/HI.

## 2024-03-21 ENCOUNTER — TRANSCRIPTION ENCOUNTER (OUTPATIENT)
Age: 64
End: 2024-03-21

## 2024-03-21 VITALS
TEMPERATURE: 98 F | HEART RATE: 66 BPM | SYSTOLIC BLOOD PRESSURE: 145 MMHG | OXYGEN SATURATION: 98 % | RESPIRATION RATE: 18 BRPM | DIASTOLIC BLOOD PRESSURE: 88 MMHG

## 2024-03-21 PROCEDURE — 99239 HOSP IP/OBS DSCHRG MGMT >30: CPT

## 2024-03-21 RX ORDER — ISOSORBIDE MONONITRATE 60 MG/1
1 TABLET, EXTENDED RELEASE ORAL
Qty: 30 | Refills: 0
Start: 2024-03-21 | End: 2024-04-19

## 2024-03-21 RX ORDER — METOPROLOL TARTRATE 50 MG
1 TABLET ORAL
Qty: 30 | Refills: 0
Start: 2024-03-21 | End: 2024-04-19

## 2024-03-21 RX ORDER — LISINOPRIL 2.5 MG/1
1 TABLET ORAL
Refills: 0 | DISCHARGE

## 2024-03-21 RX ORDER — LORATADINE 10 MG/1
1 TABLET ORAL
Qty: 0 | Refills: 0 | DISCHARGE
Start: 2024-03-21

## 2024-03-21 RX ORDER — ROSUVASTATIN CALCIUM 5 MG/1
1 TABLET ORAL
Qty: 30 | Refills: 0
Start: 2024-03-21 | End: 2024-04-19

## 2024-03-21 RX ORDER — NICOTINE POLACRILEX 2 MG
1 GUM BUCCAL
Qty: 0 | Refills: 0 | DISCHARGE
Start: 2024-03-21

## 2024-03-21 RX ORDER — LANOLIN ALCOHOL/MO/W.PET/CERES
1 CREAM (GRAM) TOPICAL
Qty: 30 | Refills: 0
Start: 2024-03-21 | End: 2024-04-19

## 2024-03-21 RX ORDER — ASPIRIN/CALCIUM CARB/MAGNESIUM 324 MG
1 TABLET ORAL
Refills: 0 | DISCHARGE

## 2024-03-21 RX ORDER — ARIPIPRAZOLE 15 MG/1
1 TABLET ORAL
Qty: 30 | Refills: 0
Start: 2024-03-21 | End: 2024-04-19

## 2024-03-21 RX ORDER — LISINOPRIL 2.5 MG/1
1 TABLET ORAL
Qty: 0 | Refills: 0 | DISCHARGE
Start: 2024-03-21

## 2024-03-21 RX ORDER — LISINOPRIL 2.5 MG/1
1 TABLET ORAL
Qty: 30 | Refills: 0
Start: 2024-03-21 | End: 2024-04-19

## 2024-03-21 RX ORDER — ISOSORBIDE MONONITRATE 60 MG/1
1 TABLET, EXTENDED RELEASE ORAL
Qty: 0 | Refills: 0 | DISCHARGE
Start: 2024-03-21

## 2024-03-21 RX ORDER — LANOLIN ALCOHOL/MO/W.PET/CERES
1 CREAM (GRAM) TOPICAL
Qty: 0 | Refills: 0 | DISCHARGE
Start: 2024-03-21

## 2024-03-21 RX ORDER — LORATADINE 10 MG/1
1 TABLET ORAL
Refills: 0 | DISCHARGE

## 2024-03-21 RX ORDER — ISOSORBIDE MONONITRATE 60 MG/1
1 TABLET, EXTENDED RELEASE ORAL
Refills: 0 | DISCHARGE

## 2024-03-21 RX ORDER — ASPIRIN/CALCIUM CARB/MAGNESIUM 324 MG
1 TABLET ORAL
Qty: 30 | Refills: 0
Start: 2024-03-21 | End: 2024-04-19

## 2024-03-21 RX ORDER — LORATADINE 10 MG/1
1 TABLET ORAL
Qty: 30 | Refills: 0
Start: 2024-03-21 | End: 2024-04-19

## 2024-03-21 RX ORDER — CLOPIDOGREL BISULFATE 75 MG/1
1 TABLET, FILM COATED ORAL
Qty: 30 | Refills: 0
Start: 2024-03-21 | End: 2024-04-19

## 2024-03-21 RX ORDER — CLOPIDOGREL BISULFATE 75 MG/1
1 TABLET, FILM COATED ORAL
Refills: 0 | DISCHARGE

## 2024-03-21 RX ORDER — NICOTINE POLACRILEX 2 MG
1 GUM BUCCAL
Qty: 1 | Refills: 0
Start: 2024-03-21 | End: 2024-04-03

## 2024-03-21 RX ORDER — ROSUVASTATIN CALCIUM 5 MG/1
1 TABLET ORAL
Refills: 0 | DISCHARGE

## 2024-03-21 RX ADMIN — Medication 81 MILLIGRAM(S): at 11:55

## 2024-03-21 RX ADMIN — ISOSORBIDE MONONITRATE 60 MILLIGRAM(S): 60 TABLET, EXTENDED RELEASE ORAL at 11:56

## 2024-03-21 RX ADMIN — SODIUM CHLORIDE 3 MILLILITER(S): 9 INJECTION INTRAMUSCULAR; INTRAVENOUS; SUBCUTANEOUS at 14:17

## 2024-03-21 RX ADMIN — PANTOPRAZOLE SODIUM 40 MILLIGRAM(S): 20 TABLET, DELAYED RELEASE ORAL at 06:40

## 2024-03-21 RX ADMIN — Medication 1 PATCH: at 12:36

## 2024-03-21 RX ADMIN — LISINOPRIL 20 MILLIGRAM(S): 2.5 TABLET ORAL at 06:40

## 2024-03-21 RX ADMIN — Medication 1 PATCH: at 09:55

## 2024-03-21 RX ADMIN — LORATADINE 10 MILLIGRAM(S): 10 TABLET ORAL at 11:56

## 2024-03-21 RX ADMIN — CLOPIDOGREL BISULFATE 75 MILLIGRAM(S): 75 TABLET, FILM COATED ORAL at 11:56

## 2024-03-21 RX ADMIN — Medication 1 PATCH: at 12:30

## 2024-03-21 RX ADMIN — ARIPIPRAZOLE 10 MILLIGRAM(S): 15 TABLET ORAL at 12:35

## 2024-03-21 NOTE — CHART NOTE - NSCHARTNOTEFT_GEN_A_CORE
Patient seen and examined at bedside. Medically stable for discharge. She has a low HR and does not want to start metoprolol. 35 minutes spent discharge planning.

## 2024-03-21 NOTE — DISCHARGE NOTE NURSING/CASE MANAGEMENT/SOCIAL WORK - PATIENT PORTAL LINK FT
You can access the FollowMyHealth Patient Portal offered by [facility] by registering at the following website: [email or web address]. By joining [employer]’s FollowMyHealth portal, you will also be able to view your health information using other applications (apps) compatible with our system.

## 2024-04-11 ENCOUNTER — APPOINTMENT (OUTPATIENT)
Dept: VASCULAR SURGERY | Facility: CLINIC | Age: 64
End: 2024-04-11
Payer: MEDICAID

## 2024-04-11 VITALS — WEIGHT: 140 LBS | BODY MASS INDEX: 23.9 KG/M2 | HEIGHT: 64 IN

## 2024-04-11 VITALS — HEART RATE: 79 BPM | TEMPERATURE: 98.7 F | DIASTOLIC BLOOD PRESSURE: 77 MMHG | SYSTOLIC BLOOD PRESSURE: 115 MMHG

## 2024-04-11 VITALS — SYSTOLIC BLOOD PRESSURE: 108 MMHG | HEART RATE: 89 BPM | DIASTOLIC BLOOD PRESSURE: 79 MMHG

## 2024-04-11 DIAGNOSIS — Z78.9 OTHER SPECIFIED HEALTH STATUS: ICD-10-CM

## 2024-04-11 DIAGNOSIS — Z86.79 PERSONAL HISTORY OF OTHER DISEASES OF THE CIRCULATORY SYSTEM: ICD-10-CM

## 2024-04-11 DIAGNOSIS — F17.200 NICOTINE DEPENDENCE, UNSPECIFIED, UNCOMPLICATED: ICD-10-CM

## 2024-04-11 DIAGNOSIS — I65.23 OCCLUSION AND STENOSIS OF BILATERAL CAROTID ARTERIES: ICD-10-CM

## 2024-04-11 DIAGNOSIS — Z80.9 FAMILY HISTORY OF MALIGNANT NEOPLASM, UNSPECIFIED: ICD-10-CM

## 2024-04-11 PROCEDURE — 99212 OFFICE O/P EST SF 10 MIN: CPT

## 2024-04-11 PROCEDURE — 93880 EXTRACRANIAL BILAT STUDY: CPT

## 2024-04-15 PROBLEM — I65.23 CAROTID STENOSIS, ASYMPTOMATIC, BILATERAL: Status: ACTIVE | Noted: 2022-12-02

## 2024-04-15 NOTE — PHYSICAL EXAM
[2+] : left 2+ [Alert] : alert [Oriented to Person] : oriented to person [Oriented to Place] : oriented to place [Oriented to Time] : oriented to time [Calm] : calm [de-identified] : well appearing female, NAD [de-identified] : healed R TCAR incision [de-identified] : unlabored

## 2024-04-15 NOTE — ASSESSMENT
[FreeTextEntry1] :   A/P   Carotid artery stenosis s/p R TCAR in 2023. Patient is asymptomatic and doing well. R carotid stent is patent on duplex and she has asymptomatic moderate stenosis of LICA. Continue with medical management and routine surveillance studies.  Continue with antiplatelet and statin.  Follow up in 6 months for repeat carotid duplex.

## 2024-04-15 NOTE — HISTORY OF PRESENT ILLNESS
[FreeTextEntry1] :  ARABELLA BOWDEN (: Dec 31 1960) is a 63 year old female who presents today for carotid artery disease.  She is s/p R TCAR (2023) for asymptomatic high-grade stenosis. Today she reports feeling well overall.  Had recent diagnostic cardiac cath due to abnormal NST. She has been started on Metoprolol.  Denies focal neurologic deficits including amaurosis fugax, slurred speech, and weakness in the arms/legs.  On Plavix and Rosuvastatin.  PMH: CAD s/p multiple coronary artery stents, SOBE, HTN, HLD, ovarian cystectomy, lumpectomy, perforated duodenal ulcer, anxiety, bipolar disorder, current everyday smoker  2024 Carotid duplex demonstrates: CLAU -- patent TCAR without recurrent stenosis LICA -- moderate (198/55) Bilateral vertebral arteries with antegrade flow

## 2024-11-29 NOTE — PROGRESS NOTE BEHAVIORAL HEALTH - NSBHCHARTREVIEWVS_PSY_A_CORE FT
show
Vital Signs Last 24 Hrs  T(C): 36.9 (17 Sep 2021 07:15), Max: 36.9 (17 Sep 2021 07:15)  T(F): 98.5 (17 Sep 2021 07:15), Max: 98.5 (17 Sep 2021 07:15)  HR: --  BP: --  BP(mean): --  RR: 16 (17 Sep 2021 07:15) (16 - 16)  SpO2: 100% (17 Sep 2021 07:15) (100% - 100%)
Vital Signs Last 24 Hrs  T(C): 36.8 (11 Sep 2021 08:02), Max: 36.8 (10 Sep 2021 20:30)  T(F): 98.3 (11 Sep 2021 08:02), Max: 98.3 (11 Sep 2021 08:02)    RR: 14 (11 Sep 2021 08:02) (14 - 18)  SpO2: 100% (11 Sep 2021 08:02) (100% - 100%)
Vital Signs Last 24 Hrs  T(C): 36.4 (12 Sep 2021 09:15), Max: 37.1 (11 Sep 2021 13:04)  T(F): 97.5 (12 Sep 2021 09:15), Max: 98.8 (11 Sep 2021 13:04)    SpO2: 100% (12 Sep 2021 09:15) (100% - 100%)
Vital Signs Last 24 Hrs  T(C): 36.9 (15 Sep 2021 07:11), Max: 36.9 (15 Sep 2021 07:11)  T(F): 98.4 (15 Sep 2021 07:11), Max: 98.4 (15 Sep 2021 07:11)  HR: --  BP: --  BP(mean): --  RR: 14 (15 Sep 2021 07:11) (14 - 14)  SpO2: 98% (15 Sep 2021 07:11) (98% - 98%)
Vital Signs Last 24 Hrs  T(C): 36.7 (10 Sep 2021 12:36), Max: 37.1 (10 Sep 2021 08:58)  T(F): 98.1 (10 Sep 2021 12:36), Max: 98.8 (10 Sep 2021 08:58)  HR: 59 (10 Sep 2021 07:04) (59 - 68)  BP: 130/79 (10 Sep 2021 07:04) (130/79 - 159/88)  BP(mean): 92 (10 Sep 2021 07:04) (92 - 92)  RR: 17 (10 Sep 2021 07:04) (17 - 18)  SpO2: 100% (10 Sep 2021 12:36) (100% - 100%)
Vital Signs Last 24 Hrs  T(C): 36.9 (15 Sep 2021 07:11), Max: 36.9 (15 Sep 2021 07:11)  T(F): 98.4 (15 Sep 2021 07:11), Max: 98.4 (15 Sep 2021 07:11)  HR: --  BP: --  BP(mean): --  RR: 14 (15 Sep 2021 07:11) (14 - 14)  SpO2: 98% (15 Sep 2021 07:11) (98% - 98%)
Vital Signs Last 24 Hrs  T(C): 36.9 (14 Sep 2021 12:47), Max: 36.9 (13 Sep 2021 18:08)  T(F): 98.4 (14 Sep 2021 12:47), Max: 98.5 (13 Sep 2021 18:08)  HR: --  BP: 117/80 (13 Sep 2021 18:08) (117/80 - 117/80)  BP(mean): --  RR: 14 (14 Sep 2021 12:47) (14 - 18)  SpO2: 97% (14 Sep 2021 12:47) (97% - 100%)
Vital Signs Last 24 Hrs  T(C): 37.1 (20 Sep 2021 07:03), Max: 37.1 (20 Sep 2021 07:03)  T(F): 98.7 (20 Sep 2021 07:03), Max: 98.7 (20 Sep 2021 07:03)  HR: --  BP: --  BP(mean): --  RR: 14 (20 Sep 2021 07:03) (14 - 14)  SpO2: 100% (20 Sep 2021 07:03) (100% - 100%)
Vital Signs Last 24 Hrs  T(C): 36.5 (13 Sep 2021 08:31), Max: 36.5 (13 Sep 2021 08:31)  T(F): 97.7 (13 Sep 2021 08:31), Max: 97.7 (13 Sep 2021 08:31)  HR: --  BP: --  BP(mean): --  RR: 12 (13 Sep 2021 08:31) (12 - 12)  SpO2: 99% (13 Sep 2021 08:31) (99% - 99%)

## 2024-12-05 NOTE — H&P ADULT - SOCIAL HISTORY: TOBACCO USE
Informed patient of results. Patient was upset and crying. Patient is going to go back for a redraw and she is also going to send information for FOB to get testing. She said she will message his information.    Few cigs daily.

## 2025-01-27 ENCOUNTER — APPOINTMENT (OUTPATIENT)
Dept: VASCULAR SURGERY | Facility: CLINIC | Age: 65
End: 2025-01-27
Payer: MEDICAID

## 2025-01-27 PROCEDURE — 93880 EXTRACRANIAL BILAT STUDY: CPT

## 2025-01-29 ENCOUNTER — APPOINTMENT (OUTPATIENT)
Dept: VASCULAR SURGERY | Facility: CLINIC | Age: 65
End: 2025-01-29
Payer: MEDICAID

## 2025-01-29 DIAGNOSIS — I65.23 OCCLUSION AND STENOSIS OF BILATERAL CAROTID ARTERIES: ICD-10-CM

## 2025-01-29 PROCEDURE — 99212 OFFICE O/P EST SF 10 MIN: CPT | Mod: 93,25

## 2025-02-05 NOTE — DISCHARGE NOTE NURSING/CASE MANAGEMENT/SOCIAL WORK - FLU SEASON?
Sleep Study Documentation    Pre-Sleep Study       Sleep testing procedure explained to patient:YES    Patient napped prior to study:YES- more than 30 minutes. Napped after 2PM: no    Caffeine:Dayshift worker after 12PM.  Caffeine use:YES- coffee  6 ounces    Alcohol:Dayshift workers after 5PM: Alcohol use:NO    Typical day for patient:YES       Study Documentation    Sleep Study Indications:     Sleep Study: Diagnostic   Snore:Severe  Supplemental O2: no    O2 flow rate (L/min) range   O2 flow rate (L/min) final   Minimum SaO2 87%  Baseline SaO2 94.4%      EKG abnormalities: no     EEG abnormalities: no    Were abnormal behaviors in sleep observed:NO    Is Total Sleep Study Recording Time < 2 hours: N/A    Is Total Sleep Study Recording Time > 2 hours but study is incomplete: N/A    Is Total Sleep Study Recording Time 6 hours or more but sleep was not obtained: NO    Patient classification: retired       Post-Sleep Study    Medication used at bedtime or during sleep study:NO    Patient reports time it took to fall asleep:20 to 30 minutes    Patient reports waking up during study:3 or more times.  Patient reports returning to sleep in 10 to 30 minutes.    Patient reports sleeping 2 to 4 hours without dreaming.    Does the Patient feel this is a typical night of sleep:worse than usual    Patient rated sleepiness: Somewhat sleepy or tired    PAP treatment:no.                   Yes...

## 2025-05-05 NOTE — H&P PST ADULT - PROBLEM/PLAN-2
1300 HIPAA x2  R/t call to pts sister per pt request and made her aware pt does not need to take any dexamethasone for this current tx she will be receiving. Dex is only from her previous tx.  Pt sister voiced understanding and was appreciative of my call.   DISPLAY PLAN FREE TEXT

## 2025-07-16 NOTE — DISCHARGE NOTE PROVIDER - NSDCCPCAREPLAN_GEN_ALL_CORE_FT
PRINCIPAL DISCHARGE DIAGNOSIS  Diagnosis: Chest pain  Assessment and Plan of Treatment: You underwent a cardiac  cath. which did not show any obstructive plaque or disease. you were started on a new medication, metoprolol. Please follow up with cardiology within 2 weeks of leaving hospital     PRINCIPAL DISCHARGE DIAGNOSIS  Diagnosis: Chest pain  Assessment and Plan of Treatment: You underwent a cardiac  cath. which did not show any obstructive plaque or disease.  Your resting Heart rate was low, we were unable to start you on a new medication, metoprolol. Please follow up with cardiology within 2 weeks of leaving hospital     .

## (undated) DEVICE — INFLATOR ENCORE 26

## (undated) DEVICE — COVER PROBE W/GEL 18X120CM STRL 50/BX

## (undated) DEVICE — SPONGE PEANUT AUTO COUNT

## (undated) DEVICE — SUT MONOCRYL 4-0 27" PS-2 UNDYED

## (undated) DEVICE — NDL HYPO SAFE 25G X 5/8" (ORANGE)

## (undated) DEVICE — STAPLER SKIN MULTI DIRECTION W35

## (undated) DEVICE — DRAPE TOWEL BLUE 17" X 24"

## (undated) DEVICE — DRAPE 3/4 SHEET 52X76"

## (undated) DEVICE — URETERAL CATH RED RUBBER 16FR (ORANGE)

## (undated) DEVICE — Device

## (undated) DEVICE — SOL IRR POUR NS 0.9% 500ML

## (undated) DEVICE — PROTECTOR HEEL / ELBOW FLUFFY

## (undated) DEVICE — ELCTR GROUNDING PAD ADULT COVIDIEN

## (undated) DEVICE — GLV 7.5 PROTEXIS (CREAM) MICRO

## (undated) DEVICE — DRAPE MINOR PROCEDURE

## (undated) DEVICE — PACK PERIPHERAL VASC

## (undated) DEVICE — POSITIONER STRAP ARMBOARD VELCRO TS-30

## (undated) DEVICE — TAPE SILK 3"

## (undated) DEVICE — DRSG CURITY GAUZE SPONGE 4 X 4" 12-PLY

## (undated) DEVICE — SUT VICRYL 3-0 18" SH UNDYED (POP-OFF)

## (undated) DEVICE — WARMING BLANKET FULL ADULT

## (undated) DEVICE — DRSG TAPE UMBILICAL COTTON 2" X 30 X 1/8"

## (undated) DEVICE — FOLEY TRAY 16FR 5CC LF UMETER CLOSED

## (undated) DEVICE — SYR LUER LOK 3CC

## (undated) DEVICE — SUCTION YANKAUER NO CONTROL VENT

## (undated) DEVICE — SUT PROLENE 6-0 18" C-1

## (undated) DEVICE — VENODYNE/SCD SLEEVE CALF MEDIUM

## (undated) DEVICE — SUT PROLENE 6-0 30" BV-1

## (undated) DEVICE — POSITIONER FOAM EGG CRATE ULNAR 2PCS (PINK)